# Patient Record
Sex: MALE | Race: WHITE | Employment: OTHER | URBAN - METROPOLITAN AREA
[De-identification: names, ages, dates, MRNs, and addresses within clinical notes are randomized per-mention and may not be internally consistent; named-entity substitution may affect disease eponyms.]

---

## 2017-12-04 ENCOUNTER — HOSPITAL ENCOUNTER (OUTPATIENT)
Dept: OTHER | Age: 82
Discharge: HOME | End: 2017-12-04
Attending: INTERNAL MEDICINE | Admitting: INTERNAL MEDICINE

## 2017-12-04 LAB
A/G RATIO: 1.3 (ref 1.1–2.2)
ALBUMIN SERPL-MCNC: 3.8 G/DL (ref 3.4–5)
ALP BLD-CCNC: 60 U/L (ref 40–129)
ALT SERPL-CCNC: 13 U/L (ref 10–40)
ANION GAP SERPL CALCULATED.3IONS-SCNC: 13 MMOL/L (ref 3–16)
AST SERPL-CCNC: 17 U/L (ref 15–37)
BASOPHILS ABSOLUTE: 0.1 K/UL (ref 0–0.2)
BASOPHILS RELATIVE PERCENT: 1 %
BILIRUB SERPL-MCNC: <0.2 MG/DL (ref 0–1)
BUN BLDV-MCNC: 15 MG/DL (ref 7–20)
CALCIUM SERPL-MCNC: 8.7 MG/DL (ref 8.3–10.6)
CHLORIDE BLD-SCNC: 105 MMOL/L (ref 99–110)
CHOLESTEROL, TOTAL: 149 MG/DL (ref 0–199)
CO2: 25 MMOL/L (ref 21–32)
CREAT SERPL-MCNC: 0.9 MG/DL (ref 0.8–1.3)
EOSINOPHILS ABSOLUTE: 0.5 K/UL (ref 0–0.6)
EOSINOPHILS RELATIVE PERCENT: 7.3 %
ESTIMATED AVERAGE GLUCOSE: 139.9 MG/DL
GFR AFRICAN AMERICAN: >60
GFR NON-AFRICAN AMERICAN: >60
GLOBULIN: 2.9 G/DL
GLUCOSE BLD-MCNC: 132 MG/DL (ref 70–99)
HBA1C MFR BLD: 6.5 %
HCT VFR BLD CALC: 36.8 % (ref 40.5–52.5)
HDLC SERPL-MCNC: 79 MG/DL (ref 40–60)
HEMOGLOBIN: 12.4 G/DL (ref 13.5–17.5)
LDL CHOLESTEROL CALCULATED: 58 MG/DL
LYMPHOCYTES ABSOLUTE: 1 K/UL (ref 1–5.1)
LYMPHOCYTES RELATIVE PERCENT: 16.6 %
MCH RBC QN AUTO: 32.4 PG (ref 26–34)
MCHC RBC AUTO-ENTMCNC: 33.6 G/DL (ref 31–36)
MCV RBC AUTO: 96.4 FL (ref 80–100)
MONOCYTES ABSOLUTE: 0.8 K/UL (ref 0–1.3)
MONOCYTES RELATIVE PERCENT: 13.7 %
NEUTROPHILS ABSOLUTE: 3.8 K/UL (ref 1.7–7.7)
NEUTROPHILS RELATIVE PERCENT: 61.4 %
PDW BLD-RTO: 13.3 % (ref 12.4–15.4)
PLATELET # BLD: 196 K/UL (ref 135–450)
PMV BLD AUTO: 8.6 FL (ref 5–10.5)
POTASSIUM SERPL-SCNC: 4.4 MMOL/L (ref 3.5–5.1)
RBC # BLD: 3.82 M/UL (ref 4.2–5.9)
SODIUM BLD-SCNC: 143 MMOL/L (ref 136–145)
TOTAL PROTEIN: 6.7 G/DL (ref 6.4–8.2)
TRIGL SERPL-MCNC: 58 MG/DL (ref 0–150)
TSH SERPL DL<=0.05 MIU/L-ACNC: 2.31 UIU/ML (ref 0.27–4.2)
VLDLC SERPL CALC-MCNC: 12 MG/DL
WBC # BLD: 6.2 K/UL (ref 4–11)

## 2018-12-03 ENCOUNTER — HOSPITAL ENCOUNTER (OUTPATIENT)
Age: 83
Discharge: HOME | End: 2018-12-03
Payer: MEDICARE

## 2018-12-03 LAB
A/G RATIO: 1.4 (ref 1.1–2.2)
ALBUMIN SERPL-MCNC: 4 G/DL (ref 3.4–5)
ALP BLD-CCNC: 71 U/L (ref 40–129)
ALT SERPL-CCNC: 14 U/L (ref 10–40)
ANION GAP SERPL CALCULATED.3IONS-SCNC: 11 MMOL/L (ref 3–16)
AST SERPL-CCNC: 17 U/L (ref 15–37)
BILIRUB SERPL-MCNC: <0.2 MG/DL (ref 0–1)
BUN BLDV-MCNC: 12 MG/DL (ref 7–20)
CALCIUM SERPL-MCNC: 9.1 MG/DL (ref 8.3–10.6)
CHLORIDE BLD-SCNC: 103 MMOL/L (ref 99–110)
CHOLESTEROL, TOTAL: 149 MG/DL (ref 0–199)
CO2: 26 MMOL/L (ref 21–32)
CREAT SERPL-MCNC: 1 MG/DL (ref 0.8–1.3)
GFR AFRICAN AMERICAN: >60
GFR NON-AFRICAN AMERICAN: >60
GLOBULIN: 2.9 G/DL
GLUCOSE BLD-MCNC: 117 MG/DL (ref 70–99)
HDLC SERPL-MCNC: 66 MG/DL (ref 40–60)
LDL CHOLESTEROL CALCULATED: 70 MG/DL
POTASSIUM SERPL-SCNC: 4.4 MMOL/L (ref 3.5–5.1)
SODIUM BLD-SCNC: 140 MMOL/L (ref 136–145)
TOTAL PROTEIN: 6.9 G/DL (ref 6.4–8.2)
TRIGL SERPL-MCNC: 64 MG/DL (ref 0–150)
TSH SERPL DL<=0.05 MIU/L-ACNC: 2.1 UIU/ML (ref 0.27–4.2)
VLDLC SERPL CALC-MCNC: 13 MG/DL

## 2018-12-03 PROCEDURE — 36415 COLL VENOUS BLD VENIPUNCTURE: CPT

## 2018-12-03 PROCEDURE — 80061 LIPID PANEL: CPT

## 2018-12-03 PROCEDURE — 84443 ASSAY THYROID STIM HORMONE: CPT

## 2018-12-03 PROCEDURE — 83036 HEMOGLOBIN GLYCOSYLATED A1C: CPT

## 2018-12-03 PROCEDURE — 80053 COMPREHEN METABOLIC PANEL: CPT

## 2018-12-04 LAB
ESTIMATED AVERAGE GLUCOSE: 148.5 MG/DL
HBA1C MFR BLD: 6.8 %

## 2019-04-16 ENCOUNTER — HOSPITAL ENCOUNTER (OUTPATIENT)
Age: 84
Discharge: HOME | End: 2019-04-16
Payer: MEDICARE

## 2019-04-16 LAB
A/G RATIO: 1.2 (ref 1.1–2.2)
ALBUMIN SERPL-MCNC: 3.8 G/DL (ref 3.4–5)
ALP BLD-CCNC: 75 U/L (ref 40–129)
ALT SERPL-CCNC: 13 U/L (ref 10–40)
ANION GAP SERPL CALCULATED.3IONS-SCNC: 10 MMOL/L (ref 3–16)
AST SERPL-CCNC: 20 U/L (ref 15–37)
BILIRUB SERPL-MCNC: <0.2 MG/DL (ref 0–1)
BUN BLDV-MCNC: 14 MG/DL (ref 7–20)
CALCIUM SERPL-MCNC: 9.1 MG/DL (ref 8.3–10.6)
CHLORIDE BLD-SCNC: 102 MMOL/L (ref 99–110)
CHOLESTEROL, TOTAL: 141 MG/DL (ref 0–199)
CO2: 27 MMOL/L (ref 21–32)
CREAT SERPL-MCNC: 1.2 MG/DL (ref 0.8–1.3)
GFR AFRICAN AMERICAN: >60
GFR NON-AFRICAN AMERICAN: 58
GLOBULIN: 3.2 G/DL
GLUCOSE BLD-MCNC: 125 MG/DL (ref 70–99)
HDLC SERPL-MCNC: 65 MG/DL (ref 40–60)
LDL CHOLESTEROL CALCULATED: 61 MG/DL
POTASSIUM SERPL-SCNC: 4.8 MMOL/L (ref 3.5–5.1)
SODIUM BLD-SCNC: 139 MMOL/L (ref 136–145)
TOTAL PROTEIN: 7 G/DL (ref 6.4–8.2)
TRIGL SERPL-MCNC: 76 MG/DL (ref 0–150)
TSH SERPL DL<=0.05 MIU/L-ACNC: 2.16 UIU/ML (ref 0.27–4.2)
VLDLC SERPL CALC-MCNC: 15 MG/DL

## 2019-04-16 PROCEDURE — 80061 LIPID PANEL: CPT

## 2019-04-16 PROCEDURE — 83036 HEMOGLOBIN GLYCOSYLATED A1C: CPT

## 2019-04-16 PROCEDURE — 36415 COLL VENOUS BLD VENIPUNCTURE: CPT

## 2019-04-16 PROCEDURE — 80053 COMPREHEN METABOLIC PANEL: CPT

## 2019-04-16 PROCEDURE — 84443 ASSAY THYROID STIM HORMONE: CPT

## 2019-04-17 LAB
ESTIMATED AVERAGE GLUCOSE: 142.7 MG/DL
HBA1C MFR BLD: 6.6 %

## 2019-06-20 ENCOUNTER — HOSPITAL ENCOUNTER (OUTPATIENT)
Age: 84
Discharge: HOME | End: 2019-06-20
Payer: MEDICARE

## 2019-06-20 ENCOUNTER — HOSPITAL ENCOUNTER (OUTPATIENT)
Dept: GENERAL RADIOLOGY | Age: 84
Discharge: HOME | End: 2019-06-20
Payer: MEDICARE

## 2019-06-20 DIAGNOSIS — M79.645 PAIN OF LEFT MIDDLE FINGER: ICD-10-CM

## 2019-06-20 PROCEDURE — 73140 X-RAY EXAM OF FINGER(S): CPT

## 2019-10-08 ENCOUNTER — HOSPITAL ENCOUNTER (OUTPATIENT)
Age: 84
Discharge: HOME | End: 2019-10-08
Payer: MEDICARE

## 2019-10-08 LAB
A/G RATIO: 1.7 (ref 1.1–2.2)
ALBUMIN SERPL-MCNC: 4.5 G/DL (ref 3.4–5)
ALP BLD-CCNC: 66 U/L (ref 40–129)
ALT SERPL-CCNC: 12 U/L (ref 10–40)
ANION GAP SERPL CALCULATED.3IONS-SCNC: 13 MMOL/L (ref 3–16)
AST SERPL-CCNC: 18 U/L (ref 15–37)
BASOPHILS ABSOLUTE: 0.1 K/UL (ref 0–0.2)
BASOPHILS RELATIVE PERCENT: 0.9 %
BILIRUB SERPL-MCNC: 0.3 MG/DL (ref 0–1)
BUN BLDV-MCNC: 16 MG/DL (ref 7–20)
CALCIUM SERPL-MCNC: 9.4 MG/DL (ref 8.3–10.6)
CHLORIDE BLD-SCNC: 103 MMOL/L (ref 99–110)
CHOLESTEROL, TOTAL: 141 MG/DL (ref 0–199)
CO2: 26 MMOL/L (ref 21–32)
CREAT SERPL-MCNC: 1.1 MG/DL (ref 0.8–1.3)
EOSINOPHILS ABSOLUTE: 0.3 K/UL (ref 0–0.6)
EOSINOPHILS RELATIVE PERCENT: 4.7 %
FOLATE: >20 NG/ML (ref 4.78–24.2)
GFR AFRICAN AMERICAN: >60
GFR NON-AFRICAN AMERICAN: >60
GLOBULIN: 2.6 G/DL
GLUCOSE BLD-MCNC: 106 MG/DL (ref 70–99)
HCT VFR BLD CALC: 37.4 % (ref 40.5–52.5)
HDLC SERPL-MCNC: 64 MG/DL (ref 40–60)
HEMOGLOBIN: 12.4 G/DL (ref 13.5–17.5)
LDL CHOLESTEROL CALCULATED: 58 MG/DL
LYMPHOCYTES ABSOLUTE: 1.4 K/UL (ref 1–5.1)
LYMPHOCYTES RELATIVE PERCENT: 21.1 %
MCH RBC QN AUTO: 32.3 PG (ref 26–34)
MCHC RBC AUTO-ENTMCNC: 33.1 G/DL (ref 31–36)
MCV RBC AUTO: 97.6 FL (ref 80–100)
MONOCYTES ABSOLUTE: 0.7 K/UL (ref 0–1.3)
MONOCYTES RELATIVE PERCENT: 10.8 %
NEUTROPHILS ABSOLUTE: 4.1 K/UL (ref 1.7–7.7)
NEUTROPHILS RELATIVE PERCENT: 62.5 %
PDW BLD-RTO: 13.4 % (ref 12.4–15.4)
PLATELET # BLD: 203 K/UL (ref 135–450)
PMV BLD AUTO: 9 FL (ref 5–10.5)
POTASSIUM SERPL-SCNC: 4.7 MMOL/L (ref 3.5–5.1)
RBC # BLD: 3.83 M/UL (ref 4.2–5.9)
SODIUM BLD-SCNC: 142 MMOL/L (ref 136–145)
TOTAL PROTEIN: 7.1 G/DL (ref 6.4–8.2)
TRIGL SERPL-MCNC: 96 MG/DL (ref 0–150)
TSH SERPL DL<=0.05 MIU/L-ACNC: 2.1 UIU/ML (ref 0.27–4.2)
VITAMIN B-12: 624 PG/ML (ref 211–911)
VITAMIN D 25-HYDROXY: 43.4 NG/ML
VLDLC SERPL CALC-MCNC: 19 MG/DL
WBC # BLD: 6.5 K/UL (ref 4–11)

## 2019-10-08 PROCEDURE — 83036 HEMOGLOBIN GLYCOSYLATED A1C: CPT

## 2019-10-08 PROCEDURE — 80061 LIPID PANEL: CPT

## 2019-10-08 PROCEDURE — 80053 COMPREHEN METABOLIC PANEL: CPT

## 2019-10-08 PROCEDURE — 82607 VITAMIN B-12: CPT

## 2019-10-08 PROCEDURE — 85025 COMPLETE CBC W/AUTO DIFF WBC: CPT

## 2019-10-08 PROCEDURE — 82306 VITAMIN D 25 HYDROXY: CPT

## 2019-10-08 PROCEDURE — 84443 ASSAY THYROID STIM HORMONE: CPT

## 2019-10-08 PROCEDURE — 82746 ASSAY OF FOLIC ACID SERUM: CPT

## 2019-10-08 PROCEDURE — 36415 COLL VENOUS BLD VENIPUNCTURE: CPT

## 2019-10-09 LAB
ESTIMATED AVERAGE GLUCOSE: 142.7 MG/DL
HBA1C MFR BLD: 6.6 %

## 2020-07-14 ENCOUNTER — HOSPITAL ENCOUNTER (OUTPATIENT)
Dept: GENERAL RADIOLOGY | Age: 85
Discharge: HOME | End: 2020-07-14
Payer: MEDICARE

## 2020-07-14 ENCOUNTER — HOSPITAL ENCOUNTER (OUTPATIENT)
Age: 85
Discharge: HOME | End: 2020-07-14
Payer: MEDICARE

## 2020-07-14 PROCEDURE — 72100 X-RAY EXAM L-S SPINE 2/3 VWS: CPT

## 2020-07-27 ENCOUNTER — HOSPITAL ENCOUNTER (OUTPATIENT)
Dept: VASCULAR LAB | Age: 85
Discharge: HOME | End: 2020-07-27
Payer: MEDICARE

## 2020-07-27 PROCEDURE — 93922 UPR/L XTREMITY ART 2 LEVELS: CPT

## 2020-07-31 ENCOUNTER — HOSPITAL ENCOUNTER (OUTPATIENT)
Age: 85
Discharge: HOME | End: 2020-07-31
Payer: MEDICARE

## 2020-07-31 LAB — C DIFF TOXIN/ANTIGEN: NORMAL

## 2020-07-31 PROCEDURE — 87336 ENTAMOEB HIST DISPR AG IA: CPT

## 2020-07-31 PROCEDURE — 87324 CLOSTRIDIUM AG IA: CPT

## 2020-07-31 PROCEDURE — 87328 CRYPTOSPORIDIUM AG IA: CPT

## 2020-07-31 PROCEDURE — 87449 NOS EACH ORGANISM AG IA: CPT

## 2020-07-31 PROCEDURE — 83630 LACTOFERRIN FECAL (QUAL): CPT

## 2020-07-31 PROCEDURE — 87505 NFCT AGENT DETECTION GI: CPT

## 2020-08-01 LAB
CRYPTOSPORIDIUM ANTIGEN STOOL: NORMAL
E HISTOLYTICA ANTIGEN STOOL: NORMAL
GIARDIA ANTIGEN STOOL: NORMAL
WHITE BLOOD CELLS (WBC), STOOL: ABNORMAL

## 2020-08-02 LAB — GI BACTERIAL PATHOGENS BY PCR: NORMAL

## 2020-08-05 ENCOUNTER — OFFICE VISIT (OUTPATIENT)
Dept: VASCULAR SURGERY | Age: 85
End: 2020-08-05
Payer: MEDICARE

## 2020-08-05 VITALS
DIASTOLIC BLOOD PRESSURE: 62 MMHG | WEIGHT: 135.8 LBS | SYSTOLIC BLOOD PRESSURE: 150 MMHG | TEMPERATURE: 98.6 F | BODY MASS INDEX: 22.63 KG/M2 | HEIGHT: 65 IN

## 2020-08-05 PROCEDURE — 99205 OFFICE O/P NEW HI 60 MIN: CPT | Performed by: SURGERY

## 2020-08-05 RX ORDER — LEVOTHYROXINE SODIUM 0.05 MG/1
TABLET ORAL
COMMUNITY
Start: 2020-05-04 | End: 2021-05-19

## 2020-08-05 RX ORDER — LATANOPROST 50 UG/ML
1 SOLUTION/ DROPS OPHTHALMIC DAILY
COMMUNITY
Start: 2020-05-21

## 2020-08-05 RX ORDER — BUDESONIDE AND FORMOTEROL FUMARATE DIHYDRATE 160; 4.5 UG/1; UG/1
AEROSOL RESPIRATORY (INHALATION)
COMMUNITY
Start: 2020-06-17 | End: 2021-05-19

## 2020-08-05 RX ORDER — MULTIVITAMIN
1 TABLET ORAL DAILY
COMMUNITY

## 2020-08-05 NOTE — PROGRESS NOTES
Subjective:      Patient ID: Dino Diaz is a 82 y.o. male. HPI Referral from Curt Crawford MD for evaluation of c/o L calf pain with walking occurring at 1-1 1/2 blocks without extension into the thigh or buttock. Relieved with rest and recurs with resumption of walking. Began approximately 6 months ago and seems to have worsened with shorter distances. No rest pain, foot ulcers or gangrene. Has not been involved with a walking program or added any medication. No previous vascular interventions. History reviewed. No pertinent past medical history. Past Surgical History:   Procedure Laterality Date    CARPAL TUNNEL RELEASE Bilateral     CYSTOSCOPY      LUMBAR SPINE SURGERY       Allergies   Allergen Reactions    Naproxen Rash     Current Outpatient Medications   Medication Sig Dispense Refill    SYMBICORT 160-4.5 MCG/ACT AERO       levothyroxine (SYNTHROID) 50 MCG tablet       latanoprost (XALATAN) 0.005 % ophthalmic solution       Multiple Vitamin (MULTIVITAMIN) tablet Take 1 tablet by mouth daily      triamcinolone (KENALOG) 0.1 % ointment        No current facility-administered medications for this visit. Social History     Socioeconomic History    Marital status:       Spouse name: Not on file    Number of children: Not on file    Years of education: Not on file    Highest education level: Not on file   Occupational History    Not on file   Social Needs    Financial resource strain: Not on file    Food insecurity     Worry: Not on file     Inability: Not on file    Transportation needs     Medical: Not on file     Non-medical: Not on file   Tobacco Use    Smoking status: Former Smoker    Smokeless tobacco: Former User   Substance and Sexual Activity    Alcohol use: Not on file    Drug use: Not on file    Sexual activity: Not on file   Lifestyle    Physical activity     Days per week: Not on file     Minutes per session: Not on file    Stress: Not on file Relationships    Social connections     Talks on phone: Not on file     Gets together: Not on file     Attends Restorationism service: Not on file     Active member of club or organization: Not on file     Attends meetings of clubs or organizations: Not on file     Relationship status: Not on file    Intimate partner violence     Fear of current or ex partner: Not on file     Emotionally abused: Not on file     Physically abused: Not on file     Forced sexual activity: Not on file   Other Topics Concern    Not on file   Social History Narrative    Not on file     Family History   Family history unknown: Yes         Review of Systems   All other systems reviewed and are negative. 15 pt ROS confirmed personally by this MD.    Objective:   Physical Exam  Vitals signs and nursing note reviewed. Constitutional:       Appearance: Normal appearance. Comments: Looks younger than stated age   HENT:      Head: Normocephalic and atraumatic. Right Ear: External ear normal.      Left Ear: External ear normal.      Nose: Nose normal.      Mouth/Throat:      Mouth: Mucous membranes are moist.      Pharynx: Oropharynx is clear. Eyes:      Extraocular Movements: Extraocular movements intact. Conjunctiva/sclera: Conjunctivae normal.   Neck:      Musculoskeletal: Neck supple. Cardiovascular:      Rate and Rhythm: Normal rate and regular rhythm. Heart sounds: Normal heart sounds. Pulmonary:      Effort: Pulmonary effort is normal.      Breath sounds: Normal breath sounds. Abdominal:      General: Abdomen is flat. Bowel sounds are normal.      Palpations: Abdomen is soft. There is mass (4-5 cm pulsatile mass). Musculoskeletal:      Right lower leg: No edema. Left lower leg: No edema. Skin:     General: Skin is warm and dry. Capillary Refill: Capillary refill takes less than 2 seconds. Neurological:      General: No focal deficit present.       Mental Status: He is alert and oriented to person, place, and time. Cranial Nerves: No cranial nerve deficit. Sensory: No sensory deficit. Motor: No weakness. Coordination: Coordination normal.      Gait: Gait normal.   Psychiatric:         Mood and Affect: Mood normal.         Behavior: Behavior normal.         Thought Content: Thought content normal.         Judgment: Judgment normal.       Pulses:   R bruit  L bruit   2   carotid 2    2   brachial 2    2   radial 2    2   femoral 2 +   2   popliteal 0    0   posterior tibial 0    0   dorsalis pedis 0    na   bypass graft na      ABIs with PPGs 7/27/20202  Impressions    Right Impression    Right DENISE: 0.86. This is consistent with mild arterial insufficiency at rest.    Right first toe/brachial index 0.42: This is moderately abnormal .    Continuous wave Doppler of the right PTA, DPA is abnormal biphasic low    resistive . Right pulse volume recordings demonstrates mild tibial runoff disease . Digit photoplethysmography (PPG) on the right 1st through 5th digits    demonstrates mildly diminished waveforms . Left Impression    Left DENISE: 0.45. This is consistent with severe arterial insufficiency at rest.    Left first toe/brachial index 0.23: This is severely abnormal and not    favorable for wound healing . Continuous wave Doppler of the left PTA, DPA is abnormal monophasic . Left pulse volume recordings demonstrates moderate tibial runoff disease . Digit photoplethysmography (PPG) on the left 1st through 5th digits    demonstrates severely diminished waveforms .         Conclusions          Summary          -Right DENISE: 0.86. This is consistent with mild arterial insufficiency at     rest.     -Right first toe/brachial index 0.42: This is moderately abnormal.          -Left DENISE: 0.45. This is consistent with severe arterial insufficiency at     rest.     -Left first toe/brachial index 0.23:  This is severely abnormal and not     favorable for wound healing .       Recommendations          -Recommend referral to a vascular specialist.          Signature          ------------------------------------------------------------------     Electronically signed by Olivier Ramon MD, [facility] (Interpreting     [id number]) on 07/28/2020 at 01:39 PM     ------------------------------------------------------------------         Patient Status:Routine. [address] [place] [address] - Vascular Lab.         Velocities are measured in cm/s ; Diameters are measured in mm         Pressures    +---------++--------+-----+----+--------+-----+    !         ! ! Right   !     !Left!        !     !    +---------++--------+-----+----+--------+-----+    ! Location ! !Pressure! Ratio!    !Pressure! Ratio! +---------++--------+-----+----+--------+-----+    ! DP       !!80      !0.7  !    !49      !0.42 ! +---------++--------+-----+----+--------+-----+    ! Ankle PT !!102     !0.86 !    !53      !0.45 !    +---------++--------+-----+----+--------+-----+    ! Great Toe!!50      !0.42 !    !27      !0.23 !    +---------++--------+-----+----+--------+-----+           - Brachial Pressure:Right: 118. Left:114.           - DENISE:Right: 0.86. Left: 0.45.         Right Plethysmographic Results           - Right Brachial Pressure:118.           - Right DENISE:0.86.         Left Plethysmographic Results           - Left Brachial Pressure:114.           - Left DENISE:0.45.         Plethysmographic Digit Evaluation    +---------++--------+-----+-------------++--------+-----+-------------+    !         ! ! Right   !     ! Left         !!        !     !             !    +---------++--------+-----+-------------++--------+-----+-------------+    ! Location ! !Pressure! Ratio! PPG Wave Form! !Pressure! Ratio! PPG Wave Form!    +---------++--------+-----+-------------++--------+-----+-------------+    ! Great Toe!!50      !0.42 !             !!32      !0.23 !             ! 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 No limb threatening ischemia  2) Asymptomatic R tibial occlusive disease  3) H/O tobacco abuse  4) COPD  5) Prominent aortic pulsation - possible AAA (suggested on plain back Xrays)      Plan:      Outlined the various presentations of LE PAD and the risk of limb loss associated with both. His current presentation with claudication is of low risk for progression to limb loss or progression to the need for intervention for limb salvage. At this point I have recommended that he proceed with a noninterventional approach for since he is doing well without limitations of his normal activities. This would involve initiation of a walking program walking 5 to 6 days a week for up to an hour pushing his distance of claudication for improvement. The option of adding Pletal exists as well should he show some benefit from walking however this would not be prescribed as a stand-alone therapy. He is agreeable to this conservative approach and understands that walking will not damage his leg.   He is to return to see us in 6 weeks for reevaluation and possible addition of Pletal.  He will also undergo an aortic duplex scan to evaluate for abdominal aortic aneurysm suggested on physical exam.

## 2020-08-11 ENCOUNTER — HOSPITAL ENCOUNTER (OUTPATIENT)
Dept: VASCULAR LAB | Age: 85
Discharge: HOME | End: 2020-08-11
Payer: MEDICARE

## 2020-08-11 PROCEDURE — 93978 VASCULAR STUDY: CPT

## 2020-09-02 ENCOUNTER — TELEPHONE (OUTPATIENT)
Dept: VASCULAR SURGERY | Age: 85
End: 2020-09-02

## 2020-09-02 NOTE — TELEPHONE ENCOUNTER
I LMOM for patient that per Dr. Espinoza the AAA is still below 4cm. RTO 1 year with scan & OV to also discuss claudication.

## 2020-09-14 ENCOUNTER — HOSPITAL ENCOUNTER (OUTPATIENT)
Age: 85
Discharge: HOME | End: 2020-09-14
Payer: MEDICARE

## 2020-09-14 LAB
A/G RATIO: 1.4 (ref 1.1–2.2)
ALBUMIN SERPL-MCNC: 3.9 G/DL (ref 3.4–5)
ALP BLD-CCNC: 49 U/L (ref 40–129)
ALT SERPL-CCNC: 11 U/L (ref 10–40)
ANION GAP SERPL CALCULATED.3IONS-SCNC: 10 MMOL/L (ref 3–16)
AST SERPL-CCNC: 18 U/L (ref 15–37)
BASOPHILS ABSOLUTE: 0.1 K/UL (ref 0–0.2)
BASOPHILS RELATIVE PERCENT: 1 %
BILIRUB SERPL-MCNC: <0.2 MG/DL (ref 0–1)
BUN BLDV-MCNC: 19 MG/DL (ref 7–20)
CALCIUM SERPL-MCNC: 9.1 MG/DL (ref 8.3–10.6)
CHLORIDE BLD-SCNC: 102 MMOL/L (ref 99–110)
CHOLESTEROL, TOTAL: 197 MG/DL (ref 0–199)
CO2: 25 MMOL/L (ref 21–32)
CREAT SERPL-MCNC: 1.2 MG/DL (ref 0.8–1.3)
EOSINOPHILS ABSOLUTE: 0.6 K/UL (ref 0–0.6)
EOSINOPHILS RELATIVE PERCENT: 6.9 %
FOLATE: 18.84 NG/ML (ref 4.78–24.2)
GFR AFRICAN AMERICAN: >60
GFR NON-AFRICAN AMERICAN: 58
GLOBULIN: 2.8 G/DL
GLUCOSE BLD-MCNC: 126 MG/DL (ref 70–99)
HCT VFR BLD CALC: 35.7 % (ref 40.5–52.5)
HDLC SERPL-MCNC: 61 MG/DL (ref 40–60)
HEMOGLOBIN: 11.9 G/DL (ref 13.5–17.5)
LDL CHOLESTEROL CALCULATED: 116 MG/DL
LYMPHOCYTES ABSOLUTE: 1.4 K/UL (ref 1–5.1)
LYMPHOCYTES RELATIVE PERCENT: 17.5 %
MCH RBC QN AUTO: 31.5 PG (ref 26–34)
MCHC RBC AUTO-ENTMCNC: 33.3 G/DL (ref 31–36)
MCV RBC AUTO: 94.6 FL (ref 80–100)
MONOCYTES ABSOLUTE: 0.9 K/UL (ref 0–1.3)
MONOCYTES RELATIVE PERCENT: 11.4 %
NEUTROPHILS ABSOLUTE: 5.2 K/UL (ref 1.7–7.7)
NEUTROPHILS RELATIVE PERCENT: 63.2 %
PDW BLD-RTO: 14.5 % (ref 12.4–15.4)
PLATELET # BLD: 226 K/UL (ref 135–450)
PMV BLD AUTO: 8.1 FL (ref 5–10.5)
POTASSIUM SERPL-SCNC: 5.1 MMOL/L (ref 3.5–5.1)
RBC # BLD: 3.77 M/UL (ref 4.2–5.9)
SODIUM BLD-SCNC: 137 MMOL/L (ref 136–145)
TOTAL PROTEIN: 6.7 G/DL (ref 6.4–8.2)
TRIGL SERPL-MCNC: 98 MG/DL (ref 0–150)
TSH SERPL DL<=0.05 MIU/L-ACNC: 1.79 UIU/ML (ref 0.27–4.2)
VITAMIN B-12: 1181 PG/ML (ref 211–911)
VITAMIN D 25-HYDROXY: 36.5 NG/ML
VLDLC SERPL CALC-MCNC: 20 MG/DL
WBC # BLD: 8.2 K/UL (ref 4–11)

## 2020-09-14 PROCEDURE — 36415 COLL VENOUS BLD VENIPUNCTURE: CPT

## 2020-09-14 PROCEDURE — 80061 LIPID PANEL: CPT

## 2020-09-14 PROCEDURE — 85025 COMPLETE CBC W/AUTO DIFF WBC: CPT

## 2020-09-14 PROCEDURE — 82306 VITAMIN D 25 HYDROXY: CPT

## 2020-09-14 PROCEDURE — 80053 COMPREHEN METABOLIC PANEL: CPT

## 2020-09-14 PROCEDURE — 82607 VITAMIN B-12: CPT

## 2020-09-14 PROCEDURE — 83036 HEMOGLOBIN GLYCOSYLATED A1C: CPT

## 2020-09-14 PROCEDURE — 82746 ASSAY OF FOLIC ACID SERUM: CPT

## 2020-09-14 PROCEDURE — 84443 ASSAY THYROID STIM HORMONE: CPT

## 2020-09-15 LAB
ESTIMATED AVERAGE GLUCOSE: 154.2 MG/DL
HBA1C MFR BLD: 7 %

## 2020-09-16 ENCOUNTER — OFFICE VISIT (OUTPATIENT)
Dept: VASCULAR SURGERY | Age: 85
End: 2020-09-16
Payer: MEDICARE

## 2020-09-16 VITALS — WEIGHT: 130 LBS | TEMPERATURE: 98.8 F | HEIGHT: 65 IN | BODY MASS INDEX: 21.66 KG/M2

## 2020-09-16 PROCEDURE — 99213 OFFICE O/P EST LOW 20 MIN: CPT | Performed by: SURGERY

## 2020-09-16 NOTE — PROGRESS NOTES
Subjective:      Patient ID: Milton Dodson is a 82 y.o. male. HPI Original referral from Shonda Vale MD for evaluation of c/o L calf pain with walking occurring at 1-1 1/2 blocks without extension into the thigh or buttock. Relieved with rest and recurs with resumption of walking. No change in symptoms but has noted some improved distance with the walking program recommended. No rest pain, foot ulcers or gangrene. U/S for AAA EVALUATION. History reviewed. No pertinent past medical history. Past Surgical History:   Procedure Laterality Date    CARPAL TUNNEL RELEASE Bilateral     CYSTOSCOPY      LUMBAR SPINE SURGERY       Allergies   Allergen Reactions    Naproxen Rash     Current Outpatient Medications   Medication Sig Dispense Refill    SYMBICORT 160-4.5 MCG/ACT AERO       levothyroxine (SYNTHROID) 50 MCG tablet       latanoprost (XALATAN) 0.005 % ophthalmic solution       Multiple Vitamin (MULTIVITAMIN) tablet Take 1 tablet by mouth daily      triamcinolone (KENALOG) 0.1 % ointment        No current facility-administered medications for this visit. Social History     Socioeconomic History    Marital status:       Spouse name: Not on file    Number of children: Not on file    Years of education: Not on file    Highest education level: Not on file   Occupational History    Not on file   Social Needs    Financial resource strain: Not on file    Food insecurity     Worry: Not on file     Inability: Not on file    Transportation needs     Medical: Not on file     Non-medical: Not on file   Tobacco Use    Smoking status: Former Smoker    Smokeless tobacco: Former User   Substance and Sexual Activity    Alcohol use: Not on file    Drug use: Not on file    Sexual activity: Not on file   Lifestyle    Physical activity     Days per week: Not on file     Minutes per session: Not on file    Stress: Not on file   Relationships    Social connections     Talks on phone: Not on file Sensory: No sensory deficit. Motor: No weakness. Coordination: Coordination normal.      Gait: Gait normal.   Psychiatric:         Mood and Affect: Mood normal.         Behavior: Behavior normal.         Thought Content: Thought content normal.         Judgment: Judgment normal.       Pulses:   R bruit  L bruit   2   carotid 2    2   brachial 2    2   radial 2    2   femoral 2 +   2   popliteal 0    0   posterior tibial 0    0   dorsalis pedis 0    na   bypass graft na      ABIs with PPGs 7/27/20202  Impressions    Right Impression    Right DENISE: 0.86. This is consistent with mild arterial insufficiency at rest.    Right first toe/brachial index 0.42: This is moderately abnormal .    Continuous wave Doppler of the right PTA, DPA is abnormal biphasic low    resistive . Right pulse volume recordings demonstrates mild tibial runoff disease . Digit photoplethysmography (PPG) on the right 1st through 5th digits    demonstrates mildly diminished waveforms . Left Impression    Left DENISE: 0.45. This is consistent with severe arterial insufficiency at rest.    Left first toe/brachial index 0.23: This is severely abnormal and not    favorable for wound healing . Continuous wave Doppler of the left PTA, DPA is abnormal monophasic . Left pulse volume recordings demonstrates moderate tibial runoff disease . Digit photoplethysmography (PPG) on the left 1st through 5th digits    demonstrates severely diminished waveforms .         Conclusions          Summary          -Right EDNISE: 0.86. This is consistent with mild arterial insufficiency at     rest.     -Right first toe/brachial index 0.42: This is moderately abnormal.          -Left DENISE: 0.45. This is consistent with severe arterial insufficiency at     rest.     -Left first toe/brachial index 0.23:  This is severely abnormal and not     favorable for wound healing .          Recommendations          -Recommend referral to a vascular specialist.       Signature          ------------------------------------------------------------------     Electronically signed by Marci Vásquez MD, [facility] [place] (Interpreting    [phone]) on 07/28/2020 at 01:39 PM     ------------------------------------------------------------------         Patient Status:Routine. [address] [place] [address] Vascular Lab.         Velocities are measured in cm/s ; Diameters are measured in mm         Pressures    +---------++--------+-----+----+--------+-----+    !         ! ! Right   !     !Left!        !     !    +---------++--------+-----+----+--------+-----+    ! Location ! !Pressure! Ratio!    !Pressure! Ratio! +---------++--------+-----+----+--------+-----+    ! DP       !!80      !0.7  !    !49      !0.42 ! +---------++--------+-----+----+--------+-----+    ! Ankle PT !!102     !0.86 !    !53      !0.45 !    +---------++--------+-----+----+--------+-----+    ! Great Toe!!50      !0.42 !    !27      !0.23 !    +---------++--------+-----+----+--------+-----+           - Brachial Pressure:Right: 118. Left:114.           - DENISE:Right: 0.86. Left: 0.45.         Right Plethysmographic Results           - Right Brachial Pressure:118.           - Right DENISE:0.86.         Left Plethysmographic Results           - Left Brachial Pressure:114.           - Left DENISE:0.45.         Plethysmographic Digit Evaluation    +---------++--------+-----+-------------++--------+-----+-------------+    !         ! ! Right   !     ! Left         !!        !     !             !    +---------++--------+-----+-------------++--------+-----+-------------+    ! Location ! !Pressure! Ratio! PPG Wave Form! !Pressure! Ratio! PPG Wave Form!    +---------++--------+-----+-------------++--------+-----+-------------+    ! Great Toe!!50      !0.42 !             !!32      !0.23 !             !    +---------++--------+-----+-------------++--------+-----+-------------+           AAA scan 8/11/2020  Impressions    Right Impression    The right common and external iliac arteries velocity patterns are normal with    no evidence of dilatation. Left Impression    The left common and external iliac artery velocity pattern are normal with no    evidence of dilatation. Unilateral ImpressionThere is aneurysmal dilatation of the abdominal aorta    measuring 2.8 cm X 3.4 cm. The abdominal aorta artery velocity pattern is within normal limits.         Conclusions          Summary          There is aneurysmal dilatation of the abdominal aorta measuring 2.8 cm X     3.4 cm.               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 No limb threatening ischemia. Improved slightly with dedicated walking program  2) Asymptomatic R tibial occlusive disease  3) H/O tobacco abuse  4) COPD  5) Small asymptomatic AAA (3.4 cm)      Plan:      Continue current walking program.  Since he has shown dedication to this I have suggested that he begin Pletal 100 mg p.o. twice daily. In his case this may significantly benefit his walking distance. No intention of intervention unless he worsens or develops limb threatening ischemia. We will follow the diagnosed abdominal aortic aneurysm with yearly duplex scans until it reaches 4 cm at which surveillance will be converted to every 6 months with intended repair in the 5-5 and half centimeter range. Patient understands and is comfortable with this recommendation. Return to office in 2 months to follow-up on response to exercise and medication.

## 2020-09-17 RX ORDER — CILOSTAZOL 100 MG/1
100 TABLET ORAL 2 TIMES DAILY
Qty: 60 TABLET | Refills: 3 | Status: SHIPPED | OUTPATIENT
Start: 2020-09-17 | End: 2021-04-08 | Stop reason: SDUPTHER

## 2020-11-18 ENCOUNTER — OFFICE VISIT (OUTPATIENT)
Dept: VASCULAR SURGERY | Age: 85
End: 2020-11-18
Payer: MEDICARE

## 2020-11-18 VITALS — BODY MASS INDEX: 23.63 KG/M2 | TEMPERATURE: 97.1 F | WEIGHT: 142 LBS

## 2020-11-18 PROCEDURE — 99212 OFFICE O/P EST SF 10 MIN: CPT | Performed by: SURGERY

## 2020-11-18 NOTE — PROGRESS NOTES
Seen back for L calf claudication. On last visit began Pletal 100 mg BID and encouraged continued exercise. Pt notes elimination of leg cramp and now only R calf fatigue at longer distances. No rest pain or foot ulcers. Walking now is primarily restricted by COPD and SOB that forces him to pause. Able to perform daily activities without difficulty. EXAM:  B feet well perfused without ulceration    Pulses unchanged from previously    A/P: Multilevel L infrainguinal arterial disease with L calf claudication - well controlled with exercise and Pletal   Will continue current management approach and deviate only if worsened symptoms, rest pain or foot ulceration. Pt agreeable to this approach. F/U 6 months or earlier if new issues. Will need AAA scan on a yearly basis as well.

## 2021-01-18 ENCOUNTER — TELEPHONE (OUTPATIENT)
Dept: VASCULAR SURGERY | Age: 86
End: 2021-01-18

## 2021-03-22 ENCOUNTER — HOSPITAL ENCOUNTER (OUTPATIENT)
Age: 86
Discharge: HOME | End: 2021-03-22
Payer: MEDICARE

## 2021-03-22 LAB
A/G RATIO: 1.1 (ref 1.1–2.2)
ALBUMIN SERPL-MCNC: 3.5 G/DL (ref 3.4–5)
ALP BLD-CCNC: 69 U/L (ref 40–129)
ALT SERPL-CCNC: 9 U/L (ref 10–40)
ANION GAP SERPL CALCULATED.3IONS-SCNC: 8 MMOL/L (ref 3–16)
AST SERPL-CCNC: 17 U/L (ref 15–37)
BASOPHILS ABSOLUTE: 0.1 K/UL (ref 0–0.2)
BASOPHILS RELATIVE PERCENT: 0.9 %
BILIRUB SERPL-MCNC: <0.2 MG/DL (ref 0–1)
BUN BLDV-MCNC: 18 MG/DL (ref 7–20)
CALCIUM SERPL-MCNC: 8.5 MG/DL (ref 8.3–10.6)
CHLORIDE BLD-SCNC: 99 MMOL/L (ref 99–110)
CHOLESTEROL, TOTAL: 183 MG/DL (ref 0–199)
CO2: 25 MMOL/L (ref 21–32)
CREAT SERPL-MCNC: 1.2 MG/DL (ref 0.8–1.3)
EOSINOPHILS ABSOLUTE: 0.4 K/UL (ref 0–0.6)
EOSINOPHILS RELATIVE PERCENT: 5.2 %
GFR AFRICAN AMERICAN: >60
GFR NON-AFRICAN AMERICAN: 58
GLOBULIN: 3.3 G/DL
GLUCOSE BLD-MCNC: 121 MG/DL (ref 70–99)
HCT VFR BLD CALC: 35 % (ref 40.5–52.5)
HDLC SERPL-MCNC: 54 MG/DL (ref 40–60)
HEMOGLOBIN: 12 G/DL (ref 13.5–17.5)
LDL CHOLESTEROL CALCULATED: 114 MG/DL
LYMPHOCYTES ABSOLUTE: 1.2 K/UL (ref 1–5.1)
LYMPHOCYTES RELATIVE PERCENT: 16.6 %
MCH RBC QN AUTO: 32.9 PG (ref 26–34)
MCHC RBC AUTO-ENTMCNC: 34.3 G/DL (ref 31–36)
MCV RBC AUTO: 95.9 FL (ref 80–100)
MONOCYTES ABSOLUTE: 0.9 K/UL (ref 0–1.3)
MONOCYTES RELATIVE PERCENT: 12.8 %
NEUTROPHILS ABSOLUTE: 4.7 K/UL (ref 1.7–7.7)
NEUTROPHILS RELATIVE PERCENT: 64.5 %
PDW BLD-RTO: 13.5 % (ref 12.4–15.4)
PLATELET # BLD: 213 K/UL (ref 135–450)
PMV BLD AUTO: 8.2 FL (ref 5–10.5)
POTASSIUM SERPL-SCNC: 5 MMOL/L (ref 3.5–5.1)
RBC # BLD: 3.65 M/UL (ref 4.2–5.9)
SODIUM BLD-SCNC: 132 MMOL/L (ref 136–145)
TOTAL PROTEIN: 6.8 G/DL (ref 6.4–8.2)
TRIGL SERPL-MCNC: 75 MG/DL (ref 0–150)
TSH SERPL DL<=0.05 MIU/L-ACNC: 2.43 UIU/ML (ref 0.27–4.2)
VLDLC SERPL CALC-MCNC: 15 MG/DL
WBC # BLD: 7.3 K/UL (ref 4–11)

## 2021-03-22 PROCEDURE — 85025 COMPLETE CBC W/AUTO DIFF WBC: CPT

## 2021-03-22 PROCEDURE — 84443 ASSAY THYROID STIM HORMONE: CPT

## 2021-03-22 PROCEDURE — 83036 HEMOGLOBIN GLYCOSYLATED A1C: CPT

## 2021-03-22 PROCEDURE — 36415 COLL VENOUS BLD VENIPUNCTURE: CPT

## 2021-03-22 PROCEDURE — 80061 LIPID PANEL: CPT

## 2021-03-22 PROCEDURE — 80053 COMPREHEN METABOLIC PANEL: CPT

## 2021-03-23 LAB
ESTIMATED AVERAGE GLUCOSE: 139.9 MG/DL
HBA1C MFR BLD: 6.5 %

## 2021-04-08 RX ORDER — CILOSTAZOL 100 MG/1
100 TABLET ORAL 2 TIMES DAILY
Qty: 60 TABLET | Refills: 3 | Status: SHIPPED | OUTPATIENT
Start: 2021-04-08 | End: 2021-09-28 | Stop reason: ALTCHOICE

## 2021-05-19 ENCOUNTER — OFFICE VISIT (OUTPATIENT)
Dept: VASCULAR SURGERY | Age: 86
End: 2021-05-19
Payer: MEDICARE

## 2021-05-19 VITALS
SYSTOLIC BLOOD PRESSURE: 130 MMHG | DIASTOLIC BLOOD PRESSURE: 60 MMHG | HEIGHT: 64 IN | WEIGHT: 137 LBS | BODY MASS INDEX: 23.39 KG/M2

## 2021-05-19 DIAGNOSIS — I71.40 ABDOMINAL AORTIC ANEURYSM (AAA) WITHOUT RUPTURE: Primary | ICD-10-CM

## 2021-05-19 DIAGNOSIS — I73.9 CLAUDICATION IN PERIPHERAL VASCULAR DISEASE (HCC): ICD-10-CM

## 2021-05-19 PROCEDURE — 99214 OFFICE O/P EST MOD 30 MIN: CPT | Performed by: SURGERY

## 2021-05-19 RX ORDER — LEVOTHYROXINE SODIUM 0.07 MG/1
75 TABLET ORAL DAILY
COMMUNITY

## 2021-05-19 RX ORDER — SITAGLIPTIN 50 MG/1
50 TABLET, FILM COATED ORAL DAILY
COMMUNITY
Start: 2021-03-29

## 2021-05-19 RX ORDER — BRIMONIDINE TARTRATE/TIMOLOL 0.2%-0.5%
1 DROPS OPHTHALMIC (EYE) EVERY 12 HOURS
COMMUNITY
Start: 2021-05-01

## 2021-05-19 NOTE — PROGRESS NOTES
Subjective:      Patient ID: Jamal Mcmahon is a 82 y.o. male. HPI Original referral from Jose Zamudio MD for evaluation of c/o L calf pain with walking occurring at 1-1 1/2 blocks without extension into the thigh or buttock. Relieved with rest and recurs with resumption of walking. Has been Pletal and noted benefit but now has developed issues with diarrhea that comes on after 1 -2 weeks of use and resolves with cessation of medication (100 mg q day). Continues to walk which is limited more by WHITAKER rather than leg pain which occurs at 1-2 blocks. No foot pain or ulceration/gangrene. No limitations on activities of daily living. History reviewed. No pertinent past medical history. Past Surgical History:   Procedure Laterality Date    CARPAL TUNNEL RELEASE Bilateral     CYSTOSCOPY      LUMBAR SPINE SURGERY       Allergies   Allergen Reactions    Naproxen Rash     Current Outpatient Medications   Medication Sig Dispense Refill    levothyroxine (SYNTHROID) 75 MCG tablet Take 75 mcg by mouth Daily      cilostazol (PLETAL) 100 MG tablet Take 1 tablet by mouth 2 times daily 60 tablet 3    latanoprost (XALATAN) 0.005 % ophthalmic solution       Multiple Vitamin (MULTIVITAMIN) tablet Take 1 tablet by mouth daily      triamcinolone (KENALOG) 0.1 % ointment       JANUVIA 50 MG tablet       COMBIGAN 0.2-0.5 % ophthalmic solution        No current facility-administered medications for this visit. Social History     Socioeconomic History    Marital status:       Spouse name: Not on file    Number of children: Not on file    Years of education: Not on file    Highest education level: Not on file   Occupational History    Not on file   Tobacco Use    Smoking status: Former Smoker    Smokeless tobacco: Former User   Substance and Sexual Activity    Alcohol use: Not on file    Drug use: Not on file    Sexual activity: Not on file   Other Topics Concern    Not on file   Social History Narrative    Not on file     Social Determinants of Health     Financial Resource Strain:     Difficulty of Paying Living Expenses:    Food Insecurity:     Worried About Running Out of Food in the Last Year:     [address] in the Last Year:    Transportation Needs:     Lack of Transportation (Medical):  Lack of Transportation (Non-Medical):    Physical Activity:     Days of Exercise per Week:     Minutes of Exercise per Session:    Stress:     Feeling of Stress :    Social Connections:     Frequency of Communication with Friends and Family:     Frequency of Social Gatherings with Friends and Family:     Attends Orthodox Services:     Active Member of Clubs or Organizations:     Attends Club or Organization Meetings:     Marital Status:    Intimate Partner Violence:     Fear of Current or Ex-Partner:     Emotionally Abused:     Physically Abused:     Sexually Abused:      Family History   Family history unknown: Yes         Review of Systems   All other systems reviewed and are negative. 15 pt ROS confirmed personally by this MD.    Objective:   Physical Exam  Vitals and nursing note reviewed. Constitutional:       Appearance: Normal appearance. Comments: Looks younger than stated age   HENT:      Head: Normocephalic and atraumatic. Right Ear: External ear normal.      Left Ear: External ear normal.      Nose: Nose normal.      Mouth/Throat:      Mouth: Mucous membranes are moist.      Pharynx: Oropharynx is clear. Eyes:      Extraocular Movements: Extraocular movements intact. Conjunctiva/sclera: Conjunctivae normal.   Cardiovascular:      Rate and Rhythm: Normal rate and regular rhythm. Heart sounds: Normal heart sounds. Pulmonary:      Effort: Pulmonary effort is normal.      Breath sounds: Normal breath sounds. Abdominal:      General: Abdomen is flat. Bowel sounds are normal.      Palpations: Abdomen is soft. There is mass (4-5 cm pulsatile mass). Musculoskeletal:      Cervical back: Neck supple. Right lower leg: No edema. Left lower leg: No edema. Skin:     General: Skin is warm and dry. Capillary Refill: Capillary refill takes less than 2 seconds. Neurological:      General: No focal deficit present. Mental Status: He is alert and oriented to person, place, and time. Cranial Nerves: No cranial nerve deficit. Sensory: No sensory deficit. Motor: No weakness. Coordination: Coordination normal.      Gait: Gait normal.   Psychiatric:         Mood and Affect: Mood normal.         Behavior: Behavior normal.         Thought Content: Thought content normal.         Judgment: Judgment normal.       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 No limb threatening ischemia. 2) Asymptomatic R tibial occlusive disease  3) H/O tobacco abuse  4) COPD  5) Small asymptomatic AAA (3.4 cm)      Plan:      Continue current walking program.  DC Pletal due to apparent side effects. No intention of intervention at this time unless he worsens or develops limb threatening ischemia. We will follow the diagnosed abdominal aortic aneurysm with yearly duplex scans until it reaches 4 cm at which surveillance will be converted to every 6 months with intended repair in the 5 - 5 1/2 centimeter range. RTO in late August for AAA scan and to discuss claudication again while off Pletal.  If limiting at that time will get duplex to assess for proximal disease suggested on exam (CFA bruit).

## 2021-06-25 ENCOUNTER — HOSPITAL ENCOUNTER (OUTPATIENT)
Age: 86
Discharge: HOME | End: 2021-06-25
Payer: MEDICARE

## 2021-06-25 LAB — TSH SERPL DL<=0.05 MIU/L-ACNC: 0.86 UIU/ML (ref 0.27–4.2)

## 2021-06-25 PROCEDURE — 83036 HEMOGLOBIN GLYCOSYLATED A1C: CPT

## 2021-06-25 PROCEDURE — 36415 COLL VENOUS BLD VENIPUNCTURE: CPT

## 2021-06-25 PROCEDURE — 84443 ASSAY THYROID STIM HORMONE: CPT

## 2021-06-26 LAB
ESTIMATED AVERAGE GLUCOSE: 148.5 MG/DL
HBA1C MFR BLD: 6.8 %

## 2021-09-23 ENCOUNTER — HOSPITAL ENCOUNTER (OUTPATIENT)
Age: 86
Discharge: HOME | End: 2021-09-23
Payer: MEDICARE

## 2021-09-23 LAB
A/G RATIO: 1.1 (ref 1.1–2.2)
ALBUMIN SERPL-MCNC: 3.5 G/DL (ref 3.4–5)
ALP BLD-CCNC: 64 U/L (ref 40–129)
ALT SERPL-CCNC: 13 U/L (ref 10–40)
ANION GAP SERPL CALCULATED.3IONS-SCNC: 12 MMOL/L (ref 3–16)
AST SERPL-CCNC: 23 U/L (ref 15–37)
BASOPHILS ABSOLUTE: 0 K/UL (ref 0–0.2)
BASOPHILS RELATIVE PERCENT: 0.6 %
BILIRUB SERPL-MCNC: <0.2 MG/DL (ref 0–1)
BUN BLDV-MCNC: 19 MG/DL (ref 7–20)
CALCIUM SERPL-MCNC: 8.4 MG/DL (ref 8.3–10.6)
CHLORIDE BLD-SCNC: 98 MMOL/L (ref 99–110)
CHOLESTEROL, TOTAL: 147 MG/DL (ref 0–199)
CO2: 23 MMOL/L (ref 21–32)
CREAT SERPL-MCNC: 1.1 MG/DL (ref 0.8–1.3)
EOSINOPHILS ABSOLUTE: 0 K/UL (ref 0–0.6)
EOSINOPHILS RELATIVE PERCENT: 0.9 %
GFR AFRICAN AMERICAN: >60
GFR NON-AFRICAN AMERICAN: >60
GLOBULIN: 3.3 G/DL
GLUCOSE BLD-MCNC: 128 MG/DL (ref 70–99)
HCT VFR BLD CALC: 36.1 % (ref 40.5–52.5)
HDLC SERPL-MCNC: 49 MG/DL (ref 40–60)
HEMOGLOBIN: 12.1 G/DL (ref 13.5–17.5)
LDL CHOLESTEROL CALCULATED: 76 MG/DL
LYMPHOCYTES ABSOLUTE: 0.7 K/UL (ref 1–5.1)
LYMPHOCYTES RELATIVE PERCENT: 16.8 %
MCH RBC QN AUTO: 31.4 PG (ref 26–34)
MCHC RBC AUTO-ENTMCNC: 33.6 G/DL (ref 31–36)
MCV RBC AUTO: 93.7 FL (ref 80–100)
MONOCYTES ABSOLUTE: 0.6 K/UL (ref 0–1.3)
MONOCYTES RELATIVE PERCENT: 14 %
NEUTROPHILS ABSOLUTE: 2.8 K/UL (ref 1.7–7.7)
NEUTROPHILS RELATIVE PERCENT: 67.7 %
PDW BLD-RTO: 13.9 % (ref 12.4–15.4)
PLATELET # BLD: 168 K/UL (ref 135–450)
PMV BLD AUTO: 8.6 FL (ref 5–10.5)
POTASSIUM SERPL-SCNC: 4.8 MMOL/L (ref 3.5–5.1)
RBC # BLD: 3.86 M/UL (ref 4.2–5.9)
SODIUM BLD-SCNC: 133 MMOL/L (ref 136–145)
TOTAL PROTEIN: 6.8 G/DL (ref 6.4–8.2)
TRIGL SERPL-MCNC: 108 MG/DL (ref 0–150)
TSH SERPL DL<=0.05 MIU/L-ACNC: 0.5 UIU/ML (ref 0.27–4.2)
VLDLC SERPL CALC-MCNC: 22 MG/DL
WBC # BLD: 4.1 K/UL (ref 4–11)

## 2021-09-23 PROCEDURE — 36415 COLL VENOUS BLD VENIPUNCTURE: CPT

## 2021-09-23 PROCEDURE — 83036 HEMOGLOBIN GLYCOSYLATED A1C: CPT

## 2021-09-23 PROCEDURE — 80053 COMPREHEN METABOLIC PANEL: CPT

## 2021-09-23 PROCEDURE — 84443 ASSAY THYROID STIM HORMONE: CPT

## 2021-09-23 PROCEDURE — 80061 LIPID PANEL: CPT

## 2021-09-23 PROCEDURE — 85025 COMPLETE CBC W/AUTO DIFF WBC: CPT

## 2021-09-24 LAB
ESTIMATED AVERAGE GLUCOSE: 148.5 MG/DL
HBA1C MFR BLD: 6.8 %

## 2021-09-28 ENCOUNTER — APPOINTMENT (OUTPATIENT)
Dept: GENERAL RADIOLOGY | Age: 86
DRG: 177 | End: 2021-09-28
Payer: MEDICARE

## 2021-09-28 ENCOUNTER — HOSPITAL ENCOUNTER (INPATIENT)
Age: 86
LOS: 6 days | Discharge: HOME | DRG: 177 | End: 2021-10-04
Attending: EMERGENCY MEDICINE | Admitting: INTERNAL MEDICINE
Payer: MEDICARE

## 2021-09-28 DIAGNOSIS — R09.02 HYPOXIA: ICD-10-CM

## 2021-09-28 DIAGNOSIS — U07.1 COVID-19: Primary | ICD-10-CM

## 2021-09-28 PROBLEM — J96.01 ACUTE RESPIRATORY FAILURE WITH HYPOXIA (HCC): Status: ACTIVE | Noted: 2021-09-28

## 2021-09-28 PROBLEM — J12.82 PNEUMONIA DUE TO COVID-19 VIRUS: Status: ACTIVE | Noted: 2021-09-28

## 2021-09-28 PROBLEM — J44.9 COPD (CHRONIC OBSTRUCTIVE PULMONARY DISEASE) (HCC): Status: ACTIVE | Noted: 2021-09-28

## 2021-09-28 PROBLEM — E03.9 ACQUIRED HYPOTHYROIDISM: Status: ACTIVE | Noted: 2021-09-28

## 2021-09-28 PROBLEM — E11.9 TYPE 2 DIABETES MELLITUS WITHOUT COMPLICATION (HCC): Status: ACTIVE | Noted: 2021-09-28

## 2021-09-28 LAB
A/G RATIO: 0.9 (ref 1.1–2.2)
ALBUMIN SERPL-MCNC: 3.3 G/DL (ref 3.4–5)
ALP BLD-CCNC: 60 U/L (ref 40–129)
ALT SERPL-CCNC: 20 U/L (ref 10–40)
ANION GAP SERPL CALCULATED.3IONS-SCNC: 13 MMOL/L (ref 3–16)
AST SERPL-CCNC: 43 U/L (ref 15–37)
BASOPHILS ABSOLUTE: 0 K/UL (ref 0–0.2)
BASOPHILS RELATIVE PERCENT: 0.4 %
BILIRUB SERPL-MCNC: 0.3 MG/DL (ref 0–1)
BUN BLDV-MCNC: 26 MG/DL (ref 7–20)
CALCIUM SERPL-MCNC: 8 MG/DL (ref 8.3–10.6)
CHLORIDE BLD-SCNC: 97 MMOL/L (ref 99–110)
CO2: 21 MMOL/L (ref 21–32)
CREAT SERPL-MCNC: 1.4 MG/DL (ref 0.8–1.3)
EOSINOPHILS ABSOLUTE: 0 K/UL (ref 0–0.6)
EOSINOPHILS RELATIVE PERCENT: 0 %
GFR AFRICAN AMERICAN: 58
GFR NON-AFRICAN AMERICAN: 48
GLOBULIN: 3.6 G/DL
GLUCOSE BLD-MCNC: 175 MG/DL (ref 70–99)
GLUCOSE BLD-MCNC: 225 MG/DL (ref 70–99)
HCT VFR BLD CALC: 36.8 % (ref 40.5–52.5)
HEMOGLOBIN: 12.4 G/DL (ref 13.5–17.5)
LYMPHOCYTES ABSOLUTE: 0.6 K/UL (ref 1–5.1)
LYMPHOCYTES RELATIVE PERCENT: 11 %
MCH RBC QN AUTO: 31.5 PG (ref 26–34)
MCHC RBC AUTO-ENTMCNC: 33.6 G/DL (ref 31–36)
MCV RBC AUTO: 93.7 FL (ref 80–100)
MONOCYTES ABSOLUTE: 0.7 K/UL (ref 0–1.3)
MONOCYTES RELATIVE PERCENT: 13.4 %
NEUTROPHILS ABSOLUTE: 4 K/UL (ref 1.7–7.7)
NEUTROPHILS RELATIVE PERCENT: 75.2 %
PDW BLD-RTO: 14.1 % (ref 12.4–15.4)
PERFORMED ON: ABNORMAL
PLATELET # BLD: 129 K/UL (ref 135–450)
PMV BLD AUTO: 8.2 FL (ref 5–10.5)
POTASSIUM REFLEX MAGNESIUM: 4.4 MMOL/L (ref 3.5–5.1)
PRO-BNP: 1210 PG/ML (ref 0–449)
PROCALCITONIN: 0.33 NG/ML (ref 0–0.15)
RBC # BLD: 3.93 M/UL (ref 4.2–5.9)
SARS-COV-2: DETECTED
SODIUM BLD-SCNC: 131 MMOL/L (ref 136–145)
TOTAL PROTEIN: 6.9 G/DL (ref 6.4–8.2)
TROPONIN: <0.01 NG/ML
WBC # BLD: 5.4 K/UL (ref 4–11)

## 2021-09-28 PROCEDURE — 6370000000 HC RX 637 (ALT 250 FOR IP): Performed by: INTERNAL MEDICINE

## 2021-09-28 PROCEDURE — 96374 THER/PROPH/DIAG INJ IV PUSH: CPT

## 2021-09-28 PROCEDURE — 85025 COMPLETE CBC W/AUTO DIFF WBC: CPT

## 2021-09-28 PROCEDURE — 6360000002 HC RX W HCPCS: Performed by: EMERGENCY MEDICINE

## 2021-09-28 PROCEDURE — 99283 EMERGENCY DEPT VISIT LOW MDM: CPT

## 2021-09-28 PROCEDURE — 6360000002 HC RX W HCPCS: Performed by: INTERNAL MEDICINE

## 2021-09-28 PROCEDURE — 93005 ELECTROCARDIOGRAM TRACING: CPT | Performed by: EMERGENCY MEDICINE

## 2021-09-28 PROCEDURE — 2060000000 HC ICU INTERMEDIATE R&B

## 2021-09-28 PROCEDURE — 71045 X-RAY EXAM CHEST 1 VIEW: CPT

## 2021-09-28 PROCEDURE — 84145 PROCALCITONIN (PCT): CPT

## 2021-09-28 PROCEDURE — 83880 ASSAY OF NATRIURETIC PEPTIDE: CPT

## 2021-09-28 PROCEDURE — XW033E5 INTRODUCTION OF REMDESIVIR ANTI-INFECTIVE INTO PERIPHERAL VEIN, PERCUTANEOUS APPROACH, NEW TECHNOLOGY GROUP 5: ICD-10-PCS | Performed by: INTERNAL MEDICINE

## 2021-09-28 PROCEDURE — 2700000000 HC OXYGEN THERAPY PER DAY

## 2021-09-28 PROCEDURE — 84484 ASSAY OF TROPONIN QUANT: CPT

## 2021-09-28 PROCEDURE — 1200000000 HC SEMI PRIVATE

## 2021-09-28 PROCEDURE — 80053 COMPREHEN METABOLIC PANEL: CPT

## 2021-09-28 PROCEDURE — 2500000003 HC RX 250 WO HCPCS: Performed by: INTERNAL MEDICINE

## 2021-09-28 PROCEDURE — XW033H5 INTRODUCTION OF TOCILIZUMAB INTO PERIPHERAL VEIN, PERCUTANEOUS APPROACH, NEW TECHNOLOGY GROUP 5: ICD-10-PCS | Performed by: INTERNAL MEDICINE

## 2021-09-28 PROCEDURE — 71046 X-RAY EXAM CHEST 2 VIEWS: CPT

## 2021-09-28 PROCEDURE — 94761 N-INVAS EAR/PLS OXIMETRY MLT: CPT

## 2021-09-28 PROCEDURE — 2580000003 HC RX 258: Performed by: INTERNAL MEDICINE

## 2021-09-28 RX ORDER — DEXAMETHASONE SODIUM PHOSPHATE 10 MG/ML
6 INJECTION, SOLUTION INTRAMUSCULAR; INTRAVENOUS ONCE
Status: COMPLETED | OUTPATIENT
Start: 2021-09-28 | End: 2021-09-28

## 2021-09-28 RX ORDER — DEXTROSE MONOHYDRATE 50 MG/ML
100 INJECTION, SOLUTION INTRAVENOUS PRN
Status: DISCONTINUED | OUTPATIENT
Start: 2021-09-28 | End: 2021-10-04 | Stop reason: HOSPADM

## 2021-09-28 RX ORDER — DEXTROSE MONOHYDRATE 25 G/50ML
12.5 INJECTION, SOLUTION INTRAVENOUS PRN
Status: DISCONTINUED | OUTPATIENT
Start: 2021-09-28 | End: 2021-10-04 | Stop reason: HOSPADM

## 2021-09-28 RX ORDER — METFORMIN HYDROCHLORIDE 500 MG/1
500 TABLET, EXTENDED RELEASE ORAL 3 TIMES DAILY
Status: ON HOLD | COMMUNITY
End: 2022-08-15 | Stop reason: CLARIF

## 2021-09-28 RX ORDER — VITAMIN B COMPLEX
2000 TABLET ORAL DAILY
Status: DISCONTINUED | OUTPATIENT
Start: 2021-09-29 | End: 2021-10-04 | Stop reason: HOSPADM

## 2021-09-28 RX ORDER — INSULIN LISPRO 100 [IU]/ML
0-6 INJECTION, SOLUTION INTRAVENOUS; SUBCUTANEOUS
Status: DISCONTINUED | OUTPATIENT
Start: 2021-09-29 | End: 2021-09-30

## 2021-09-28 RX ORDER — SODIUM CHLORIDE 9 MG/ML
INJECTION, SOLUTION INTRAVENOUS CONTINUOUS
Status: DISCONTINUED | OUTPATIENT
Start: 2021-09-28 | End: 2021-09-30

## 2021-09-28 RX ORDER — SODIUM CHLORIDE 9 MG/ML
25 INJECTION, SOLUTION INTRAVENOUS PRN
Status: DISCONTINUED | OUTPATIENT
Start: 2021-09-28 | End: 2021-10-04 | Stop reason: HOSPADM

## 2021-09-28 RX ORDER — SODIUM CHLORIDE 0.9 % (FLUSH) 0.9 %
5-40 SYRINGE (ML) INJECTION PRN
Status: DISCONTINUED | OUTPATIENT
Start: 2021-09-28 | End: 2021-10-04 | Stop reason: HOSPADM

## 2021-09-28 RX ORDER — ZINC SULFATE 50(220)MG
50 CAPSULE ORAL DAILY
Status: DISCONTINUED | OUTPATIENT
Start: 2021-09-29 | End: 2021-10-04 | Stop reason: HOSPADM

## 2021-09-28 RX ORDER — ACETAMINOPHEN 325 MG/1
650 TABLET ORAL EVERY 6 HOURS PRN
Status: DISCONTINUED | OUTPATIENT
Start: 2021-09-28 | End: 2021-10-04 | Stop reason: HOSPADM

## 2021-09-28 RX ORDER — MULTIVITAMIN WITH IRON
1 TABLET ORAL DAILY
Status: DISCONTINUED | OUTPATIENT
Start: 2021-09-29 | End: 2021-10-04 | Stop reason: HOSPADM

## 2021-09-28 RX ORDER — ONDANSETRON 4 MG/1
4 TABLET, ORALLY DISINTEGRATING ORAL EVERY 8 HOURS PRN
Status: DISCONTINUED | OUTPATIENT
Start: 2021-09-28 | End: 2021-10-04 | Stop reason: HOSPADM

## 2021-09-28 RX ORDER — LATANOPROST 50 UG/ML
1 SOLUTION/ DROPS OPHTHALMIC DAILY
Status: DISCONTINUED | OUTPATIENT
Start: 2021-09-29 | End: 2021-10-04 | Stop reason: HOSPADM

## 2021-09-28 RX ORDER — BUDESONIDE AND FORMOTEROL FUMARATE DIHYDRATE 160; 4.5 UG/1; UG/1
1 AEROSOL RESPIRATORY (INHALATION) 2 TIMES DAILY
Status: DISCONTINUED | OUTPATIENT
Start: 2021-09-28 | End: 2021-10-04 | Stop reason: HOSPADM

## 2021-09-28 RX ORDER — INSULIN LISPRO 100 [IU]/ML
0-3 INJECTION, SOLUTION INTRAVENOUS; SUBCUTANEOUS NIGHTLY
Status: DISCONTINUED | OUTPATIENT
Start: 2021-09-28 | End: 2021-09-30

## 2021-09-28 RX ORDER — ONDANSETRON 2 MG/ML
4 INJECTION INTRAMUSCULAR; INTRAVENOUS EVERY 6 HOURS PRN
Status: DISCONTINUED | OUTPATIENT
Start: 2021-09-28 | End: 2021-10-04 | Stop reason: HOSPADM

## 2021-09-28 RX ORDER — BRIMONIDINE TARTRATE 2 MG/ML
1 SOLUTION/ DROPS OPHTHALMIC 2 TIMES DAILY
Status: DISCONTINUED | OUTPATIENT
Start: 2021-09-28 | End: 2021-10-04 | Stop reason: HOSPADM

## 2021-09-28 RX ORDER — SODIUM CHLORIDE 0.9 % (FLUSH) 0.9 %
5-40 SYRINGE (ML) INJECTION EVERY 12 HOURS SCHEDULED
Status: DISCONTINUED | OUTPATIENT
Start: 2021-09-28 | End: 2021-10-04 | Stop reason: HOSPADM

## 2021-09-28 RX ORDER — TIMOLOL MALEATE 5 MG/ML
1 SOLUTION/ DROPS OPHTHALMIC 2 TIMES DAILY
Status: DISCONTINUED | OUTPATIENT
Start: 2021-09-28 | End: 2021-10-04 | Stop reason: HOSPADM

## 2021-09-28 RX ORDER — FLUTICASONE FUROATE, UMECLIDINIUM BROMIDE AND VILANTEROL TRIFENATATE 100; 62.5; 25 UG/1; UG/1; UG/1
1 POWDER RESPIRATORY (INHALATION) DAILY
COMMUNITY

## 2021-09-28 RX ORDER — POLYETHYLENE GLYCOL 3350 17 G/17G
17 POWDER, FOR SOLUTION ORAL DAILY PRN
Status: DISCONTINUED | OUTPATIENT
Start: 2021-09-28 | End: 2021-10-04 | Stop reason: HOSPADM

## 2021-09-28 RX ORDER — SODIUM CHLORIDE 9 MG/ML
INJECTION, SOLUTION INTRAVENOUS
Status: COMPLETED
Start: 2021-09-28 | End: 2021-09-29

## 2021-09-28 RX ORDER — LEVOTHYROXINE SODIUM 0.15 MG/1
75 TABLET ORAL
Status: DISCONTINUED | OUTPATIENT
Start: 2021-09-29 | End: 2021-10-04 | Stop reason: HOSPADM

## 2021-09-28 RX ORDER — ACETAMINOPHEN 650 MG/1
650 SUPPOSITORY RECTAL EVERY 6 HOURS PRN
Status: DISCONTINUED | OUTPATIENT
Start: 2021-09-28 | End: 2021-10-04 | Stop reason: HOSPADM

## 2021-09-28 RX ORDER — NICOTINE POLACRILEX 4 MG
15 LOZENGE BUCCAL PRN
Status: DISCONTINUED | OUTPATIENT
Start: 2021-09-28 | End: 2021-10-04 | Stop reason: HOSPADM

## 2021-09-28 RX ORDER — DEXAMETHASONE 4 MG/1
6 TABLET ORAL DAILY
Status: DISCONTINUED | OUTPATIENT
Start: 2021-09-28 | End: 2021-10-01

## 2021-09-28 RX ORDER — ALOGLIPTIN 6.25 MG/1
6.25 TABLET, FILM COATED ORAL DAILY
Status: DISCONTINUED | OUTPATIENT
Start: 2021-09-29 | End: 2021-10-04 | Stop reason: HOSPADM

## 2021-09-28 RX ADMIN — ENOXAPARIN SODIUM 30 MG: 30 INJECTION SUBCUTANEOUS at 22:29

## 2021-09-28 RX ADMIN — Medication 10 ML: at 22:23

## 2021-09-28 RX ADMIN — TIMOLOL MALEATE 1 DROP: 5 SOLUTION OPHTHALMIC at 22:31

## 2021-09-28 RX ADMIN — DEXAMETHASONE SODIUM PHOSPHATE 6 MG: 10 INJECTION, SOLUTION INTRAMUSCULAR; INTRAVENOUS at 17:24

## 2021-09-28 RX ADMIN — DEXAMETHASONE 6 MG: 4 TABLET ORAL at 22:30

## 2021-09-28 RX ADMIN — INSULIN LISPRO 1 UNITS: 100 INJECTION, SOLUTION INTRAVENOUS; SUBCUTANEOUS at 22:33

## 2021-09-28 RX ADMIN — SODIUM CHLORIDE: 9 INJECTION, SOLUTION INTRAVENOUS at 22:27

## 2021-09-28 RX ADMIN — BRIMONIDINE TARTRATE 1 DROP: 2 SOLUTION OPHTHALMIC at 22:31

## 2021-09-28 RX ADMIN — REMDESIVIR 200 MG: 100 INJECTION, POWDER, LYOPHILIZED, FOR SOLUTION INTRAVENOUS at 22:28

## 2021-09-28 ASSESSMENT — PAIN SCALES - GENERAL
PAINLEVEL_OUTOF10: 0
PAINLEVEL_OUTOF10: 0

## 2021-09-28 NOTE — ED NOTES
Bed: 27  Expected date:   Expected time:   Means of arrival:   Comments:  tanja Bolton RN  09/28/21 0416

## 2021-09-28 NOTE — ED NOTES
Pharmacy Medication History Note      List of current medications patient is taking is complete. Source of information: Outside Pharmacy     Changes made to medication list:  Medications flagged for removal (include reason, ex. noncompliance):  None     Medications removed (include reason, ex. therapy complete or physician discontinued):  Cilostazol - therapy completed    Medications added/doses adjusted:  Trelegy inhaler 100-62.5-25 mcg daily   Metformin  mg TID     Other notes (ex. Recent course of antibiotics, Coumadin dosing):  None   Denies use of other OTC or herbal medications. Last dose times updated. No current facility-administered medications on file prior to encounter.      Current Outpatient Medications on File Prior to Encounter   Medication Sig Dispense Refill    fluticasone-umeclidin-vilant (TRELEGY ELLIPTA) 100-62.5-25 MCG/INH AEPB Inhale 1 puff into the lungs daily      metFORMIN (GLUCOPHAGE-XR) 500 MG extended release tablet Take 500 mg by mouth 3 times daily      levothyroxine (SYNTHROID) 75 MCG tablet Take 75 mcg by mouth Daily      JANUVIA 50 MG tablet Take 50 mg by mouth daily       COMBIGAN 0.2-0.5 % ophthalmic solution Place 1 drop into both eyes every 12 hours       latanoprost (XALATAN) 0.005 % ophthalmic solution Place 1 drop into both eyes daily       Multiple Vitamin (MULTIVITAMIN) tablet Take 1 tablet by mouth daily      triamcinolone (KENALOG) 0.1 % ointment       [DISCONTINUED] cilostazol (PLETAL) 100 MG tablet Take 1 tablet by mouth 2 times daily 60 tablet 3     Jaxon Jaeger, PharmD    PGY1 Pharmacy Resident   [id number]

## 2021-09-28 NOTE — ED TRIAGE NOTES
Pt directed to come to ER per PCP d/t rapid Covid +. Upon arrival SpO2 @ 84%.  O2 placed and increased to 91 at 3 L NC

## 2021-09-28 NOTE — ED PROVIDER NOTES
[address] [facility] PROVIDER NOTE    Patient Identification  Pt Name: Jolene Johnson  MRN: [id number]  Yandelgfjoseluis 1935  Date of evaluation: 9/28/2021  Provider: Radha Ferro MD  PCP: Nava Cisse MD    Chief Complaint  Shortness of Breath (sent per MD for SOB, weakness to legs, fever, coughing; rapid covid +)      HPI  History provided by patient   This is a 82 y.o. male who presents to the ED for shortness of breath. He has known Covid positive. Sent from his [de-identified] office to be admitted to the hospital for hypoxia to the mid 80s. Symptoms ongoing for almost 2 weeks. Associated with cough. Denies any chest discomfort. Does have bilateral leg weakness without pain or swelling. No nausea or vomiting. No diarrhea or constipation. Not having any pain anywhere. Decreased oral intake. [facility]  10 systems reviewed, pertinent positives/negatives per HPI otherwise noted to be negative. I have reviewed the following nursing documentation:  Allergies: Naproxen    Past medical history: No past medical history on file. Past surgical history:   Past Surgical History:   Procedure Laterality Date    CARPAL TUNNEL RELEASE Bilateral     CYSTOSCOPY      LUMBAR SPINE SURGERY         Home medications:   Previous Medications    CILOSTAZOL (PLETAL) 100 MG TABLET    Take 1 tablet by mouth 2 times daily    COMBIGAN 0.2-0.5 % OPHTHALMIC SOLUTION        JANUVIA 50 MG TABLET        LATANOPROST (XALATAN) 0.005 % OPHTHALMIC SOLUTION        LEVOTHYROXINE (SYNTHROID) 75 MCG TABLET    Take 75 mcg by mouth Daily    MULTIPLE VITAMIN (MULTIVITAMIN) TABLET    Take 1 tablet by mouth daily    TRIAMCINOLONE (KENALOG) 0.1 % OINTMENT           Social history:  reports that he has quit smoking.  He has quit using smokeless tobacco.    Family history:    Family History   Family history unknown: Yes         Exam  ED Triage Vitals   BP Temp Temp src Pulse Resp SpO2 Height Weight   -- -- -- -- -- -- -- --     Nursing note and vitals reviewed. Constitutional: In no acute distress. Weak appearing  HENT:      Head: Normocephalic      Ears: External ears normal.      Nose: Nose normal.     Mouth: Membrane mucosa moist   Eyes: No discharge. Neck: Supple. Trachea midline. Cardiovascular: Regular rate. Warm extremities  Pulmonary/Chest: Effort normal. No respiratory distress. CTABL  Abdominal: Soft. No distension. Nontender  : Deferred  Rectal: Deferred   Musculoskeletal: Moves all extremities. No gross deformity. Neurological: Alert and oriented. Face symmetric. Speech is clear. Skin: Warm and dry. Psychiatric: Normal mood and affect. Behavior is normal.    Procedures      EKG  The Ekg interpreted by me in the absence of a cardiologist shows. Normal sinus rhythm. No specific ST changes appreciated. HR 93  No prior EKG for comparison      Radiology  No orders to display       Labs  No results found for this visit on 09/28/21. Screenings           MDM and ED Course  This is a 82 y.o. male who presents to the ED for dyspnea. Known COVID-19 positive. Found to be hypoxic. No pleuritic chest discomfort to indicate pulmonary embolism. No unilateral leg swelling. Sent by Dr. Sandrine Madrigal. Anticipate admission to his service. Lower suspicion for ACS given lack of chest discomfort. Will start on Decadron. --------    Patient required 3 L of oxygen. And is slight increase in work of breathing but does not appear to be tiring out. Chest x-ray shows possible Covid pneumonia. Has what appears to be mild TAYLER as well. There is nonspecific LFT elevation without any abdominal pain to indicate cholecystitis. Has elevated BNP, may be element of failure here. [unfilled]    Final Impression  1. COVID-19    2. Hypoxia        There were no vitals taken for this visit.      Disposition:  DISPOSITION Decision To Admit 09/28/2021 03:32:54 PM      Patient Referrals:  No follow-up provider specified. Discharge Medications:  New Prescriptions    No medications on file       Discontinued Medications:  Discontinued Medications    No medications on file       This chart was generated using the [address] [place] [address] dictation system. I created this record but it may contain dictation errors given the limitations of this technology.         Jan Clemente MD  09/28/21 1194

## 2021-09-28 NOTE — H&P
Department of Internal Medicine  History & Physical      SUBJECTIVE:  The patient is an 81 yo  male with pmh significant for copd, type 2 diabetes, hypothyroidism, who originally presented to our office today not feeling well for1 week. Pt is unvaccinated and states he has not felt well for approximately 1 week. States he had been to a  about 10 days ago for his brother who passed from renal failure. Did not mask at the .  States he has had a congested cough associated with shortness of breath and wheezing for 1 week. He feels tired and has decreased appetite. He has been taking his medications regularly. He denies any chest pain, dizziness, palpitations or headaches. Denies any nausea, vomiting, diarrhea or constipation. Has not been taking anything for his symptoms. Denies any fever or chills but had temp of 100 today in our office. Found to be hypoxic with Oxgen saturation of 85-86%. Did not appear tachypneic or using accessory muscles. Pt with + rapid covid today in our office. ER work up showed cxr + for covid pneumonia. Pt to be admitted for further evaluation and management. ALLERGIES:   Allergies   Allergen Reactions    Naproxen Rash      MEDICATIONS:   Prior to Admission medications    Medication Sig Start Date End Date Taking?  Authorizing Provider   fluticasone-umeclidin-vilant (TRELEGY ELLIPTA) 100-62.5-25 MCG/INH AEPB Inhale 1 puff into the lungs daily   Yes Historical Provider, MD   metFORMIN (GLUCOPHAGE-XR) 500 MG extended release tablet Take 500 mg by mouth 3 times daily   Yes Historical Provider, MD   levothyroxine (SYNTHROID) 75 MCG tablet Take 75 mcg by mouth Daily   Yes Historical Provider, MD   JANUVIA 50 MG tablet Take 50 mg by mouth daily  3/29/21  Yes Historical Provider, MD   COMBIGAN 0.2-0.5 % ophthalmic solution Place 1 drop into both eyes every 12 hours  21  Yes Historical Provider, MD   latanoprost (XALATAN) 0.005 % ophthalmic solution Place 1 drop into both eyes daily  20  Yes Historical Provider, MD   Multiple Vitamin (MULTIVITAMIN) tablet Take 1 tablet by mouth daily   Yes Historical Provider, MD   triamcinolone (KENALOG) 0.1 % ointment  20  Yes Historical Provider, MD     PM History reviewed. No pertinent past medical history.    PSH:       Procedure Laterality Date    CARPAL TUNNEL RELEASE Bilateral     CYSTOSCOPY      LUMBAR SPINE SURGERY        FAMILY HISTORY:       Family history unknown: Yes      SOCIAL HISTORY:   Social History     Tobacco Use    Smoking status: Former Smoker    Smokeless tobacco: Former User   Substance Use Topics    Alcohol use: Not Currently        REVIEW OF SYSTEMS: -   General ROS:denies any headache, + for weakness  Ophthalmic ROS: denies any blurred vision, double vision or vision loss  ENT ROS: denies any epistaxis, nasal discharge  Hematological and Lymphatic: denies any fatigue, night sweats, enlarge lymph nodes or bruising ,   Respiratory ROS: + for congested cough, increased shortness of breath, dyspnea on exertion,and wheezing,   Cardiovascular ROS: denies any chest pain, palpitations, dizziness syncope or swelling in extremities  Gastrointestinal ROS: denies any abdominal pain, acid reflux, change in bowel habits or blood in stool, dark or tarry stools  , endorses decreased appetite, has lost about 8lbs   Musculoskeletal ROS:denies any back pain or joint pain,  Neurological ROS: denies any mental status changes, seizures, memory loss, speech problems or muscle weakness in extremities   Dermatological ROS: denies any rash or skin lesions     OBJECTIVE:      PHYSICAL:   VITALS:  /82   Pulse 89   Temp 98.1 °F (36.7 °C) (Oral)   Resp (!) 31   Ht 5' 4\" (1.626 m)   Wt 128 lb 4.8 oz (58.2 kg)   SpO2 93%   BMI 22.02 kg/m²   PULSE OXIMETRY RANGE: SpO2  Av.9 %  Min: 84 %  Max: 96 %  No intake or output data in the 24 hours ending 21 2091   CONSTITUTIONAL:  awake, alert, cooperative, no apparent distress, and appears stated age  HEAD:  Normocephalic, atraumatic  HEENT:  ENT exam normal, no neck nodes or sinus tenderness  EYE: conjunctivae/corneas clear. PERRL, EOM's intact. Fundi benign. NECK:  Supple, symmetrical, trachea midline, no adenopathy, thyroid symmetric, not enlarged and no tenderness, skin normal  LUNGS:  No increased work of breathing noted, breath sounds decreased in bases, crackles noted posteriorly, faint expiratory wheezes anteriorly  CARDIOVASCULAR:  Normal apical impulse, regular rate and rhythm, normal S1 and S2, no S3 or S4, and no murmur noted  ABDOMEN:  Normal bowel sounds, soft, non-distended, non-tender, no masses palpated, no hepatosplenomegally  MUSCULOSKELETAL:  There is no redness, warmth, or swelling of the joints. Full range of motion noted. Motor strength is 5 out of 5 all extremities bilaterally. Tone is normal.  NEUROLOGIC:  Awake, alert, oriented to name, place and time. Cranial nerves II-XII are grossly intact. Motor is 5 out of 5 bilaterally. Cerebellar finger to nose, heel to shin intact. Sensory is intact.   Babinski down going, Romberg negative, and gait is normal.  SKIN:  normal skin color, texture, turgor  EDEMA: Normal    Data    Labs Reviewed   CBC WITH AUTO DIFFERENTIAL - Abnormal; Notable for the following components:       Result Value    RBC 3.93 (*)     Hemoglobin 12.4 (*)     Hematocrit 36.8 (*)     Platelets 137 (*)     Lymphocytes Absolute 0.6 (*)     All other components within normal limits    Narrative:     Performed at:  [facility] [address], [place], [address]   Phone [phone]   COMPREHENSIVE METABOLIC PANEL W/ REFLEX TO MG FOR LOW K - Abnormal; Notable for the following components:    Sodium 131 (*)     Chloride 97 (*)     Glucose 175 (*)     BUN 26 (*)     CREATININE 1.4 (*)     GFR Non- 48 (*)     GFR  58 (*)     Calcium 8.0 (*)     Albumin 3.3 (*) the mid right lung. The left lung is relatively clear. There is no pleural fluid or evidence of overt failure. Peripheral ground-glass opacification in the right lung. Findings concerning for pneumonia including COVID-19 pneumonia. ASSESSMENT      Patient Active Problem List   Diagnosis    Pneumonia due to COVID-19 virus    Type [facility] diabetes mellitus without complication ([facility]    COPD (chronic obstructive pulmonary disease) ([facility]    Acquired hypothyroidism    [facility]         PLAN  1. Admit for Acute Respiratory Failure with Hypoxia secondary to Pneumonia due to Covid 19.   2. Will start on decadron and remdesivir. 3. Will start on lovenox for dvt prophylaxis. 4. Start on home medications. 5. Accu check qac and hs.   6. Low dose sliding scale insulin. 7. Recommended pulmonary toilet, oob or proning while in bed. 8. Check crp and d dimer. 9. Case discussed with patient and his friend. 10. Plan of care discussed with Dr. Berny Parker who is in agreement with the plan of care.

## 2021-09-29 LAB
ANION GAP SERPL CALCULATED.3IONS-SCNC: 11 MMOL/L (ref 3–16)
BASOPHILS ABSOLUTE: 0 K/UL (ref 0–0.2)
BASOPHILS RELATIVE PERCENT: 0.3 %
BUN BLDV-MCNC: 26 MG/DL (ref 7–20)
C-REACTIVE PROTEIN: 110.7 MG/L (ref 0–5.1)
CALCIUM SERPL-MCNC: 8.2 MG/DL (ref 8.3–10.6)
CHLORIDE BLD-SCNC: 101 MMOL/L (ref 99–110)
CO2: 24 MMOL/L (ref 21–32)
CREAT SERPL-MCNC: 1.1 MG/DL (ref 0.8–1.3)
D DIMER: 678 NG/ML DDU (ref 0–229)
EKG ATRIAL RATE: 92 BPM
EKG DIAGNOSIS: NORMAL
EKG Q-T INTERVAL: 348 MS
EKG QRS DURATION: 74 MS
EKG QTC CALCULATION (BAZETT): 432 MS
EKG R AXIS: 60 DEGREES
EKG T AXIS: 62 DEGREES
EKG VENTRICULAR RATE: 93 BPM
EOSINOPHILS ABSOLUTE: 0 K/UL (ref 0–0.6)
EOSINOPHILS RELATIVE PERCENT: 0 %
FIBRINOGEN: 580 MG/DL (ref 200–397)
GFR AFRICAN AMERICAN: >60
GFR NON-AFRICAN AMERICAN: >60
GLUCOSE BLD-MCNC: 210 MG/DL (ref 70–99)
GLUCOSE BLD-MCNC: 219 MG/DL (ref 70–99)
GLUCOSE BLD-MCNC: 220 MG/DL (ref 70–99)
GLUCOSE BLD-MCNC: 247 MG/DL (ref 70–99)
GLUCOSE BLD-MCNC: 266 MG/DL (ref 70–99)
HCT VFR BLD CALC: 39.4 % (ref 40.5–52.5)
HEMOGLOBIN: 13.3 G/DL (ref 13.5–17.5)
LYMPHOCYTES ABSOLUTE: 0.5 K/UL (ref 1–5.1)
LYMPHOCYTES RELATIVE PERCENT: 14.2 %
MCH RBC QN AUTO: 31.5 PG (ref 26–34)
MCHC RBC AUTO-ENTMCNC: 33.9 G/DL (ref 31–36)
MCV RBC AUTO: 93 FL (ref 80–100)
MONOCYTES ABSOLUTE: 0.3 K/UL (ref 0–1.3)
MONOCYTES RELATIVE PERCENT: 9.8 %
NEUTROPHILS ABSOLUTE: 2.5 K/UL (ref 1.7–7.7)
NEUTROPHILS RELATIVE PERCENT: 75.7 %
PDW BLD-RTO: 14.4 % (ref 12.4–15.4)
PERFORMED ON: ABNORMAL
PLATELET # BLD: 128 K/UL (ref 135–450)
PMV BLD AUTO: 8.7 FL (ref 5–10.5)
POTASSIUM REFLEX MAGNESIUM: 3.9 MMOL/L (ref 3.5–5.1)
RBC # BLD: 4.23 M/UL (ref 4.2–5.9)
SODIUM BLD-SCNC: 136 MMOL/L (ref 136–145)
WBC # BLD: 3.3 K/UL (ref 4–11)

## 2021-09-29 PROCEDURE — 6360000002 HC RX W HCPCS: Performed by: INTERNAL MEDICINE

## 2021-09-29 PROCEDURE — 2060000000 HC ICU INTERMEDIATE R&B

## 2021-09-29 PROCEDURE — 2500000003 HC RX 250 WO HCPCS: Performed by: INTERNAL MEDICINE

## 2021-09-29 PROCEDURE — 86140 C-REACTIVE PROTEIN: CPT

## 2021-09-29 PROCEDURE — 2580000003 HC RX 258: Performed by: INTERNAL MEDICINE

## 2021-09-29 PROCEDURE — 94640 AIRWAY INHALATION TREATMENT: CPT

## 2021-09-29 PROCEDURE — 6360000002 HC RX W HCPCS: Performed by: NURSE PRACTITIONER

## 2021-09-29 PROCEDURE — 93010 ELECTROCARDIOGRAM REPORT: CPT | Performed by: INTERNAL MEDICINE

## 2021-09-29 PROCEDURE — 36415 COLL VENOUS BLD VENIPUNCTURE: CPT

## 2021-09-29 PROCEDURE — 85379 FIBRIN DEGRADATION QUANT: CPT

## 2021-09-29 PROCEDURE — 85025 COMPLETE CBC W/AUTO DIFF WBC: CPT

## 2021-09-29 PROCEDURE — 6370000000 HC RX 637 (ALT 250 FOR IP): Performed by: INTERNAL MEDICINE

## 2021-09-29 PROCEDURE — 85384 FIBRINOGEN ACTIVITY: CPT

## 2021-09-29 PROCEDURE — 80048 BASIC METABOLIC PNL TOTAL CA: CPT

## 2021-09-29 PROCEDURE — 2580000003 HC RX 258

## 2021-09-29 RX ADMIN — TIMOLOL MALEATE 1 DROP: 5 SOLUTION OPHTHALMIC at 08:50

## 2021-09-29 RX ADMIN — BRIMONIDINE TARTRATE 1 DROP: 2 SOLUTION OPHTHALMIC at 21:05

## 2021-09-29 RX ADMIN — INSULIN LISPRO 2 UNITS: 100 INJECTION, SOLUTION INTRAVENOUS; SUBCUTANEOUS at 08:46

## 2021-09-29 RX ADMIN — LEVOTHYROXINE SODIUM 75 MCG: 0.15 TABLET ORAL at 05:22

## 2021-09-29 RX ADMIN — THERA TABS 1 TABLET: TAB at 08:44

## 2021-09-29 RX ADMIN — TIMOLOL MALEATE 1 DROP: 5 SOLUTION OPHTHALMIC at 21:10

## 2021-09-29 RX ADMIN — Medication 1 PUFF: at 19:38

## 2021-09-29 RX ADMIN — SODIUM CHLORIDE: 9 INJECTION, SOLUTION INTRAVENOUS at 17:20

## 2021-09-29 RX ADMIN — ENOXAPARIN SODIUM 30 MG: 30 INJECTION SUBCUTANEOUS at 08:45

## 2021-09-29 RX ADMIN — DEXAMETHASONE 6 MG: 4 TABLET ORAL at 08:44

## 2021-09-29 RX ADMIN — Medication 10 ML: at 08:51

## 2021-09-29 RX ADMIN — INSULIN LISPRO 2 UNITS: 100 INJECTION, SOLUTION INTRAVENOUS; SUBCUTANEOUS at 21:06

## 2021-09-29 RX ADMIN — Medication 2000 UNITS: at 08:44

## 2021-09-29 RX ADMIN — ENOXAPARIN SODIUM 60 MG: 60 INJECTION SUBCUTANEOUS at 21:05

## 2021-09-29 RX ADMIN — BRIMONIDINE TARTRATE 1 DROP: 2 SOLUTION OPHTHALMIC at 08:51

## 2021-09-29 RX ADMIN — LATANOPROST 1 DROP: 50 SOLUTION OPHTHALMIC at 08:45

## 2021-09-29 RX ADMIN — TOCILIZUMAB 486 MG: 180 INJECTION, SOLUTION SUBCUTANEOUS at 13:55

## 2021-09-29 RX ADMIN — ZINC SULFATE 220 MG (50 MG) CAPSULE 50 MG: CAPSULE at 08:44

## 2021-09-29 RX ADMIN — ALOGLIPTIN 6.25 MG: 6.25 TABLET, FILM COATED ORAL at 08:44

## 2021-09-29 RX ADMIN — INSULIN LISPRO 2 UNITS: 100 INJECTION, SOLUTION INTRAVENOUS; SUBCUTANEOUS at 17:20

## 2021-09-29 RX ADMIN — INSULIN LISPRO 2 UNITS: 100 INJECTION, SOLUTION INTRAVENOUS; SUBCUTANEOUS at 12:34

## 2021-09-29 RX ADMIN — REMDESIVIR 100 MG: 100 INJECTION, POWDER, LYOPHILIZED, FOR SOLUTION INTRAVENOUS at 08:59

## 2021-09-29 ASSESSMENT — PAIN SCALES - GENERAL
PAINLEVEL_OUTOF10: 0
PAINLEVEL_OUTOF10: 0

## 2021-09-29 NOTE — CARE COORDINATION
CM attempted to call pt in room due to + Covid status and no answer at this time. CM will attempt later as time allows.     Isabela Sainz RN, BSN  [phone]

## 2021-09-29 NOTE — PLAN OF CARE
Problem: Falls - Risk of:  Goal: Will remain free from falls  Description: Will remain free from falls  Outcome: Ongoing  Note: No falls. Patient bed alarm on, side rails x2 up, call light within reach and gripper socks in place. Problem: Falls - Risk of:  Goal: Absence of physical injury  Description: Absence of physical injury  Outcome: Ongoing  Note: No injuries this shift. Will continue to assess and monitor. Problem: Skin Integrity:  Goal: Absence of new skin breakdown  Description: Absence of new skin breakdown  Outcome: Ongoing  Note: No new skin breakdown identified. Problem: Body Temperature -  Risk of, Imbalanced  Goal: Ability to maintain a body temperature within defined limits  Outcome: Ongoing  Note: Patient was really cold this am. Temperature was not reading. His skin was cold and had goose bumps all over. RN provided 2 warm blankets. Patient became warm and temperature was within normal limits by mouth.

## 2021-09-29 NOTE — CARE COORDINATION
RN states pt has phone by him. CM has attempted to call room multiple times and pt not answering. Chart reviewed at this time. Pt on 5 liters of oxygen. IV Remdesivir.      Lexa Hamlin RN, BSN  [phone]

## 2021-09-29 NOTE — PROGRESS NOTES
Department of Internal Medicine  Progress Note      SUBJECTIVE:  The patient was seen and examined at the bedside this morning. The patient denies any chest pain or worsening shortness of breath. He is up to 5lpm of oxgen per nasal cannula. He appears comfortable. He denies any complaints today. Review of Systems - General ROS:denies any headache but + for generalized weakness  Ophthalmic ROS: denies any blurred vision, double vision or vision loss  ENT ROS: denies any epistaxis, nasal discharge  Hematological and Lymphatic denies any fatigue, night sweats, enlarge lymph nodes or bruising ,   Respiratory ROS: denies any worsening shortness of breath, dyspnea on exertion, wheezing, + for congested cough   Cardiovascular ROS: denies any chest pain, palpitations, dizziness syncope or swelling in extremities  Gastrointestinal ROS: denies any abdominal pain, acid reflux, change in bowel habits or blood in stool, dark or tarry stools     Musculoskeletal ROS:denies any back pain or joint pain,  Neurological ROS: denies any mental status changes, seizures, memory loss, speech problems or muscle weakness in extremities   Dermatological ROS: denies any rash or skin lesions     OBJECTIVE:      PHYSICAL:   VITALS:  /81   Pulse 82   Temp 97.5 °F (36.4 °C) (Oral)   Resp 20   Ht 5' 4\" (1.626 m)   Wt 137 lb (62.1 kg)   SpO2 91%   BMI 23.52 kg/m²   PULSE OXIMETRY RANGE: SpO2  Av.5 %  Min: 84 %  Max: 96 %  No intake or output data in the 24 hours ending 21 0850   CONSTITUTIONAL:  awake, alert, cooperative, no apparent distress, and appears stated age  HEAD:  Normocephalic, atraumatic  HEENT:  ENT exam normal, no neck nodes or sinus tenderness  EYE: conjunctivae/corneas clear. PERRL, EOM's intact. Fundi benign.   NECK:  Supple, symmetrical, trachea midline, no adenopathy, thyroid symmetric, not enlarged and no tenderness, skin normal  LUNGS:  No increased work of breathing noted, breath sounds decreased in bilateral bases, faint expiratory wheezing noted anteriorly/posteriorly, faint crackles posteriorly  CARDIOVASCULAR:  Normal apical impulse, regular rate and rhythm, normal S1 and S2, no S3 or S4, and no murmur noted  ABDOMEN:  Normal bowel sounds, soft, non-distended, non-tender, no masses palpated, no hepatosplenomegally  MUSCULOSKELETAL:  There is no redness, warmth, or swelling of the joints. Full range of motion noted. Motor strength is 5 out of 5 all extremities bilaterally. Tone is normal.  NEUROLOGIC:  Awake, alert, oriented to name, place and time. Cranial nerves II-XII are grossly intact. Motor is 5 out of 5 bilaterally. Cerebellar finger to nose, heel to shin intact.    SKIN:  normal skin color, texture, turgor  EDEMA: Normal    Data      CBC with Differential:    Lab Results   Component Value Date    WBC 3.3 09/29/2021    RBC 4.23 09/29/2021    HGB 13.3 09/29/2021    HCT 39.4 09/29/2021     09/29/2021    MCV 93.0 09/29/2021    MCH 31.5 09/29/2021    MCHC 33.9 09/29/2021    RDW 14.4 09/29/2021    LYMPHOPCT 14.2 09/29/2021    MONOPCT 9.8 09/29/2021    BASOPCT 0.3 09/29/2021    MONOSABS 0.3 09/29/2021    LYMPHSABS 0.5 09/29/2021    EOSABS 0.0 09/29/2021    BASOSABS 0.0 09/29/2021     CMP:    Lab Results   Component Value Date     09/29/2021    K 3.9 09/29/2021     09/29/2021    CO2 24 09/29/2021    BUN 26 09/29/2021    CREATININE 1.1 09/29/2021    GFRAA >60 09/29/2021    AGRATIO 0.9 09/28/2021    LABGLOM >60 09/29/2021    GLUCOSE 219 09/29/2021    PROT 6.9 09/28/2021    LABALBU 3.3 09/28/2021    CALCIUM 8.2 09/29/2021    BILITOT 0.3 09/28/2021    ALKPHOS 60 09/28/2021    AST 43 09/28/2021    ALT 20 09/28/2021     PT/INR:  No results found for: PROTIME, INR  Fibrinogen Level:  No components found for: FIB  Lab Results   Component Value Date    DDIMER 678 (H) 09/29/2021         ASSESSMENT      Patient Active Problem List   Diagnosis    Pneumonia due to COVID-19 virus    Type 2 [facility] mellitus without complication [facility]    COPD (chronic obstructive pulmonary disease) ([facility]    Acquired hypothyroidism    Acute respiratory failure with hypoxia ([facility]         PLAN  1. Continue with remdesivir and decadron as ordered. 2. D-dimer and fibrinogen elevated- will change lovenox to therapeutic dosing. 3. Encouraged oob or proning while in bed. 4. Will continue to follow labs- repeat labs in am.   5. Plan of care discussed with patient. 6. Case discussed with Dr. Julio Lala who is in agreement with the plan of care.

## 2021-09-29 NOTE — PROGRESS NOTES
Pt arrived to unit @2045 in stable condition. Vital signs stable, except HR elevated, and SpO2 93% on 4L. Head-to-toe and 4 Eyes skin assessments completed. Pt is tachypneic, with a significant wet cough. Pt complains of no pain or discomfort other than cough. Patient started on Remdesivir and IVFs. Patient oriented to room and use of call light. Pt instructed to call when he needs to get up. Pt given a turkey sandwich. Patient is an [address] [address]. Pt educated on first dose of Insulin. Pt does not normally take Insulin at home. Patient expresses no further needs at this time. Bed alarm engaged.

## 2021-09-30 LAB
C-REACTIVE PROTEIN: 53.4 MG/L (ref 0–5.1)
D DIMER: 847 NG/ML DDU (ref 0–229)
GLUCOSE BLD-MCNC: 185 MG/DL (ref 70–99)
GLUCOSE BLD-MCNC: 216 MG/DL (ref 70–99)
GLUCOSE BLD-MCNC: 218 MG/DL (ref 70–99)
GLUCOSE BLD-MCNC: 236 MG/DL (ref 70–99)
PERFORMED ON: ABNORMAL
PROCALCITONIN: 0.2 NG/ML (ref 0–0.15)

## 2021-09-30 PROCEDURE — 94761 N-INVAS EAR/PLS OXIMETRY MLT: CPT

## 2021-09-30 PROCEDURE — 94640 AIRWAY INHALATION TREATMENT: CPT

## 2021-09-30 PROCEDURE — 2700000000 HC OXYGEN THERAPY PER DAY

## 2021-09-30 PROCEDURE — 85379 FIBRIN DEGRADATION QUANT: CPT

## 2021-09-30 PROCEDURE — 84145 PROCALCITONIN (PCT): CPT

## 2021-09-30 PROCEDURE — 86140 C-REACTIVE PROTEIN: CPT

## 2021-09-30 PROCEDURE — 2580000003 HC RX 258: Performed by: INTERNAL MEDICINE

## 2021-09-30 PROCEDURE — 36415 COLL VENOUS BLD VENIPUNCTURE: CPT

## 2021-09-30 PROCEDURE — 2060000000 HC ICU INTERMEDIATE R&B

## 2021-09-30 PROCEDURE — 6360000002 HC RX W HCPCS: Performed by: INTERNAL MEDICINE

## 2021-09-30 PROCEDURE — 6370000000 HC RX 637 (ALT 250 FOR IP): Performed by: INTERNAL MEDICINE

## 2021-09-30 PROCEDURE — 6360000002 HC RX W HCPCS: Performed by: NURSE PRACTITIONER

## 2021-09-30 PROCEDURE — 2500000003 HC RX 250 WO HCPCS: Performed by: INTERNAL MEDICINE

## 2021-09-30 RX ORDER — INSULIN LISPRO 100 [IU]/ML
0-6 INJECTION, SOLUTION INTRAVENOUS; SUBCUTANEOUS NIGHTLY
Status: DISCONTINUED | OUTPATIENT
Start: 2021-09-30 | End: 2021-10-02

## 2021-09-30 RX ORDER — DEXTROSE MONOHYDRATE 50 MG/ML
100 INJECTION, SOLUTION INTRAVENOUS PRN
Status: DISCONTINUED | OUTPATIENT
Start: 2021-09-30 | End: 2021-10-04 | Stop reason: HOSPADM

## 2021-09-30 RX ORDER — DEXTROSE MONOHYDRATE 25 G/50ML
12.5 INJECTION, SOLUTION INTRAVENOUS PRN
Status: DISCONTINUED | OUTPATIENT
Start: 2021-09-30 | End: 2021-10-04 | Stop reason: HOSPADM

## 2021-09-30 RX ORDER — INSULIN LISPRO 100 [IU]/ML
0-12 INJECTION, SOLUTION INTRAVENOUS; SUBCUTANEOUS
Status: DISCONTINUED | OUTPATIENT
Start: 2021-09-30 | End: 2021-10-02

## 2021-09-30 RX ORDER — NICOTINE POLACRILEX 4 MG
15 LOZENGE BUCCAL PRN
Status: DISCONTINUED | OUTPATIENT
Start: 2021-09-30 | End: 2021-10-04 | Stop reason: HOSPADM

## 2021-09-30 RX ADMIN — INSULIN LISPRO 2 UNITS: 100 INJECTION, SOLUTION INTRAVENOUS; SUBCUTANEOUS at 21:51

## 2021-09-30 RX ADMIN — DEXAMETHASONE 6 MG: 4 TABLET ORAL at 08:56

## 2021-09-30 RX ADMIN — LEVOTHYROXINE SODIUM 75 MCG: 0.15 TABLET ORAL at 05:57

## 2021-09-30 RX ADMIN — REMDESIVIR 100 MG: 100 INJECTION, POWDER, LYOPHILIZED, FOR SOLUTION INTRAVENOUS at 09:21

## 2021-09-30 RX ADMIN — LATANOPROST 1 DROP: 50 SOLUTION OPHTHALMIC at 09:06

## 2021-09-30 RX ADMIN — ENOXAPARIN SODIUM 60 MG: 60 INJECTION SUBCUTANEOUS at 08:56

## 2021-09-30 RX ADMIN — Medication 1 PUFF: at 20:07

## 2021-09-30 RX ADMIN — THERA TABS 1 TABLET: TAB at 08:56

## 2021-09-30 RX ADMIN — INSULIN LISPRO 1 UNITS: 100 INJECTION, SOLUTION INTRAVENOUS; SUBCUTANEOUS at 09:07

## 2021-09-30 RX ADMIN — INSULIN LISPRO 2 UNITS: 100 INJECTION, SOLUTION INTRAVENOUS; SUBCUTANEOUS at 12:13

## 2021-09-30 RX ADMIN — INSULIN LISPRO 4 UNITS: 100 INJECTION, SOLUTION INTRAVENOUS; SUBCUTANEOUS at 18:12

## 2021-09-30 RX ADMIN — ENOXAPARIN SODIUM 60 MG: 60 INJECTION SUBCUTANEOUS at 21:49

## 2021-09-30 RX ADMIN — Medication 10 ML: at 21:49

## 2021-09-30 RX ADMIN — BRIMONIDINE TARTRATE 1 DROP: 2 SOLUTION OPHTHALMIC at 09:02

## 2021-09-30 RX ADMIN — ALOGLIPTIN 6.25 MG: 6.25 TABLET, FILM COATED ORAL at 08:56

## 2021-09-30 RX ADMIN — TIMOLOL MALEATE 1 DROP: 5 SOLUTION OPHTHALMIC at 11:15

## 2021-09-30 RX ADMIN — TIMOLOL MALEATE 1 DROP: 5 SOLUTION OPHTHALMIC at 21:49

## 2021-09-30 RX ADMIN — SODIUM CHLORIDE: 9 INJECTION, SOLUTION INTRAVENOUS at 08:52

## 2021-09-30 RX ADMIN — TIOTROPIUM BROMIDE INHALATION SPRAY 2 PUFF: 3.12 SPRAY, METERED RESPIRATORY (INHALATION) at 11:28

## 2021-09-30 RX ADMIN — BRIMONIDINE TARTRATE 1 DROP: 2 SOLUTION OPHTHALMIC at 21:49

## 2021-09-30 RX ADMIN — Medication 2000 UNITS: at 08:56

## 2021-09-30 RX ADMIN — ZINC SULFATE 220 MG (50 MG) CAPSULE 50 MG: CAPSULE at 08:56

## 2021-09-30 RX ADMIN — Medication 1 PUFF: at 11:28

## 2021-09-30 RX ADMIN — Medication 10 ML: at 09:13

## 2021-09-30 ASSESSMENT — PAIN SCALES - GENERAL
PAINLEVEL_OUTOF10: 0

## 2021-09-30 NOTE — PROGRESS NOTES
VSS. Alert and oriented. O2 sats 92% on 5L NC. Head to toe assessment completed. Scheduled medications given. Patient denies being in pain. No needs expressed at this time.

## 2021-09-30 NOTE — PLAN OF CARE
Problem: Falls - Risk of:  Goal: Will remain free from falls  Description: Will remain free from falls  9/30/2021 1236 by Anum Fleming RN  Outcome: Ongoing  9/30/2021 0150 by Carter Cordon RN  Outcome: Ongoing  Goal: Absence of physical injury  Description: Absence of physical injury  9/30/2021 1236 by Anum Fleming RN  Outcome: Ongoing  9/30/2021 0150 by Carter Cordon RN  Outcome: Ongoing     Problem: Skin Integrity:  Goal: Will show no infection signs and symptoms  Description: Will show no infection signs and symptoms  9/30/2021 1236 by Anum Fleming RN  Outcome: Ongoing  9/30/2021 0150 by Carter Cordon RN  Outcome: Ongoing  Goal: Absence of new skin breakdown  Description: Absence of new skin breakdown  9/30/2021 1236 by Anum Fleming RN  Outcome: Ongoing  9/30/2021 0150 by Carter Cordon RN  Outcome: Ongoing     Problem: Airway Clearance - Ineffective  Goal: Achieve or maintain patent airway  9/30/2021 1236 by Anum Fleming RN  Outcome: Ongoing  9/30/2021 0150 by Carter Cordon RN  Outcome: Ongoing     Problem: Gas Exchange - Impaired  Goal: Absence of hypoxia  9/30/2021 1236 by Anum Fleming RN  Outcome: Ongoing  9/30/2021 0150 by Carter Cordon RN  Outcome: Ongoing  Goal: Promote optimal lung function  9/30/2021 1236 by Anum Fleming RN  Outcome: Ongoing  9/30/2021 0150 by Carter Cordon RN  Outcome: Ongoing     Problem: Breathing Pattern - Ineffective  Goal: Ability to achieve and maintain a regular respiratory rate  9/30/2021 1236 by Anum Fleming RN  Outcome: Ongoing  9/30/2021 0150 by Carter Cordon RN  Outcome: Ongoing     Problem:  Body Temperature -  Risk of, Imbalanced  Goal: Ability to maintain a body temperature within defined limits  9/30/2021 1236 by Anum Fleming RN  Outcome: Ongoing  9/30/2021 0150 by Carter Cordon RN  Outcome: Ongoing  Goal: Will regain or maintain usual level of consciousness  9/30/2021 1236 by Anum Fleming RN  Outcome: Ongoing  9/30/2021 0150 by Wayne Clifton RN  Outcome: Ongoing  Goal: Complications related to the disease process, condition or treatment will be avoided or minimized  9/30/2021 1236 by Genoveva Linder RN  Outcome: Ongoing  9/30/2021 0150 by Wayne Clifton RN  Outcome: Ongoing     Problem: Isolation Precautions - Risk of Spread of Infection  Goal: Prevent transmission of infection  9/30/2021 1236 by Genoveva Linder RN  Outcome: Ongoing  9/30/2021 0150 by Wayne Clifton RN  Outcome: Ongoing     Problem: Nutrition Deficits  Goal: Optimize nutritional status  9/30/2021 1236 by Genoveva Linder RN  Outcome: Ongoing  9/30/2021 0150 by Wayne Clifton RN  Outcome: Ongoing     Problem: Risk for Fluid Volume Deficit  Goal: Maintain normal heart rhythm  9/30/2021 1236 by Genoveva Linder RN  Outcome: Ongoing  9/30/2021 0150 by Wayne Clifton RN  Outcome: Ongoing  Goal: Maintain absence of muscle cramping  9/30/2021 1236 by Genoveva Linder RN  Outcome: Ongoing  9/30/2021 0150 by Wayne Clifton RN  Outcome: Ongoing  Goal: Maintain normal serum potassium, sodium, calcium, phosphorus, and pH  9/30/2021 1236 by Genoveva Linder RN  Outcome: Ongoing  9/30/2021 0150 by Wayne Clifton RN  Outcome: Ongoing     Problem: Loneliness or Risk for Loneliness  Goal: Demonstrate positive use of time alone when socialization is not possible  9/30/2021 1236 by Genoveva Linder RN  Outcome: Ongoing  9/30/2021 0150 by Wayne Clifton RN  Outcome: Ongoing     Problem: Fatigue  Goal: Verbalize increase energy and improved vitality  9/30/2021 1236 by Genoveva Linder RN  Outcome: Ongoing  9/30/2021 0150 by Wayne Clifton RN  Outcome: Ongoing     Problem: Patient Education: Go to Patient Education Activity  Goal: Patient/Family Education  9/30/2021 1236 by Genoveva Linder RN  Outcome: Ongoing  9/30/2021 0150 by Wayne Clifton RN  Outcome: Ongoing

## 2021-09-30 NOTE — PROGRESS NOTES
Department of Internal Medicine  Progress Note      SUBJECTIVE:  The patient denies any new complaints, Continues to be on 5 L of oxygen    Review of Systems - General ROS:denies any headache or weakness    Respiratory ROS: has shortness of breath, dyspnea on exertion, denies cough   Cardiovascular ROS: denies any chest pain, palpitations, dizziness syncope or swelling in extremities  Gastrointestinal ROS: denies any abdominal pain, acid reflux, change in bowel habits or blood in stool, dark or tarry stools       OBJECTIVE:      PHYSICAL:   VITALS:  /70   Pulse 91   Temp 96.8 °F (36 °C) (Temporal)   Resp 18   Ht 5' 4\" (1.626 m)   Wt 137 lb (62.1 kg)   SpO2 91%   BMI 23.52 kg/m²   PULSE OXIMETRY RANGE: SpO2  Av.1 %  Min: 90 %  Max: 95 %  No intake or output data in the 24 hours ending 21 1441   CONSTITUTIONAL:  awake, alert, cooperative, no apparent distress, and appears stated age  NECK:  Supple, symmetrical, trachea midline, no adenopathy, thyroid symmetric, not enlarged and no tenderness, skin normal  LUNGS:  no increased work of breathing, decreased air exchange and crackles right base and left base  CARDIOVASCULAR:  regular rate and rhythm  ABDOMEN:  No scars, normal bowel sounds, soft, non-distended, non-tender, no masses palpated, no hepatosplenomegally  NEUROLOGIC:  Awake, alert, oriented to name, place and time. Cranial nerves II-XII are grossly intact. Motor is 5 out of 5 bilaterally. Cerebellar finger to nose, heel to shin intact. Sensory is intact.   Babinski down going, Romberg negative, and gait is normal.  EDEMA: Normal    Data      CBC:   Lab Results   Component Value Date    WBC 3.3 2021    RBC 4.23 2021    HGB 13.3 2021    HCT 39.4 2021    MCV 93.0 2021    MCH 31.5 2021    MCHC 33.9 2021    RDW 14.4 2021     2021    MPV 8.7 2021     CMP:    Lab Results   Component Value Date     2021    K 3.9 09/29/2021     09/29/2021    CO2 24 09/29/2021    BUN 26 09/29/2021    CREATININE 1.1 09/29/2021    GFRAA >60 09/29/2021    AGRATIO 0.9 09/28/2021    LABGLOM >60 09/29/2021    GLUCOSE 219 09/29/2021    PROT 6.9 09/28/2021    LABALBU 3.3 09/28/2021    CALCIUM 8.2 09/29/2021    BILITOT 0.3 09/28/2021    ALKPHOS 60 09/28/2021    AST 43 09/28/2021    ALT 20 09/28/2021       ASSESSMENT      Patient Active Problem List   Diagnosis    Pneumonia due to COVID-19 virus    Type 2 diabetes mellitus without complication (HCC)    COPD (chronic obstructive pulmonary disease) ([facility] [place] [address]    Acquired hypothyroidism    Acute respiratory failure with hypoxia ([facility] [place] [address]         PLAN  1. Continue Remdisivir day 3  2. Receiving second dose of actemra  3. Continue Decadron  4. Continue Lovenox at therapeutic dose  5. Discontinue IV fluids  6.  Recheck labs in the morning

## 2021-09-30 NOTE — PLAN OF CARE
Problem: Falls - Risk of:  Goal: Will remain free from falls  Description: Will remain free from falls  Outcome: Ongoing  Goal: Absence of physical injury  Description: Absence of physical injury  Outcome: Ongoing     Problem: Skin Integrity:  Goal: Will show no infection signs and symptoms  Description: Will show no infection signs and symptoms  Outcome: Ongoing  Goal: Absence of new skin breakdown  Description: Absence of new skin breakdown  Outcome: Ongoing     Problem: Airway Clearance - Ineffective  Goal: Achieve or maintain patent airway  Outcome: Ongoing     Problem: Gas Exchange - Impaired  Goal: Absence of hypoxia  Outcome: Ongoing  Goal: Promote optimal lung function  Outcome: Ongoing     Problem: Breathing Pattern - Ineffective  Goal: Ability to achieve and maintain a regular respiratory rate  Outcome: Ongoing     Problem:  Body Temperature -  Risk of, Imbalanced  Goal: Ability to maintain a body temperature within defined limits  Outcome: Ongoing  Goal: Will regain or maintain usual level of consciousness  Outcome: Ongoing  Goal: Complications related to the disease process, condition or treatment will be avoided or minimized  Outcome: Ongoing     Problem: Isolation Precautions - Risk of Spread of Infection  Goal: Prevent transmission of infection  Outcome: Ongoing     Problem: Nutrition Deficits  Goal: Optimize nutritional status  Outcome: Ongoing     Problem: Risk for Fluid Volume Deficit  Goal: Maintain normal heart rhythm  Outcome: Ongoing  Goal: Maintain absence of muscle cramping  Outcome: Ongoing  Goal: Maintain normal serum potassium, sodium, calcium, phosphorus, and pH  Outcome: Ongoing     Problem: Loneliness or Risk for Loneliness  Goal: Demonstrate positive use of time alone when socialization is not possible  Outcome: Ongoing     Problem: Fatigue  Goal: Verbalize increase energy and improved vitality  Outcome: Ongoing     Problem: Patient Education: Go to Patient Education Activity  Goal: Patient/Family Education  Outcome: Ongoing

## 2021-09-30 NOTE — PROGRESS NOTES
VSS. Head to toe assessment complete. meds given per [facility]. Patient denies any further needs at this time.

## 2021-10-01 ENCOUNTER — APPOINTMENT (OUTPATIENT)
Dept: GENERAL RADIOLOGY | Age: 86
DRG: 177 | End: 2021-10-01
Payer: MEDICARE

## 2021-10-01 LAB
ANION GAP SERPL CALCULATED.3IONS-SCNC: 11 MMOL/L (ref 3–16)
BASOPHILS ABSOLUTE: 0 K/UL (ref 0–0.2)
BASOPHILS RELATIVE PERCENT: 0.2 %
BUN BLDV-MCNC: 32 MG/DL (ref 7–20)
C-REACTIVE PROTEIN: 23.4 MG/L (ref 0–5.1)
CALCIUM SERPL-MCNC: 7.9 MG/DL (ref 8.3–10.6)
CHLORIDE BLD-SCNC: 106 MMOL/L (ref 99–110)
CO2: 20 MMOL/L (ref 21–32)
CREAT SERPL-MCNC: 0.8 MG/DL (ref 0.8–1.3)
D DIMER: 789 NG/ML DDU (ref 0–229)
EOSINOPHILS ABSOLUTE: 0 K/UL (ref 0–0.6)
EOSINOPHILS RELATIVE PERCENT: 0 %
GFR AFRICAN AMERICAN: >60
GFR NON-AFRICAN AMERICAN: >60
GLUCOSE BLD-MCNC: 177 MG/DL (ref 70–99)
GLUCOSE BLD-MCNC: 204 MG/DL (ref 70–99)
GLUCOSE BLD-MCNC: 205 MG/DL (ref 70–99)
GLUCOSE BLD-MCNC: 235 MG/DL (ref 70–99)
GLUCOSE BLD-MCNC: 255 MG/DL (ref 70–99)
HCT VFR BLD CALC: 37 % (ref 40.5–52.5)
HEMOGLOBIN: 12.4 G/DL (ref 13.5–17.5)
LYMPHOCYTES ABSOLUTE: 0.5 K/UL (ref 1–5.1)
LYMPHOCYTES RELATIVE PERCENT: 7 %
MCH RBC QN AUTO: 31.4 PG (ref 26–34)
MCHC RBC AUTO-ENTMCNC: 33.6 G/DL (ref 31–36)
MCV RBC AUTO: 93.3 FL (ref 80–100)
MONOCYTES ABSOLUTE: 0.7 K/UL (ref 0–1.3)
MONOCYTES RELATIVE PERCENT: 10.1 %
NEUTROPHILS ABSOLUTE: 5.5 K/UL (ref 1.7–7.7)
NEUTROPHILS RELATIVE PERCENT: 82.7 %
PDW BLD-RTO: 14.2 % (ref 12.4–15.4)
PERFORMED ON: ABNORMAL
PLATELET # BLD: 188 K/UL (ref 135–450)
PMV BLD AUTO: 8.1 FL (ref 5–10.5)
POTASSIUM SERPL-SCNC: 4.1 MMOL/L (ref 3.5–5.1)
RBC # BLD: 3.96 M/UL (ref 4.2–5.9)
SODIUM BLD-SCNC: 137 MMOL/L (ref 136–145)
WBC # BLD: 6.6 K/UL (ref 4–11)

## 2021-10-01 PROCEDURE — 80048 BASIC METABOLIC PNL TOTAL CA: CPT

## 2021-10-01 PROCEDURE — 86140 C-REACTIVE PROTEIN: CPT

## 2021-10-01 PROCEDURE — 2500000003 HC RX 250 WO HCPCS: Performed by: INTERNAL MEDICINE

## 2021-10-01 PROCEDURE — 2580000003 HC RX 258: Performed by: INTERNAL MEDICINE

## 2021-10-01 PROCEDURE — 94640 AIRWAY INHALATION TREATMENT: CPT

## 2021-10-01 PROCEDURE — 6370000000 HC RX 637 (ALT 250 FOR IP): Performed by: INTERNAL MEDICINE

## 2021-10-01 PROCEDURE — 85025 COMPLETE CBC W/AUTO DIFF WBC: CPT

## 2021-10-01 PROCEDURE — 85379 FIBRIN DEGRADATION QUANT: CPT

## 2021-10-01 PROCEDURE — 2060000000 HC ICU INTERMEDIATE R&B

## 2021-10-01 PROCEDURE — 94761 N-INVAS EAR/PLS OXIMETRY MLT: CPT

## 2021-10-01 PROCEDURE — 2700000000 HC OXYGEN THERAPY PER DAY

## 2021-10-01 PROCEDURE — 71045 X-RAY EXAM CHEST 1 VIEW: CPT

## 2021-10-01 PROCEDURE — 99223 1ST HOSP IP/OBS HIGH 75: CPT | Performed by: INTERNAL MEDICINE

## 2021-10-01 PROCEDURE — 94680 O2 UPTK RST&XERS DIR SIMPLE: CPT

## 2021-10-01 PROCEDURE — 6360000002 HC RX W HCPCS: Performed by: NURSE PRACTITIONER

## 2021-10-01 PROCEDURE — 6360000002 HC RX W HCPCS: Performed by: INTERNAL MEDICINE

## 2021-10-01 PROCEDURE — 36415 COLL VENOUS BLD VENIPUNCTURE: CPT

## 2021-10-01 RX ORDER — DEXAMETHASONE 4 MG/1
20 TABLET ORAL DAILY
Status: DISCONTINUED | OUTPATIENT
Start: 2021-10-02 | End: 2021-10-01

## 2021-10-01 RX ADMIN — INSULIN LISPRO 4 UNITS: 100 INJECTION, SOLUTION INTRAVENOUS; SUBCUTANEOUS at 13:26

## 2021-10-01 RX ADMIN — TIMOLOL MALEATE 1 DROP: 5 SOLUTION OPHTHALMIC at 08:21

## 2021-10-01 RX ADMIN — Medication 10 ML: at 20:14

## 2021-10-01 RX ADMIN — Medication 2000 UNITS: at 08:13

## 2021-10-01 RX ADMIN — THERA TABS 1 TABLET: TAB at 08:14

## 2021-10-01 RX ADMIN — Medication 10 ML: at 08:21

## 2021-10-01 RX ADMIN — INSULIN LISPRO 4 UNITS: 100 INJECTION, SOLUTION INTRAVENOUS; SUBCUTANEOUS at 08:21

## 2021-10-01 RX ADMIN — TIMOLOL MALEATE 1 DROP: 5 SOLUTION OPHTHALMIC at 20:16

## 2021-10-01 RX ADMIN — ENOXAPARIN SODIUM 60 MG: 60 INJECTION SUBCUTANEOUS at 20:14

## 2021-10-01 RX ADMIN — LEVOTHYROXINE SODIUM 75 MCG: 0.15 TABLET ORAL at 05:07

## 2021-10-01 RX ADMIN — ALOGLIPTIN 6.25 MG: 6.25 TABLET, FILM COATED ORAL at 08:14

## 2021-10-01 RX ADMIN — DEXAMETHASONE 6 MG: 4 TABLET ORAL at 08:14

## 2021-10-01 RX ADMIN — BRIMONIDINE TARTRATE 1 DROP: 2 SOLUTION OPHTHALMIC at 20:16

## 2021-10-01 RX ADMIN — BRIMONIDINE TARTRATE 1 DROP: 2 SOLUTION OPHTHALMIC at 08:21

## 2021-10-01 RX ADMIN — INSULIN LISPRO 4 UNITS: 100 INJECTION, SOLUTION INTRAVENOUS; SUBCUTANEOUS at 18:14

## 2021-10-01 RX ADMIN — Medication 1 PUFF: at 07:55

## 2021-10-01 RX ADMIN — ZINC SULFATE 220 MG (50 MG) CAPSULE 50 MG: CAPSULE at 08:14

## 2021-10-01 RX ADMIN — TIOTROPIUM BROMIDE INHALATION SPRAY 2 PUFF: 3.12 SPRAY, METERED RESPIRATORY (INHALATION) at 07:55

## 2021-10-01 RX ADMIN — INSULIN LISPRO 3 UNITS: 100 INJECTION, SOLUTION INTRAVENOUS; SUBCUTANEOUS at 20:16

## 2021-10-01 RX ADMIN — Medication 1 PUFF: at 20:20

## 2021-10-01 RX ADMIN — DEXAMETHASONE SODIUM PHOSPHATE 20 MG: 4 INJECTION, SOLUTION INTRA-ARTICULAR; INTRALESIONAL; INTRAMUSCULAR; INTRAVENOUS; SOFT TISSUE at 15:59

## 2021-10-01 RX ADMIN — REMDESIVIR 100 MG: 100 INJECTION, POWDER, LYOPHILIZED, FOR SOLUTION INTRAVENOUS at 10:22

## 2021-10-01 RX ADMIN — ENOXAPARIN SODIUM 60 MG: 60 INJECTION SUBCUTANEOUS at 08:13

## 2021-10-01 RX ADMIN — LATANOPROST 1 DROP: 50 SOLUTION OPHTHALMIC at 08:21

## 2021-10-01 ASSESSMENT — PAIN SCALES - GENERAL
PAINLEVEL_OUTOF10: 0

## 2021-10-01 NOTE — PROGRESS NOTES
10/01/2021    MPV 8.1 10/01/2021     BMP:    Lab Results   Component Value Date     10/01/2021    K 4.1 10/01/2021    K 3.9 09/29/2021     10/01/2021    CO2 20 10/01/2021    BUN 32 10/01/2021    LABALBU 3.3 09/28/2021    CREATININE 0.8 10/01/2021    CALCIUM 7.9 10/01/2021    GFRAA >60 10/01/2021    LABGLOM >60 10/01/2021    GLUCOSE 177 10/01/2021       ASSESSMENT      Patient Active Problem List   Diagnosis    Pneumonia due to COVID-19 virus    Type 2 diabetes mellitus without complication (HCC)    [facility]ch[facility] obstructive [facility] disease) [facility] [address]    Acquired hypothyroidism    Acute respiratory failure with hypoxia (HCC)         PLAN  1. Continue remdisivir  2. Decadron increased by pulmonology  3. Received one dose of actemra  4. Continue Lovenox at therapeutic dose  5. Continue weaning option, most probably he lives in the low 90s  6.  Recheck labs in the morning

## 2021-10-01 NOTE — PROGRESS NOTES
Pt denies any pain VSS remains 14L HF sat 94% pt up to chair standby assist call light in reach.  Bilateral hearing aids in place

## 2021-10-01 NOTE — CONSULTS
PULMONARY AND CRITICAL CARE MEDICINE CONSULTATION NOTE    CONSULTING PHYSICIAN:  Merrill Montano MD    ADMISSION DATE: 2021  ADMISSION DIAGNOSIS: Hypoxia [R09.02]  Pneumonia due to COVID-19 virus [U07.1, J12.82]  COVID-19 [U07.1]    REASON FOR CONSULT:   Chief Complaint   Patient presents with    Shortness of Breath     sent per MD for SOB, weakness to legs, fever, coughing; rapid covid +       DATE OF CONSULT: 2021    HISTORY OF PRESENT ILLNESS: 82y.o. year old male with past medical history of COPD, diabetes, hypothyroidism  presented to the hospital with worsening shortness of breath and intermittent cough. Patient is unvaccinated against COVID-19 and went to a  where he was not wearing a mask. Subsequently developed generalized malaise associated with shortness of breath and cough. Cough has been mostly dry occasionally productive of whitish expectoration. Found to be hypoxic in the ER and started on oxygen supplementation at 3 to 4 L/min. In the last 24 hours his oxygen requirements have increased to 14 L/min. Chest imaging did show predominantly right-sided atypical pneumonitis. At the time of interview patient sitting in bed in no apparent respiratory distress. Reports that his breathing is stable. Denies any chest pain, chest tightness. Currently on 14 L/min of oxygen supplementation via high flow nasal cannula saturating 97% on the monitor. REVIEW OF SYSTEMS:     CONSTITUTIONAL SYMPTOMS: The patient denies fever, fatigue, night sweats, weight loss or weight gain. HEENT: No vision changes. No tinnitus, Denies sinus pain. No hoarseness, or dysphagia. NECK: Patient denies swelling in the neck. CARDIOVASCULAR: Denies chest pain, palpitation, syncope. RESPIRATORY: As per HPI. GASTROINTESTINAL: Denies nausea, abdominal pain or change in bowel function. GENITOURINARY: Denies obstructive symptoms. No history of incontinence.   BREASTS: No masses or lumps in the breasts. SKIN: No rashes or itching. MUSCULOSKELETAL: Denies weakness or bone pain. NEUROLOGICAL: No headaches or seizures. PSYCHIATRIC: Denies mood swings or depression. ENDOCRINE: Denies heat or cold intolerance or excessive thirst.  HEMATOLOGIC/LYMPHATIC: Denies easy bruising or lymph node swelling. ALLERGIC/IMMUNOLOGIC: No environmental allergies. PAST MEDICAL HISTORY: History reviewed. No pertinent past medical history. PAST SURGICAL HISTORY:   Past Surgical History:   Procedure Laterality Date    CARPAL TUNNEL RELEASE Bilateral     CYSTOSCOPY      LUMBAR SPINE SURGERY         SOCIAL HISTORY:   Social History     Tobacco Use    Smoking status: Former Smoker    Smokeless tobacco: Former User   Substance Use Topics    Alcohol use: Not Currently    Drug use: Not on file       FAMILY HISTORY:   Family History   Family history unknown: Yes       MEDICATIONS:     No current facility-administered medications on file prior to encounter.      Current Outpatient Medications on File Prior to Encounter   Medication Sig Dispense Refill    fluticasone-umeclidin-vilant (TRELEGY ELLIPTA) 100-62.5-25 MCG/INH AEPB Inhale 1 puff into the lungs daily      metFORMIN (GLUCOPHAGE-XR) 500 MG extended release tablet Take 500 mg by mouth 3 times daily      levothyroxine (SYNTHROID) 75 MCG tablet Take 75 mcg by mouth Daily      JANUVIA 50 MG tablet Take 50 mg by mouth daily       COMBIGAN 0.2-0.5 % ophthalmic solution Place 1 drop into both eyes every 12 hours       latanoprost (XALATAN) 0.005 % ophthalmic solution Place 1 drop into both eyes daily       Multiple Vitamin (MULTIVITAMIN) tablet Take 1 tablet by mouth daily      triamcinolone (KENALOG) 0.1 % ointment           [START ON 10/2/2021] dexamethasone  20 mg Oral Daily    tocilizumab (ACTEMRA) IVPB  8 mg/kg IntraVENous Once    insulin lispro  0-12 Units SubCUTAneous TID WC    insulin lispro  0-6 Units SubCUTAneous Nightly    enoxaparin  1 mg/kg SubCUTAneous BID    brimonidine  1 drop Both Eyes BID    budesonide-formoterol  1 puff Inhalation BID    alogliptin  6.25 mg Oral Daily    latanoprost  1 drop Both Eyes Daily    levothyroxine  75 mcg Oral QAM AC    multivitamin  1 tablet Oral Daily    sodium chloride flush  5-40 mL IntraVENous 2 times per day    Vitamin D  2,000 Units Oral Daily    zinc sulfate  50 mg Oral Daily    timolol  1 drop Both Eyes BID    tiotropium  2 puff Inhalation Daily    remdesivir IVPB  100 mg IntraVENous Q24H      dextrose      sodium chloride      dextrose       glucose, dextrose, glucagon (rDNA), dextrose, sodium chloride flush, sodium chloride, ondansetron **OR** ondansetron, polyethylene glycol, acetaminophen **OR** acetaminophen, glucose, dextrose, glucagon (rDNA), dextrose      ALLERGIES:   Allergies as of 09/28/2021 - Fully Reviewed 09/28/2021   Allergen Reaction Noted    Naproxen Rash 11/14/2013      OBJECTIVE:   height is 5' 4\" (1.626 m) and weight is 137 lb (62.1 kg). His oral temperature is 97.3 °F (36.3 °C). His blood pressure is 162/84 (abnormal) and his pulse is 100. His respiration is 18 and oxygen saturation is 94%. I/O this shift: In: 18 [P.O.:480]  Out: -      PHYSICAL EXAM:    CONSTITUTIONAL: He is a 82y.o.-year-old who appears his stated age. He is alert and oriented x 3 and in no acute distress. HEENT: PERRLA, EOMI. No scleral icterus. No thrush, atraumatic, normocephalic. NECK: Supple, without cervical or supraclavicular lymphadenopathy:  CARDIOVASCULAR: S1 S2 RRR. Without murmer  RESPIRATORY & CHEST: Bilateral scattered crackles right more than left heard. No wheezing heard. GASTROINTESTINAL & ABDOMEN: Soft, nontender, positive bowel sounds in all quadrants, non-distended, without hepatosplenomegaly. GENITOURINARY: Deferred. MUSCULOSKELETAL: No tenderness to palpation of the axial skeleton. There is no clubbing. No cyanosis. No edema of the lower extremities.    SKIN OF BODY: No rash or jaundice. PSYCHIATRIC EVALUATION: Normal affect. Patient answers questions appropriately. HEMATOLOGIC/LYMPHATIC/ IMMUNOLOGIC: No palpable lymphadenopathy. NEUROLOGIC: Alert and oriented x 3. Groslly non-focal. Motor strength is 5+/5 in all muscle groups. The patient has a normal sensorium globally. LABS:  Recent Labs     09/28/21  1543 09/29/21  0743 10/01/21  0612   WBC 5.4 3.3* 6.6   HGB 12.4* 13.3* 12.4*   HCT 36.8* 39.4* 37.0*   * 128* 188   ALT 20  --   --    AST 43*  --   --    * 136 137   K 4.4 3.9 4.1   CL 97* 101 106   CREATININE 1.4* 1.1 0.8   BUN 26* 26* 32*   CO2 21 24 20*       Recent Labs     09/28/21  1543 09/29/21  0743 10/01/21  0612   GLUCOSE 175* 219* 177*   CALCIUM 8.0* 8.2* 7.9*   * 136 137   K 4.4 3.9 4.1   CO2 21 24 20*   CL 97* 101 106   BUN 26* 26* 32*   CREATININE 1.4* 1.1 0.8       No results for input(s): PHART, IJS1HBN, PO2ART, QHK5QWI, P7JIMYKD, BEART, H3QBTVSS in the last 72 hours. No results found for: INR, PROTIME  No results found for: AMYLASE   Lab Results   Component Value Date    LABA1C 6.8 09/23/2021     Lab Results   Component Value Date    .5 09/23/2021     Lab Results   Component Value Date    TSH 0.50 09/23/2021     Lab Results   Component Value Date    TROPONINI <0.01 09/28/2021      Lab Results   Component Value Date    CRP 23.4 (H) 10/01/2021      No results found for: BNP   Lab Results   Component Value Date    DDIMER 789 (H) 10/01/2021      Lab Results   Component Value Date    FERRITIN 56.5 09/17/2020      No results found for: LACTA        IMAGING:    Chest x-ray portable 1 view done on 10/1/2021 was personally viewed by me and my interpretation is: He is infiltrates seen in bilateral lung fields right more than left. The right-sided lung infiltrates have increased. Cardiac silhouette is within normal limits. No pleural effusion, pneumothorax or pneumomediastinum seen. IMPRESSION:     1.  Acute hypoxic respiratory failure, worsening  2. COVID-19 pneumonia  3. Hypercoagulable state  4. COPD of unknown severity not in exacerbation      RECOMMENDATION:     1. Patient is suffering with COVID-19 pneumonia and related hypoxic respiratory failure. 2. Even though his inflammatory markers are downtrending, chest imaging shows slight worsening. 3. Currently on 14 L minute of oxygen supplementation saturating 97%. I decreased the oxygen flow to 10 L/min. Titrate it down to maintain SPO2 above 89%. 4. I will increase the Decadron dosage to 20 mg IV daily. 5. Continue with IV Remdesivir. 6. He has already received 1 dose of IV Actemra on 9/29/2021.  7. On therapeutic Lovenox for hypercoagulable state. 8. Continue with Symbicort and albuterol inhaler as before. 9. Courage incentive spirometry. 10. Patient should sleep in prone/semiprone position. Thank you for your consultation. We will continue to follow the patient. Shea Gamez MD  Pulmonary Critical Care and Sleep Medicine  9/28/2021, 11:01 AM    This note was completed using dragon medical speech recognition software. Grammatical errors, random word insertions, pronoun errors and incomplete sentences are occasional consequences of this technology due to software limitations. If there are questions or concerns about the content of this note of information contained within the body of this dictation they should be addressed with the provider for clarification.

## 2021-10-02 LAB
ANION GAP SERPL CALCULATED.3IONS-SCNC: 11 MMOL/L (ref 3–16)
BASOPHILS ABSOLUTE: 0 K/UL (ref 0–0.2)
BASOPHILS RELATIVE PERCENT: 0.3 %
BUN BLDV-MCNC: 32 MG/DL (ref 7–20)
CALCIUM SERPL-MCNC: 8 MG/DL (ref 8.3–10.6)
CHLORIDE BLD-SCNC: 101 MMOL/L (ref 99–110)
CO2: 22 MMOL/L (ref 21–32)
CREAT SERPL-MCNC: 1.1 MG/DL (ref 0.8–1.3)
EOSINOPHILS ABSOLUTE: 0 K/UL (ref 0–0.6)
EOSINOPHILS RELATIVE PERCENT: 0 %
GFR AFRICAN AMERICAN: >60
GFR NON-AFRICAN AMERICAN: >60
GLUCOSE BLD-MCNC: 130 MG/DL (ref 70–99)
GLUCOSE BLD-MCNC: 214 MG/DL (ref 70–99)
GLUCOSE BLD-MCNC: 249 MG/DL (ref 70–99)
GLUCOSE BLD-MCNC: 265 MG/DL (ref 70–99)
GLUCOSE BLD-MCNC: 287 MG/DL (ref 70–99)
GLUCOSE BLD-MCNC: 320 MG/DL (ref 70–99)
HCT VFR BLD CALC: 36.3 % (ref 40.5–52.5)
HEMOGLOBIN: 12.3 G/DL (ref 13.5–17.5)
LYMPHOCYTES ABSOLUTE: 0.3 K/UL (ref 1–5.1)
LYMPHOCYTES RELATIVE PERCENT: 4.3 %
MCH RBC QN AUTO: 31.5 PG (ref 26–34)
MCHC RBC AUTO-ENTMCNC: 33.8 G/DL (ref 31–36)
MCV RBC AUTO: 93.1 FL (ref 80–100)
MONOCYTES ABSOLUTE: 0.5 K/UL (ref 0–1.3)
MONOCYTES RELATIVE PERCENT: 6.2 %
NEUTROPHILS ABSOLUTE: 6.8 K/UL (ref 1.7–7.7)
NEUTROPHILS RELATIVE PERCENT: 89.2 %
PDW BLD-RTO: 14.3 % (ref 12.4–15.4)
PERFORMED ON: ABNORMAL
PLATELET # BLD: 197 K/UL (ref 135–450)
PMV BLD AUTO: 8.3 FL (ref 5–10.5)
POTASSIUM SERPL-SCNC: 4.3 MMOL/L (ref 3.5–5.1)
RBC # BLD: 3.89 M/UL (ref 4.2–5.9)
SODIUM BLD-SCNC: 134 MMOL/L (ref 136–145)
WBC # BLD: 7.7 K/UL (ref 4–11)

## 2021-10-02 PROCEDURE — 85025 COMPLETE CBC W/AUTO DIFF WBC: CPT

## 2021-10-02 PROCEDURE — 99233 SBSQ HOSP IP/OBS HIGH 50: CPT | Performed by: INTERNAL MEDICINE

## 2021-10-02 PROCEDURE — 2580000003 HC RX 258: Performed by: INTERNAL MEDICINE

## 2021-10-02 PROCEDURE — 6370000000 HC RX 637 (ALT 250 FOR IP): Performed by: INTERNAL MEDICINE

## 2021-10-02 PROCEDURE — 80048 BASIC METABOLIC PNL TOTAL CA: CPT

## 2021-10-02 PROCEDURE — 2500000003 HC RX 250 WO HCPCS: Performed by: INTERNAL MEDICINE

## 2021-10-02 PROCEDURE — 94640 AIRWAY INHALATION TREATMENT: CPT

## 2021-10-02 PROCEDURE — 36415 COLL VENOUS BLD VENIPUNCTURE: CPT

## 2021-10-02 PROCEDURE — 94761 N-INVAS EAR/PLS OXIMETRY MLT: CPT

## 2021-10-02 PROCEDURE — 2060000000 HC ICU INTERMEDIATE R&B

## 2021-10-02 PROCEDURE — 6360000002 HC RX W HCPCS: Performed by: INTERNAL MEDICINE

## 2021-10-02 PROCEDURE — 6360000002 HC RX W HCPCS: Performed by: NURSE PRACTITIONER

## 2021-10-02 PROCEDURE — 2700000000 HC OXYGEN THERAPY PER DAY

## 2021-10-02 RX ORDER — INSULIN LISPRO 100 [IU]/ML
0-9 INJECTION, SOLUTION INTRAVENOUS; SUBCUTANEOUS NIGHTLY
Status: DISCONTINUED | OUTPATIENT
Start: 2021-10-02 | End: 2021-10-04 | Stop reason: HOSPADM

## 2021-10-02 RX ORDER — INSULIN LISPRO 100 [IU]/ML
0-18 INJECTION, SOLUTION INTRAVENOUS; SUBCUTANEOUS
Status: DISCONTINUED | OUTPATIENT
Start: 2021-10-02 | End: 2021-10-04 | Stop reason: HOSPADM

## 2021-10-02 RX ADMIN — Medication 10 ML: at 21:16

## 2021-10-02 RX ADMIN — INSULIN LISPRO 12 UNITS: 100 INJECTION, SOLUTION INTRAVENOUS; SUBCUTANEOUS at 12:20

## 2021-10-02 RX ADMIN — Medication 10 ML: at 08:49

## 2021-10-02 RX ADMIN — INSULIN LISPRO 5 UNITS: 100 INJECTION, SOLUTION INTRAVENOUS; SUBCUTANEOUS at 21:18

## 2021-10-02 RX ADMIN — BRIMONIDINE TARTRATE 1 DROP: 2 SOLUTION OPHTHALMIC at 21:18

## 2021-10-02 RX ADMIN — ENOXAPARIN SODIUM 60 MG: 60 INJECTION SUBCUTANEOUS at 08:52

## 2021-10-02 RX ADMIN — ZINC SULFATE 220 MG (50 MG) CAPSULE 50 MG: CAPSULE at 08:48

## 2021-10-02 RX ADMIN — TIMOLOL MALEATE 1 DROP: 5 SOLUTION OPHTHALMIC at 21:17

## 2021-10-02 RX ADMIN — REMDESIVIR 100 MG: 100 INJECTION, POWDER, LYOPHILIZED, FOR SOLUTION INTRAVENOUS at 10:10

## 2021-10-02 RX ADMIN — TIOTROPIUM BROMIDE INHALATION SPRAY 2 PUFF: 3.12 SPRAY, METERED RESPIRATORY (INHALATION) at 09:33

## 2021-10-02 RX ADMIN — TIMOLOL MALEATE 1 DROP: 5 SOLUTION OPHTHALMIC at 08:48

## 2021-10-02 RX ADMIN — THERA TABS 1 TABLET: TAB at 08:48

## 2021-10-02 RX ADMIN — LATANOPROST 1 DROP: 50 SOLUTION OPHTHALMIC at 08:48

## 2021-10-02 RX ADMIN — LEVOTHYROXINE SODIUM 75 MCG: 0.15 TABLET ORAL at 05:04

## 2021-10-02 RX ADMIN — Medication 1 PUFF: at 20:21

## 2021-10-02 RX ADMIN — ENOXAPARIN SODIUM 60 MG: 60 INJECTION SUBCUTANEOUS at 21:16

## 2021-10-02 RX ADMIN — ALOGLIPTIN 6.25 MG: 6.25 TABLET, FILM COATED ORAL at 08:48

## 2021-10-02 RX ADMIN — DEXAMETHASONE SODIUM PHOSPHATE 20 MG: 4 INJECTION, SOLUTION INTRA-ARTICULAR; INTRALESIONAL; INTRAMUSCULAR; INTRAVENOUS; SOFT TISSUE at 08:51

## 2021-10-02 RX ADMIN — Medication 1 PUFF: at 09:33

## 2021-10-02 RX ADMIN — Medication 2000 UNITS: at 08:48

## 2021-10-02 RX ADMIN — INSULIN LISPRO 6 UNITS: 100 INJECTION, SOLUTION INTRAVENOUS; SUBCUTANEOUS at 08:59

## 2021-10-02 RX ADMIN — BRIMONIDINE TARTRATE 1 DROP: 2 SOLUTION OPHTHALMIC at 08:48

## 2021-10-02 ASSESSMENT — PAIN SCALES - GENERAL
PAINLEVEL_OUTOF10: 0

## 2021-10-02 NOTE — PROGRESS NOTES
Shift assessment completed. Routine vitals stable. SpO2 91% on 5L O2. Scheduled medications given. Patient is awake, alert and oriented, and sitting up in chair. Respirations are easy and unlabored. Patient does not appear to be in distress. Call light within reach. Chair alarm on. Pt expresses no further needs at this time.

## 2021-10-02 NOTE — PROGRESS NOTES
Department of Internal Medicine  Progress Note      SUBJECTIVE:  No new c/o, on 5 L of oxygen  Review of Systems - General ROS:denies any headache or weakness    Respiratory ROS: has SOB and cough  Cardiovascular ROS: denies any chest pain, palpitations,dizziness syncope or swelling in extremities  Gastrointestinal ROS: denies any abdominal pain, acid reflux, change in bowel habits or blood in stool, dark or tarry stools       OBJECTIVE:      PHYSICAL:   VITALS:  BP (!) 150/77   Pulse 97   Temp 97.7 °F (36.5 °C) (Temporal)   Resp 18   Ht 5' 4\" (1.626 m)   Wt 137 lb (62.1 kg)   SpO2 91%   BMI 23.52 kg/m²   PULSE OXIMETRY RANGE: SpO2  Av.1 %  Min: 84 %  Max: 98 %    Intake/Output Summary (Last 24 hours) at 10/2/2021 0806  Last data filed at 10/1/2021 1813  Gross per 24 hour   Intake 960 ml   Output --   Net 960 ml      CONSTITUTIONAL:  awake, alert, cooperative, no apparent distress, and appears stated age  NECK:  Supple, symmetrical, trachea midline, no adenopathy, thyroid symmetric, not enlarged and no tenderness, skin normal  LUNGS:  No increased work of breathing, good air exchange, clear to auscultation bilaterally, no crackles or wheezing  CARDIOVASCULAR:  regular rate and rhythm  ABDOMEN:  No scars, normal bowel sounds, soft, non-distended, non-tender, no masses palpated, no hepatosplenomegally  NEUROLOGIC:  Awake, alert, oriented to name, place and time. Cranial nerves II-XII are grossly intact. Motor is 5 out of 5 bilaterally. Cerebellar finger to nose, heel to shin intact. Sensory is intact.   Babinski down going, Romberg negative, and gait is normal.  EDEMA: Normal    Data      CBC:   Lab Results   Component Value Date    WBC 6.6 10/01/2021    RBC 3.96 10/01/2021    HGB 12.4 10/01/2021    HCT 37.0 10/01/2021    MCV 93.3 10/01/2021    MCH 31.4 10/01/2021    MCHC 33.6 10/01/2021    RDW 14.2 10/01/2021     10/01/2021    MPV 8.1 10/01/2021     CMP:    Lab Results   Component Value Date  10/01/2021    K 4.1 10/01/2021    K 3.9 09/29/2021     10/01/2021    CO2 20 10/01/2021    BUN 32 10/01/2021    CREATININE 0.8 10/01/2021    GFRAA >60 10/01/2021    AGRATIO 0.9 09/28/2021    LABGLOM >60 10/01/2021    GLUCOSE 177 10/01/2021    PROT 6.9 09/28/2021    LABALBU 3.3 09/28/2021    CALCIUM 7.9 10/01/2021    BILITOT 0.3 09/28/2021    ALKPHOS 60 09/28/2021    AST 43 09/28/2021    ALT 20 09/28/2021       ASSESSMENT      Patient Active Problem List   Diagnosis    Pneumonia due to COVID-19 virus    Type 2 diabetes mellitus without complication (HCC)    [facility]) ([facility] [address]    Acquired hypothyroidism    Acute respiratory failure with hypoxia ([facility] [address]         PLAN  1. Continue remdisivir. lovenox and decadron  2. Improving slowly   3.  Continue weaning oxygen

## 2021-10-02 NOTE — PROGRESS NOTES
PULMONARY AND CRITICAL CARE MEDICINE PROGRESS NOTE      SUBJECTIVE: Patient has been weaned down to 5 L nasal cannula. Saturating well. REVIEW OF SYSTEMS:   CONSTITUTIONAL SYMPTOMS: The patient denies fever, fatigue, night sweats, weight loss or weight gain. HEENT: No vision changes. No tinnitus, Denies sinus pain. No hoarseness, or dysphagia. CARDIOVASCULAR: Denies chest pain, palpitation, syncope. RESPIRATORY: Denies shortness of breath or cough. GASTROINTESTINAL: Denies nausea, abdominal pain or change in bowel function. SKIN: No rashes or itching. MUSCULOSKELETAL: Denies weakness or bone pain. NEUROLOGICAL: No headaches or seizures. MEDICATIONS:      insulin lispro  0-18 Units SubCUTAneous TID WC    insulin lispro  0-9 Units SubCUTAneous Nightly    dexamethasone  20 mg IntraVENous Daily    enoxaparin  1 mg/kg SubCUTAneous BID    brimonidine  1 drop Both Eyes BID    budesonide-formoterol  1 puff Inhalation BID    alogliptin  6.25 mg Oral Daily    latanoprost  1 drop Both Eyes Daily    levothyroxine  75 mcg Oral QAM AC    multivitamin  1 tablet Oral Daily    sodium chloride flush  5-40 mL IntraVENous 2 times per day    Vitamin D  2,000 Units Oral Daily    zinc sulfate  50 mg Oral Daily    timolol  1 drop Both Eyes BID    tiotropium  2 puff Inhalation Daily      dextrose      sodium chloride      dextrose       glucose, dextrose, glucagon (rDNA), dextrose, sodium chloride flush, sodium chloride, ondansetron **OR** ondansetron, polyethylene glycol, acetaminophen **OR** acetaminophen, glucose, dextrose, glucagon (rDNA), dextrose     ALLERGIES:   Allergies as of 09/28/2021 - Fully Reviewed 09/28/2021   Allergen Reaction Noted    Naproxen Rash 11/14/2013        OBJECTIVE:   height is 5' 4\" (1.626 m) and weight is 137 lb (62.1 kg). His temporal temperature is 97.7 °F (36.5 °C). His blood pressure is 150/77 (abnormal) and his pulse is 97.  His respiration is 16 and oxygen saturation is 92%. No intake/output data recorded. PHYSICAL EXAM:    CONSTITUTIONAL: He is a 82y.o.-year-old who appears his stated age. He is alert and oriented x 3 and in no acute distress. CARDIOVASCULAR: S1 S2 RRR. Without murmer  RESPIRATORY & CHEST: Lungs are clear to auscultation and percussion. No wheezing, no crackles. Good air movement  GASTROINTESTINAL & ABDOMEN: Soft, nontender, positive bowel sounds in all quadrants, non-distended, without hepatosplenomegaly. GENITOURINARY: Deferred. MUSCULOSKELETAL: No tenderness to palpation of the axial skeleton. There is no clubbing. No cyanosis. No edema of the lower extremities. SKIN OF BODY: No rash or jaundice. PSYCHIATRIC EVALUATION: Normal affect. Patient answers questions appropriately. HEMATOLOGIC/LYMPHATIC/ IMMUNOLOGIC: No palpable lymphadenopathy. NEUROLOGIC: Alert and oriented x 3. Groslly non-focal. Motor strength is 5+/5 in all muscle groups. The patient has a normal sensorium globally. LABS:   Lab Results   Component Value Date    WBC 7.7 10/02/2021    HGB 12.3 (L) 10/02/2021    HCT 36.3 (L) 10/02/2021     10/02/2021    CHOL 147 09/23/2021    TRIG 108 09/23/2021    HDL 49 09/23/2021    ALT 20 09/28/2021    AST 43 (H) 09/28/2021     (L) 10/02/2021    K 4.3 10/02/2021     10/02/2021    CREATININE 1.1 10/02/2021    BUN 32 (H) 10/02/2021    CO2 22 10/02/2021    TSH 0.50 09/23/2021       Lab Results   Component Value Date    GLUCOSE 287 (H) 10/02/2021    CALCIUM 8.0 (L) 10/02/2021     (L) 10/02/2021    K 4.3 10/02/2021    CO2 22 10/02/2021     10/02/2021    BUN 32 (H) 10/02/2021    CREATININE 1.1 10/02/2021       Lab Results   Component Value Date    GLUCOSE 287 (H) 10/02/2021    CALCIUM 8.0 (L) 10/02/2021     (L) 10/02/2021    K 4.3 10/02/2021    CO2 22 10/02/2021     10/02/2021    BUN 32 (H) 10/02/2021    CREATININE 1.1 10/02/2021       IMPRESSION:      1.  Acute hypoxic respiratory failure, worsening  2. COVID-19 pneumonia  3. Hypercoagulable state  4. COPD of unknown severity not in exacerbation        RECOMMENDATION:      1. Patient is suffering with COVID-19 pneumonia and related hypoxic respiratory failure. 2. Even though his inflammatory markers are downtrending, chest imaging shows slight worsening. 3. Patient has been weaned down to 5 L nasal cannula. Titrate it down to maintain SPO2 above 89%. 4. Continue Decadron dosage to 20 mg IV daily. This can be slowly weaned down for the next few days. 5. Continue with IV Remdesivir. 6. He has already received 1 dose of IV Actemra on 9/29/2021.  7. On therapeutic Lovenox for hypercoagulable state. 8. Continue with Symbicort and albuterol inhaler as before. 9. Courage incentive spirometry. 10. Patient should sleep in prone/semiprone position. Pulmonary will sign off. Please call back for any further questions or concerns.         Kayla Tovar MD  Pulmonary Critical Care and Sleep Medicine  10/2/2021, 3:46 PM    This note was completed using dragon medical speech recognition software. Grammatical errors, random word insertions, pronoun errors and incomplete sentences are occasional consequences of this technology due to software limitations. If there are questions or concerns about the content of this note of information contained within the body of this dictation they should be addressed with the provider for clarification.

## 2021-10-03 LAB
ANION GAP SERPL CALCULATED.3IONS-SCNC: 9 MMOL/L (ref 3–16)
BASOPHILS ABSOLUTE: 0 K/UL (ref 0–0.2)
BASOPHILS RELATIVE PERCENT: 0.1 %
BUN BLDV-MCNC: 33 MG/DL (ref 7–20)
CALCIUM SERPL-MCNC: 8.2 MG/DL (ref 8.3–10.6)
CHLORIDE BLD-SCNC: 102 MMOL/L (ref 99–110)
CO2: 23 MMOL/L (ref 21–32)
CREAT SERPL-MCNC: 1.2 MG/DL (ref 0.8–1.3)
EOSINOPHILS ABSOLUTE: 0 K/UL (ref 0–0.6)
EOSINOPHILS RELATIVE PERCENT: 0 %
GFR AFRICAN AMERICAN: >60
GFR NON-AFRICAN AMERICAN: 58
GLUCOSE BLD-MCNC: 190 MG/DL (ref 70–99)
GLUCOSE BLD-MCNC: 220 MG/DL (ref 70–99)
GLUCOSE BLD-MCNC: 253 MG/DL (ref 70–99)
GLUCOSE BLD-MCNC: 281 MG/DL (ref 70–99)
GLUCOSE BLD-MCNC: 319 MG/DL (ref 70–99)
HCT VFR BLD CALC: 35.2 % (ref 40.5–52.5)
HEMOGLOBIN: 12 G/DL (ref 13.5–17.5)
LYMPHOCYTES ABSOLUTE: 0.6 K/UL (ref 1–5.1)
LYMPHOCYTES RELATIVE PERCENT: 7.6 %
MCH RBC QN AUTO: 31.6 PG (ref 26–34)
MCHC RBC AUTO-ENTMCNC: 34 G/DL (ref 31–36)
MCV RBC AUTO: 92.7 FL (ref 80–100)
MONOCYTES ABSOLUTE: 0.8 K/UL (ref 0–1.3)
MONOCYTES RELATIVE PERCENT: 9.8 %
NEUTROPHILS ABSOLUTE: 6.4 K/UL (ref 1.7–7.7)
NEUTROPHILS RELATIVE PERCENT: 82.5 %
PDW BLD-RTO: 14.3 % (ref 12.4–15.4)
PERFORMED ON: ABNORMAL
PLATELET # BLD: 184 K/UL (ref 135–450)
PMV BLD AUTO: 8.3 FL (ref 5–10.5)
POTASSIUM SERPL-SCNC: 4.2 MMOL/L (ref 3.5–5.1)
RBC # BLD: 3.8 M/UL (ref 4.2–5.9)
SODIUM BLD-SCNC: 134 MMOL/L (ref 136–145)
WBC # BLD: 7.8 K/UL (ref 4–11)

## 2021-10-03 PROCEDURE — 6370000000 HC RX 637 (ALT 250 FOR IP): Performed by: INTERNAL MEDICINE

## 2021-10-03 PROCEDURE — 94761 N-INVAS EAR/PLS OXIMETRY MLT: CPT

## 2021-10-03 PROCEDURE — 85025 COMPLETE CBC W/AUTO DIFF WBC: CPT

## 2021-10-03 PROCEDURE — 2700000000 HC OXYGEN THERAPY PER DAY

## 2021-10-03 PROCEDURE — 6360000002 HC RX W HCPCS: Performed by: NURSE PRACTITIONER

## 2021-10-03 PROCEDURE — 94640 AIRWAY INHALATION TREATMENT: CPT

## 2021-10-03 PROCEDURE — 2060000000 HC ICU INTERMEDIATE R&B

## 2021-10-03 PROCEDURE — 6360000002 HC RX W HCPCS: Performed by: INTERNAL MEDICINE

## 2021-10-03 PROCEDURE — 80048 BASIC METABOLIC PNL TOTAL CA: CPT

## 2021-10-03 PROCEDURE — 2580000003 HC RX 258: Performed by: INTERNAL MEDICINE

## 2021-10-03 PROCEDURE — 36415 COLL VENOUS BLD VENIPUNCTURE: CPT

## 2021-10-03 RX ADMIN — DEXAMETHASONE SODIUM PHOSPHATE 20 MG: 4 INJECTION, SOLUTION INTRA-ARTICULAR; INTRALESIONAL; INTRAMUSCULAR; INTRAVENOUS; SOFT TISSUE at 10:08

## 2021-10-03 RX ADMIN — INSULIN GLARGINE 10 UNITS: 100 INJECTION, SOLUTION SUBCUTANEOUS at 14:44

## 2021-10-03 RX ADMIN — THERA TABS 1 TABLET: TAB at 07:43

## 2021-10-03 RX ADMIN — INSULIN LISPRO 3 UNITS: 100 INJECTION, SOLUTION INTRAVENOUS; SUBCUTANEOUS at 19:57

## 2021-10-03 RX ADMIN — INSULIN LISPRO 12 UNITS: 100 INJECTION, SOLUTION INTRAVENOUS; SUBCUTANEOUS at 18:14

## 2021-10-03 RX ADMIN — Medication 2000 UNITS: at 07:43

## 2021-10-03 RX ADMIN — ENOXAPARIN SODIUM 60 MG: 60 INJECTION SUBCUTANEOUS at 07:43

## 2021-10-03 RX ADMIN — ENOXAPARIN SODIUM 60 MG: 60 INJECTION SUBCUTANEOUS at 19:55

## 2021-10-03 RX ADMIN — LATANOPROST 1 DROP: 50 SOLUTION OPHTHALMIC at 19:56

## 2021-10-03 RX ADMIN — ZINC SULFATE 220 MG (50 MG) CAPSULE 50 MG: CAPSULE at 07:43

## 2021-10-03 RX ADMIN — LEVOTHYROXINE SODIUM 75 MCG: 0.15 TABLET ORAL at 05:21

## 2021-10-03 RX ADMIN — INSULIN LISPRO 9 UNITS: 100 INJECTION, SOLUTION INTRAVENOUS; SUBCUTANEOUS at 13:07

## 2021-10-03 RX ADMIN — Medication 1 PUFF: at 20:30

## 2021-10-03 RX ADMIN — LATANOPROST 1 DROP: 50 SOLUTION OPHTHALMIC at 07:45

## 2021-10-03 RX ADMIN — Medication 10 ML: at 19:55

## 2021-10-03 RX ADMIN — Medication 10 ML: at 07:45

## 2021-10-03 RX ADMIN — TIMOLOL MALEATE 1 DROP: 5 SOLUTION OPHTHALMIC at 19:56

## 2021-10-03 RX ADMIN — TIOTROPIUM BROMIDE INHALATION SPRAY 2 PUFF: 3.12 SPRAY, METERED RESPIRATORY (INHALATION) at 08:40

## 2021-10-03 RX ADMIN — ALOGLIPTIN 6.25 MG: 6.25 TABLET, FILM COATED ORAL at 07:43

## 2021-10-03 RX ADMIN — TIMOLOL MALEATE 1 DROP: 5 SOLUTION OPHTHALMIC at 07:45

## 2021-10-03 RX ADMIN — BRIMONIDINE TARTRATE 1 DROP: 2 SOLUTION OPHTHALMIC at 07:45

## 2021-10-03 RX ADMIN — BRIMONIDINE TARTRATE 1 DROP: 2 SOLUTION OPHTHALMIC at 19:56

## 2021-10-03 RX ADMIN — INSULIN LISPRO 3 UNITS: 100 INJECTION, SOLUTION INTRAVENOUS; SUBCUTANEOUS at 07:46

## 2021-10-03 RX ADMIN — Medication 1 PUFF: at 08:40

## 2021-10-03 ASSESSMENT — PAIN SCALES - GENERAL
PAINLEVEL_OUTOF10: 0

## 2021-10-03 NOTE — PROGRESS NOTES
Shift assessment completed. Routine vitals stable. SpO2 95% on 3L O2. Scheduled medications given. Patient is awake, alert and oriented. Respirations are easy and unlabored. Patient does not appear to be in distress. Patient ambulated to bathroom and returned to bed. Bed alarm engaged. Call light within reach. Patient expresses no further needs at this time.

## 2021-10-03 NOTE — PROGRESS NOTES
Progress Note - Dr. Alea Graham - Internal Medicine  PCP: Latia Meeks  [facility], [address] [place] [address] / Alyson James [phone]    Hospital Day: 5  Code Status: Full Code  Current Diet: ADULT DIET; Regular; 3 carb choices (45 gm/meal)        CC: follow up on medical issues    Subjective:   Osvaldo Stokes is a 82 y.o. male. He denies problems    Coverage for dr Johnson Gallardo  Pt doing ok  Remains on o2  We are trying to wean to RA  He would prefer to not go home with o2 If possible    He denies chest pain, denies shortness of breath, denies nausea,  denies emesis. 10 system Review of Systems is reviewed with patient, and pertinent positives are noted in HPI above . Otherwise, Review of systems is negative. I have reviewed the patient's medical and social history in detail and updated the computerized patient record. To recap: He  has no past medical history on file. Angela Mccormack He  has a past surgical history that includes Carpal tunnel release (Bilateral); Lumbar spine surgery; and Cystoscopy. Angela Mccormack He  reports that he has quit smoking. He has quit using smokeless tobacco. He reports previous alcohol use. Angela Mccormack         Active Hospital Problems    Diagnosis Date Noted    Pneumonia due to COVID-19 virus [U07.1, J12.82] 09/28/2021    Type 2 diabetes mellitus without complication ([facility] [address] [T51.0] 09/28/2021    COPD (chronic obstructive pulmonary disease) ([facility]) [J44.9] 09/28/2021    Acquired hypothyroidism [E03.9] 09/28/2021    Acute respiratory failure with hypoxia ([facility]) [J96.01] 09/28/2021       Current Facility-Administered Medications: insulin glargine (LANTUS;BASAGLAR) injection pen 10 Units, 10 Units, SubCUTAneous, Daily  insulin lispro (1 Unit Dial) 0-18 Units, 0-18 Units, SubCUTAneous, TID WC  insulin lispro (1 Unit Dial) 0-9 Units, 0-9 Units, SubCUTAneous, Nightly  dexamethasone (DECADRON) 20 mg in sodium chloride 0.9 % IVPB, 20 mg, IntraVENous, Daily  glucose (GLUTOSE) 40 % oral gel 15 g, 15 g, Oral, PRN  dextrose 50 % IV solution, 12.5 g, IntraVENous, PRN  glucagon (rDNA) injection 1 mg, 1 mg, IntraMUSCular, PRN  dextrose 5 % solution, 100 mL/hr, IntraVENous, PRN  enoxaparin (LOVENOX) injection 60 mg, 1 mg/kg, SubCUTAneous, BID  brimonidine (ALPHAGAN) 0.2 % ophthalmic solution 1 drop, 1 drop, Both Eyes, BID  budesonide-formoterol (SYMBICORT) 160-4.5 MCG/ACT inhaler 1 puff, 1 puff, Inhalation, BID  alogliptin (NESINA) tablet 6.25 mg, 6.25 mg, Oral, Daily  latanoprost (XALATAN) 0.005 % ophthalmic solution 1 drop, 1 drop, Both Eyes, Daily  levothyroxine (SYNTHROID) tablet 75 mcg, 75 mcg, Oral, QAM AC  multivitamin 1 tablet, 1 tablet, Oral, Daily  sodium chloride flush 0.9 % injection 5-40 mL, 5-40 mL, IntraVENous, 2 times per day  sodium chloride flush 0.9 % injection 5-40 mL, 5-40 mL, IntraVENous, PRN  0.9 % sodium chloride infusion, 25 mL, IntraVENous, PRN  ondansetron (ZOFRAN-ODT) disintegrating tablet 4 mg, 4 mg, Oral, Q8H PRN **OR** ondansetron (ZOFRAN) injection 4 mg, 4 mg, IntraVENous, Q6H PRN  polyethylene glycol (GLYCOLAX) packet 17 g, 17 g, Oral, Daily PRN  acetaminophen (TYLENOL) tablet 650 mg, 650 mg, Oral, Q6H PRN **OR** acetaminophen (TYLENOL) suppository 650 mg, 650 mg, Rectal, Q6H PRN  glucose (GLUTOSE) 40 % oral gel 15 g, 15 g, Oral, PRN  dextrose 50 % IV solution, 12.5 g, IntraVENous, PRN  glucagon (rDNA) injection 1 mg, 1 mg, IntraMUSCular, PRN  dextrose 5 % solution, 100 mL/hr, IntraVENous, PRN  Vitamin D (CHOLECALCIFEROL) tablet 2,000 Units, 2,000 Units, Oral, Daily  zinc sulfate (ZINCATE) capsule 50 mg, 50 mg, Oral, Daily  timolol (TIMOPTIC) 0.5 % ophthalmic solution 1 drop, 1 drop, Both Eyes, BID  tiotropium (SPIRIVA RESPIMAT) 2.5 MCG/ACT inhaler 2 puff, 2 puff, Inhalation, Daily         Objective:  BP (!) 165/70   Pulse 84   Temp 98.2 °F (36.8 °C) (Temporal)   Resp 18   Ht 5' 4\" (1.626 m)   Wt 138 lb 12.8 oz (63 kg)   SpO2 91%   BMI 23.82 kg/m²      Patient Vitals for the past 24 hrs:   BP Temp Temp src Pulse Resp SpO2 Weight   10/03/21 1015 -- -- -- 84 -- 91 % --   10/03/21 0842 -- -- -- -- 18 (!) 88 % --   10/03/21 0730 -- 98.2 °F (36.8 °C) Temporal 88 20 92 % --   10/03/21 0515 (!) 165/70 97.9 °F (36.6 °C) Temporal 87 20 90 % 138 lb 12.8 oz (63 kg)   10/03/21 0209 -- -- -- -- -- 90 % --   10/03/21 0118 -- -- -- -- -- 90 % --   10/03/21 0115 -- -- -- -- -- (!) 83 % --   10/02/21 2324 (!) 171/69 98.2 °F (36.8 °C) Temporal 75 18 92 % --   10/02/21 2306 -- -- -- 84 -- -- --   10/02/21 2114 -- -- -- -- -- 95 % --   10/02/21 2108 (!) 164/75 97 °F (36.1 °C) Temporal 99 23 90 % --   10/02/21 2019 -- -- -- -- -- 93 % --   10/02/21 1715 (!) 158/81 97.7 °F (36.5 °C) Temporal 84 18 93 % --   10/02/21 1215 -- -- -- 97 -- 92 % --     Patient Vitals for the past 96 hrs (Last 3 readings):   Weight   10/03/21 0515 138 lb 12.8 oz (63 kg)   10/02/21 0502 137 lb (62.1 kg)           Intake/Output Summary (Last 24 hours) at 10/3/2021 1039  Last data filed at 10/2/2021 1700  Gross per 24 hour   Intake 240 ml   Output --   Net 240 ml         Physical Exam:   BP (!) 165/70   Pulse 84   Temp 98.2 °F (36.8 °C) (Temporal)   Resp 18   Ht 5' 4\" (1.626 m)   Wt 138 lb 12.8 oz (63 kg)   SpO2 91%   BMI 23.82 kg/m²   General appearance: alert, appears stated age and cooperative  Head: Normocephalic, without obvious abnormality, atraumatic  Lungs: clear to auscultation bilaterally  Heart: regular rate and rhythm, S1, S2 normal, no murmur, click, rub or gallop  Abdomen: soft, non-tender; bowel sounds normal; no masses,  no organomegaly  Extremities: extremities normal, atraumatic, no cyanosis or edema    Labs:  Lab Results   Component Value Date    WBC 7.7 10/02/2021    HGB 12.3 (L) 10/02/2021    HCT 36.3 (L) 10/02/2021     10/02/2021    CHOL 147 09/23/2021    TRIG 108 09/23/2021    HDL 49 09/23/2021    ALT 20 09/28/2021    AST 43 (H) 09/28/2021     (L) 10/02/2021    K 4.3 10/02/2021     10/02/2021    CREATININE 1.1 complication ([facility] [address] -Established problem. Stable. FSBS 190. Elevated likely 2/2 decadron  Plan: Continue present orders/plan. COPD (chronic obstructive pulmonary disease) ([facility] [address] -Established problem. Stable.     Plan: cont inhaled tx    Acquired hypothyroidism  Plan: stay on  Synthroid 75    Acute respiratory failure with hypoxia ([facility] [address]            Joe Meeks MD  10/3/2021

## 2021-10-04 VITALS
HEIGHT: 64 IN | WEIGHT: 137.9 LBS | HEART RATE: 71 BPM | OXYGEN SATURATION: 91 % | TEMPERATURE: 97.4 F | BODY MASS INDEX: 23.54 KG/M2 | DIASTOLIC BLOOD PRESSURE: 65 MMHG | SYSTOLIC BLOOD PRESSURE: 122 MMHG | RESPIRATION RATE: 18 BRPM

## 2021-10-04 LAB
ANION GAP SERPL CALCULATED.3IONS-SCNC: 7 MMOL/L (ref 3–16)
BASOPHILS ABSOLUTE: 0 K/UL (ref 0–0.2)
BASOPHILS RELATIVE PERCENT: 0.3 %
BUN BLDV-MCNC: 29 MG/DL (ref 7–20)
CALCIUM SERPL-MCNC: 8.3 MG/DL (ref 8.3–10.6)
CHLORIDE BLD-SCNC: 104 MMOL/L (ref 99–110)
CO2: 25 MMOL/L (ref 21–32)
CREAT SERPL-MCNC: 1.1 MG/DL (ref 0.8–1.3)
EOSINOPHILS ABSOLUTE: 0 K/UL (ref 0–0.6)
EOSINOPHILS RELATIVE PERCENT: 0 %
GFR AFRICAN AMERICAN: >60
GFR NON-AFRICAN AMERICAN: >60
GLUCOSE BLD-MCNC: 106 MG/DL (ref 70–99)
GLUCOSE BLD-MCNC: 240 MG/DL (ref 70–99)
GLUCOSE BLD-MCNC: 303 MG/DL (ref 70–99)
GLUCOSE BLD-MCNC: 94 MG/DL (ref 70–99)
HCT VFR BLD CALC: 34.6 % (ref 40.5–52.5)
HEMOGLOBIN: 11.8 G/DL (ref 13.5–17.5)
LYMPHOCYTES ABSOLUTE: 0.5 K/UL (ref 1–5.1)
LYMPHOCYTES RELATIVE PERCENT: 7.7 %
MCH RBC QN AUTO: 31.4 PG (ref 26–34)
MCHC RBC AUTO-ENTMCNC: 34 G/DL (ref 31–36)
MCV RBC AUTO: 92.2 FL (ref 80–100)
MONOCYTES ABSOLUTE: 0.8 K/UL (ref 0–1.3)
MONOCYTES RELATIVE PERCENT: 11.1 %
NEUTROPHILS ABSOLUTE: 5.7 K/UL (ref 1.7–7.7)
NEUTROPHILS RELATIVE PERCENT: 80.9 %
PDW BLD-RTO: 14.4 % (ref 12.4–15.4)
PERFORMED ON: ABNORMAL
PLATELET # BLD: 181 K/UL (ref 135–450)
PMV BLD AUTO: 8 FL (ref 5–10.5)
POTASSIUM SERPL-SCNC: 4 MMOL/L (ref 3.5–5.1)
RBC # BLD: 3.75 M/UL (ref 4.2–5.9)
SODIUM BLD-SCNC: 136 MMOL/L (ref 136–145)
WBC # BLD: 7 K/UL (ref 4–11)

## 2021-10-04 PROCEDURE — 85025 COMPLETE CBC W/AUTO DIFF WBC: CPT

## 2021-10-04 PROCEDURE — 6370000000 HC RX 637 (ALT 250 FOR IP): Performed by: INTERNAL MEDICINE

## 2021-10-04 PROCEDURE — 80048 BASIC METABOLIC PNL TOTAL CA: CPT

## 2021-10-04 PROCEDURE — 94640 AIRWAY INHALATION TREATMENT: CPT

## 2021-10-04 PROCEDURE — 2580000003 HC RX 258: Performed by: INTERNAL MEDICINE

## 2021-10-04 PROCEDURE — 36415 COLL VENOUS BLD VENIPUNCTURE: CPT

## 2021-10-04 PROCEDURE — 6360000002 HC RX W HCPCS: Performed by: NURSE PRACTITIONER

## 2021-10-04 PROCEDURE — 6360000002 HC RX W HCPCS: Performed by: INTERNAL MEDICINE

## 2021-10-04 PROCEDURE — 2700000000 HC OXYGEN THERAPY PER DAY

## 2021-10-04 PROCEDURE — 94761 N-INVAS EAR/PLS OXIMETRY MLT: CPT

## 2021-10-04 PROCEDURE — 94680 O2 UPTK RST&XERS DIR SIMPLE: CPT

## 2021-10-04 RX ORDER — DEXAMETHASONE SODIUM PHOSPHATE 10 MG/ML
10 INJECTION, SOLUTION INTRAMUSCULAR; INTRAVENOUS DAILY
Status: DISCONTINUED | OUTPATIENT
Start: 2021-10-04 | End: 2021-10-04

## 2021-10-04 RX ORDER — CLONIDINE HYDROCHLORIDE 0.1 MG/1
0.1 TABLET ORAL EVERY 6 HOURS PRN
Status: DISCONTINUED | OUTPATIENT
Start: 2021-10-04 | End: 2021-10-04 | Stop reason: HOSPADM

## 2021-10-04 RX ORDER — DEXAMETHASONE 4 MG/1
6 TABLET ORAL DAILY
Status: DISCONTINUED | OUTPATIENT
Start: 2021-10-04 | End: 2021-10-04 | Stop reason: HOSPADM

## 2021-10-04 RX ORDER — DEXAMETHASONE 6 MG/1
TABLET ORAL
Qty: 6 TABLET | Refills: 0 | Status: ON HOLD
Start: 2021-10-04 | End: 2022-08-15 | Stop reason: CLARIF

## 2021-10-04 RX ADMIN — ALOGLIPTIN 6.25 MG: 6.25 TABLET, FILM COATED ORAL at 09:04

## 2021-10-04 RX ADMIN — TIOTROPIUM BROMIDE INHALATION SPRAY 2 PUFF: 3.12 SPRAY, METERED RESPIRATORY (INHALATION) at 08:02

## 2021-10-04 RX ADMIN — INSULIN GLARGINE 10 UNITS: 100 INJECTION, SOLUTION SUBCUTANEOUS at 12:53

## 2021-10-04 RX ADMIN — ZINC SULFATE 220 MG (50 MG) CAPSULE 50 MG: CAPSULE at 09:04

## 2021-10-04 RX ADMIN — TIMOLOL MALEATE 1 DROP: 5 SOLUTION OPHTHALMIC at 09:10

## 2021-10-04 RX ADMIN — LEVOTHYROXINE SODIUM 75 MCG: 0.15 TABLET ORAL at 05:44

## 2021-10-04 RX ADMIN — DEXAMETHASONE 6 MG: 4 TABLET ORAL at 12:48

## 2021-10-04 RX ADMIN — ENOXAPARIN SODIUM 60 MG: 60 INJECTION SUBCUTANEOUS at 09:04

## 2021-10-04 RX ADMIN — THERA TABS 1 TABLET: TAB at 09:04

## 2021-10-04 RX ADMIN — INSULIN LISPRO 6 UNITS: 100 INJECTION, SOLUTION INTRAVENOUS; SUBCUTANEOUS at 12:53

## 2021-10-04 RX ADMIN — Medication 2000 UNITS: at 09:04

## 2021-10-04 RX ADMIN — Medication 1 PUFF: at 08:02

## 2021-10-04 RX ADMIN — Medication 10 ML: at 09:05

## 2021-10-04 RX ADMIN — BRIMONIDINE TARTRATE 1 DROP: 2 SOLUTION OPHTHALMIC at 09:10

## 2021-10-04 RX ADMIN — CLONIDINE HYDROCHLORIDE 0.1 MG: 0.1 TABLET ORAL at 09:45

## 2021-10-04 RX ADMIN — LATANOPROST 1 DROP: 50 SOLUTION OPHTHALMIC at 09:10

## 2021-10-04 ASSESSMENT — PAIN SCALES - GENERAL
PAINLEVEL_OUTOF10: 0

## 2021-10-04 NOTE — PROGRESS NOTES
Message sent to Dr. Deion Regalado, \"Pt's IV access was lost and I was unable to give IV decadron. I know the plan is to possibly go home today. Would you like to switch to oral decadron or do I need to regain IV access?  Home o2 eval is in, just waiting on d/c orders\"

## 2021-10-04 NOTE — PROGRESS NOTES
10/04/21 1548   Resting (Room Air)   SpO2 92   HR 61   During Walk (Room Air)   SpO2 85   HR 72   Walk/Assistance Device Cane   Rate of Dyspnea 0   During Walk (On O2)   SpO2 87   HR 67   O2 Device Nasal cannula   O2 Flow Rate (l/min) 1 l/min  (87)   Need Additional O2 Flow Rate Rows Yes   O2 Flow Rate (l/min) 2 l/min   O2 Saturation 88   O2 Flow Rate (l/min) 3 l/min   O2 Saturation 90   Walk/Assistance Device Cane   Rate of Dyspnea 0   After Walk   SpO2 90      O2 Device Nasal cannula   O2 Flow Rate (l/min) 3 l/min   Does the Patient Qualify for Home O2 Yes   Does the Patient Need Portable Oxygen Tanks Yes

## 2021-10-04 NOTE — PLAN OF CARE
Problem: Falls - Risk of:  Goal: Will remain free from falls  Description: Will remain free from falls  Outcome: Completed  Goal: Absence of physical injury  Description: Absence of physical injury  Outcome: Completed     Problem: Skin Integrity:  Goal: Will show no infection signs and symptoms  Description: Will show no infection signs and symptoms  Outcome: Completed  Goal: Absence of new skin breakdown  Description: Absence of new skin breakdown  Outcome: Completed     Problem: Airway Clearance - Ineffective  Goal: Achieve or maintain patent airway  Outcome: Completed     Problem: Gas Exchange - Impaired  Goal: Absence of hypoxia  Outcome: Completed  Goal: Promote optimal lung function  Outcome: Completed     Problem: Breathing Pattern - Ineffective  Goal: Ability to achieve and maintain a regular respiratory rate  Outcome: Completed     Problem:  Body Temperature -  Risk of, Imbalanced  Goal: Ability to maintain a body temperature within defined limits  Outcome: Completed  Goal: Will regain or maintain usual level of consciousness  Outcome: Completed  Goal: Complications related to the disease process, condition or treatment will be avoided or minimized  Outcome: Completed     Problem: Isolation Precautions - Risk of Spread of Infection  Goal: Prevent transmission of infection  Outcome: Completed     Problem: Nutrition Deficits  Goal: Optimize nutritional status  Outcome: Completed     Problem: Risk for Fluid Volume Deficit  Goal: Maintain normal heart rhythm  Outcome: Completed  Goal: Maintain absence of muscle cramping  Outcome: Completed  Goal: Maintain normal serum potassium, sodium, calcium, phosphorus, and pH  Outcome: Completed     Problem: Loneliness or Risk for Loneliness  Goal: Demonstrate positive use of time alone when socialization is not possible  Outcome: Completed     Problem: Fatigue  Goal: Verbalize increase energy and improved vitality  Outcome: Completed     Problem: Patient Education: Go to Patient Education Activity  Goal: Patient/Family Education  Outcome: Completed

## 2021-10-04 NOTE — PROGRESS NOTES
Assessment complete. /70, PRN Clonidine given. Pt on 2L NC 91%. Patient resting in bed. Respirations even and easy. Call light in reach. Fall precautions in place. No needs expressed at this time. Will continue to monitor.

## 2021-10-04 NOTE — PROGRESS NOTES
Nutrition Assessment     Type and Reason for Visit: Initial (LOS assessment)    Nutrition Recommendations/Plan:   No nutrition related recommendations at this time     Nutrition Assessment:  Pt assessed for nutrition risk. Pt with good PO intake, consuming % of meals. Deemed to be at low nutrition risk at this time. Will continue to monitor for changes in status. Malnutrition Assessment:  Malnutrition Status: No malnutrition    Nutrition Related Findings: No edema noted      Current Nutrition Therapies:    ADULT DIET;  Regular; 3 carb choices (45 gm/meal)    Anthropometric Measures:  · Height: 5' 4\" (162.6 cm)  · Current Body Wt: 138 lb 0.1 oz (62.6 kg)   · BMI: 23.7    Nutrition Diagnosis:   No nutrition diagnosis at this time     Nutrition Interventions:   Food and/or Nutrient Delivery:  Continue Current Diet  Nutrition Education/Counseling:  Education not indicated   Coordination of Nutrition Care:  Continue to monitor while inpatient    Goals:  Pt will continue to consume at least 50% of meals and supplements       Nutrition Monitoring and Evaluation:   Behavioral-Environmental Outcomes:  None Identified   Food/Nutrient Intake Outcomes:  Food and Nutrient Intake  Physical Signs/Symptoms Outcomes:  None Identified     Discharge Planning:    No discharge needs at this time     Electronically signed by Quinn Reaves RD, LD on 10/4/21 at 2:46 PM EDT    Contact: [phone]

## 2021-10-04 NOTE — CARE COORDINATION
Lisa received a referral to this patient for home 02 @ 3 lpm cont via nc. Pull behind poc has been delivered to the hospital floor for discharge. Thank you for the referral.  Electronically signed by Cecilia Price on 10/4/2021 at 4:52 PM  Cell ph# [phone]    NOTE: After 5:00 pm, Weekends, Holidays: Call [facility] at [phone] to coordinate delivery of home medical equipment.

## 2021-10-04 NOTE — PROGRESS NOTES
Pt discharge paperwork discussed and signed. IV and tele leads removed. Waiting on home o2 to arrive.

## 2021-10-04 NOTE — PROGRESS NOTES
Pt with elevated blood pressures through the shift, provider notified, message read:  Pt is a 81 y/o male patient here for Pneumonia due to COVID-19 virus. Pt has had elevated  blood pressure readings through the shift. Pt has no scheduled or PRN blood pressure medicines. Pt is not on fluids, but is on 2 liters of oxygen. Do you want to put orders or continue to monitor, please advise. Thank you. Awaiting response.   Provider states will monitor BP

## 2021-10-04 NOTE — DISCHARGE SUMMARY
Physician Discharge Summary     Patient ID:  Staci Hull  [id number]  65 y.o.  1935    Admit date: 9/28/2021    Discharge date and time: 10/4/2021    Admitting Physician: Ayaz Roy MD     Discharge Physician: Ayaz Roy MD    Admission Diagnoses: Hypoxia [R09.02]  Pneumonia due to COVID-19 virus [U07.1, J12.82]  COVID-19 [U07.1]    Discharge Diagnoses: Acute Respiratory Failure with hypoxia, Pneumoniadue to covid 19, Covid 19, COPD, T2DM  Full Hospital Problem List:  Active Hospital Problems    Diagnosis Date Noted    Pneumonia due to COVID-19 virus [U07.1, J12.82] 09/28/2021    Type 2 diabetes mellitus without complication ([facility] [address] [M36.0] 09/28/2021    COPD (chronic obstructive pulmonary disease) ([facility] [address] [J44.9] 09/28/2021    Acquired hypothyroidism [E03.9] 09/28/2021    Acute respiratory failure with hypoxia ([facility] [address] [J96.01] 09/28/2021       Discharged Condition: fair    Hospital Course: The patient is an 83 yo  male with pmh significant for copd, type 2 diabetes, hypothyroidism, who originally presented to our office today not feeling well for1 week. Pt is unvaccinated and had + rapid covid test in our office. Was found to by hypoxic and sent to er for further evaluation and management. States he has had a congested cough associated with shortness of breath and wheezing. Found to be hypoxic with Oxgen saturation of 85-86%. Did not appear tachypneic or using accessory muscles. ER work up showed cxr + for covid pneumonia. Pt admitted for further evaluation and management. D-Dimer and Fibrinogen elevated. Pt put on therapeutic dose of lovenox. Pt also started on decadron and remdesivir. He was doing ok but started to requiring higher levels of oxygen. Pulmonology was consulted and pt put on higher dose of steroids. Pt did improve. He is still on 2lpm of oxygen. He will be discharged to home with oxygen, 30 days of eliquis and tapering decadron.   He will follow up in our office in 7-10 days. Disposition: home    Consults made during Hospitalization:  IP CONSULT TO INTERNAL MEDICINE  IP CONSULT TO PULMONOLOGY    Treatment team at time of Discharge: Treatment Team: Attending Provider: Maryuri Salmon MD; Consulting Physician: Maryuri Salmon MD; Respiratory Therapist (Day): Kieth Reyes RCP; Respiratory Therapist (Day): Blaire Trevizo RCP; Utilization Reviewer: Samaria Barakat RN; Registered Nurse: Maeve Nair RN    Imaging Results:  XR CHEST PORTABLE    Result Date: 10/1/2021  EXAMINATION: ONE XRAY VIEW OF THE CHEST 10/1/2021 10:26 am COMPARISON: 09/28/2021 radiograph HISTORY: ORDERING SYSTEM PROVIDED HISTORY: covid pneumonia TECHNOLOGIST PROVIDED HISTORY: Reason for exam:->covid pneumonia Reason for Exam: covid pneumonia Acuity: Unknown Type of Exam: Unknown FINDINGS: The heart, mediastinum and pulmonary vascularity are normal.  Diffuse pattern of mild reticular and ground-glass opacification throughout the right lung, slightly increased. Minimal reticular opacities are stable on the left. No significant skeletal finding. Diffuse airspace changes on the right slightly increased, likely correlating to viral pneumonia given provided history. XR CHEST PORTABLE    Result Date: 9/28/2021  EXAMINATION: ONE XRAY VIEW OF THE CHEST 9/28/2021 3:50 pm COMPARISON: None. HISTORY: ORDERING SYSTEM PROVIDED HISTORY: covid positive. dyspnea TECHNOLOGIST PROVIDED HISTORY: Reason for exam:->covid positive. dyspnea FINDINGS: The cardiomediastinal silhouette is unremarkable. Peripheral ground-glass opacification in the mid right lung. The left lung is relatively clear. There is no pleural fluid or evidence of overt failure. Peripheral ground-glass opacification in the right lung. Findings concerning for pneumonia including COVID-19 pneumonia.        Discharge Exam:  /65   Pulse 71   Temp 97.4 °F (36.3 °C) (Oral)   Resp 18   Ht 5' 4\" (1.626 m)   Wt 137 lb 14.4 oz (62.6 kg)   SpO2 91%   BMI 23.67 kg/m²   General appearance: alert, appears stated age and cooperative  Head: Normocephalic, without obvious abnormality, atraumatic  Lungs: clear to auscultation bilaterally  Chest wall: no tenderness  Heart: regular rate and rhythm, S1, S2 normal, no murmur, click, rub or gallop  Abdomen: soft, non-tender; bowel sounds normal; no masses,  no organomegaly  Extremities: extremities normal, atraumatic, no cyanosis or edema  Pulses: 2+ and symmetric  Skin: Skin color, texture, turgor normal. No rashes or lesions  Neurologic: Grossly normal    Disposition: home    Condition: stable    Discharge Medications:   Octavio, [address] Medication Instructions [facility]:[id number]    Printed on:10/04/21 4915   Medication Information                      apixaban (ELIQUIS) 2.5 MG TABS tablet  Take 1 tablet by mouth 2 times daily             COMBIGAN 0.2-0.5 % ophthalmic solution  Place 1 drop into both eyes every 12 hours              dexamethasone (DECADRON) 6 MG tablet  6mg po qd x 3 days then 3mg po qd x 3 days then stop             fluticasone-umeclidin-vilant (TRELEGY ELLIPTA) 100-62.5-25 MCG/INH AEPB  Inhale 1 puff into the lungs daily             JANUVIA 50 MG tablet  Take 50 mg by mouth daily              latanoprost (XALATAN) 0.005 % ophthalmic solution  Place 1 drop into both eyes daily              levothyroxine (SYNTHROID) 75 MCG tablet  Take 75 mcg by mouth Daily             metFORMIN (GLUCOPHAGE-XR) 500 MG extended release tablet  Take 500 mg by mouth 3 times daily             Multiple Vitamin (MULTIVITAMIN) tablet  Take 1 tablet by mouth daily             triamcinolone (KENALOG) 0.1 % ointment                   Allergies: Allergies   Allergen Reactions    Naproxen Rash         Patient Instructions: Activity: activity as tolerated  Diet: diabetic diet  Wound Care: none needed    Follow up Instructions:   Follow-up with PCP: Kassidy Sebastian MD in  1 week.     Total time spent on day of discharge including face-to-face visit, examination, documentation, counseling, preparation of discharge plans and followup, and discharge medicine reconciliation and presciptions is 36 minutes    Signed:  VALERIO Angulo CNP  10/4/2021  3:30 PM

## 2021-10-04 NOTE — DISCHARGE INSTR - COC
Continuity of Care Form    Patient Name: Sean Lei   :  1935  MRN:  [id number]    Admit date:  2021  Discharge date:  ***    Code Status Order: Full Code   Advance Directives:      Admitting Physician:  Kassidy Sebastian MD  PCP: Kassidy Sebastian MD    Discharging Nurse: [facility] Unit/Room#: 9TC-6256/8708-90  Discharging Unit Phone Number: ***    Emergency Contact:   Extended Emergency Contact Information  Primary Emergency Contact: Braden Cunningham   [address] Phone: [phone]  Relation: Child    Past Surgical History:  Past Surgical History:   Procedure Laterality Date    CARPAL TUNNEL RELEASE Bilateral     CYSTOSCOPY      LUMBAR SPINE SURGERY         Immunization History: There is no immunization history on file for this patient.     Active Problems:  Patient Active Problem List   Diagnosis Code    Pneumonia due to COVID-19 virus U07.1, J12.82    Type 2 diabetes mellitus without complication ([facility] [address] A40.0    COPD (chronic obstructive pulmonary disease) ([facility] [address] J44.9    Acquired hypothyroidism E03.9    Acute respiratory failure with hypoxia ([facility] [address] J96.01       Isolation/Infection:   Isolation            Droplet Plus          Patient Infection Status       Infection Onset Added Last Indicated Last Indicated By Review Planned Expiration Resolved Resolved By    COVID-19 21 COVID-19 10/06/21 10/13/21      9/28/21 positive rapid at PCP office    Resolved    C-diff Rule Out 20 Gastrointestinal Panel, Molecular (Ordered)   20 Rule-Out Test Resulted            Nurse Assessment:  Last Vital Signs: /65   Pulse 71   Temp 97.4 °F (36.3 °C) (Oral)   Resp 18   Ht 5' 4\" (1.626 m)   Wt 137 lb 14.4 oz (62.6 kg)   SpO2 91%   BMI 23.67 kg/m²     Last documented pain score (0-10 scale): Pain Level: 0  Last Weight:   Wt Readings from Last 1 Encounters:   10/04/21 137 lb 14.4 oz (62.6 kg)     Mental Status: {IP PT MENTAL STATUS::::0}    IV Access:  { MICAH IV ACCESS:015897517:::0}    Nursing Mobility/ADLs:  Walking   {CHP DME ADLs:450046014:::0}  Transfer  {CHP DME ADLs:086510000:::0}  Bathing  {CHP DME ADLs:331282483:::0}  Dressing  {CHP DME ADLs:350208153:::0}  Toileting  {CHP DME ADLs:378867931:::0}  Feeding  {CHP DME ADLs:911804565:::0}  Med Admin  {CHP DME ADLs:037995611:::0}  Med Delivery   { MICAH MED Delivery:403130415:::0}    Wound Care Documentation and Therapy:        Elimination:  Continence: Bowel: {YES / QV:25375}  Bladder: {YES / RD:51550}  Urinary Catheter: {Urinary Catheter:952733064:::0}   Colostomy/Ileostomy/Ileal Conduit: {YES / XM:41802}       Date of Last BM: ***    Intake/Output Summary (Last 24 hours) at 10/4/2021 1612  Last data filed at 10/3/2021 1821  Gross per 24 hour   Intake 240 ml   Output --   Net 240 ml     I/O last 3 completed shifts:   In: 240 [P.O.:240]  Out: -     Safety Concerns:     [address] Safety Concerns:073522338:::0}    Impairments/Disabilities:      [address] Impairments/Disabilities:674023076:::0}    Nutrition Therapy:  Current Nutrition Therapy:   [address] Diet List:073780912:::0}    Routes of Feeding: {CHP DME Other Feedings:434749052:::0}  Liquids: {Slp liquid thickness:69533}  Daily Fluid Restriction: {CHP DME Yes amt example:832860214:::0}  Last Modified Barium Swallow with Video (Video Swallowing Test): {Done Not Done MQUV:988578294:::1}    Treatments at the Time of Hospital Discharge:   Respiratory Treatments: ***  Oxygen Therapy:  {Therapy; copd oxygen:18651:::0}  Ventilator:    { CC Vent List:397439710:::0}    Rehab Therapies: {THERAPEUTIC INTERVENTION:6041025153}  Weight Bearing Status/Restrictions: [address] GILES Weight Bearin:::0}  Other Medical Equipment (for information only, NOT a DME order):  {EQUIPMENT:156648965}  Other Treatments: ***    Patient's personal belongings (please select all that are sent with patient):  {[facility] DME Belongings:977344746:::0}    RN SIGNATURE:  {Esignature:862921026:::0}    CASE MANAGEMENT/SOCIAL WORK SECTION    Inpatient Status Date: ***    Readmission Risk Assessment Score:  Readmission Risk              Risk of Unplanned Readmission:  17           Discharging to Facility/ Agency   Name:   Address:  Phone:  Fax:    Dialysis Facility (if applicable)   Name:  Address:  Dialysis Schedule:  Phone:  Fax:    / signature: {Esignature:740556489:::0}    PHYSICIAN SECTION    Prognosis: Fair    Condition at Discharge: Stable    Rehab Potential (if transferring to Rehab): Good    Recommended Labs or Other Treatments After Discharge: Oxygen therapy 3lpm per nc,     Physician Certification: I certify the above information and transfer of Crow Roblero  is necessary for the continuing treatment of the diagnosis listed and that he requires Home Care for less 30 days.      Update Admission H&P: No change in H&P    PHYSICIAN SIGNATURE:  Electronically signed by VALERIO Allen CNP on 10/4/21 at [address] [place] [address] EDT

## 2021-10-05 ENCOUNTER — CARE COORDINATION (OUTPATIENT)
Dept: CASE MANAGEMENT | Age: 86
End: 2021-10-05

## 2021-10-05 NOTE — CARE COORDINATION
S[facility] Transitions Initial Follow Up Call    Call within 2 business days of discharge: Yes    Patient: Sean Lei Patient : 1935   MRN: [id number]  Reason for Admission: COVID PNA, Hypoxia  Discharge Date: 10/4/21 RARS: Readmission Risk Score: 17    First attempt at 24 hour discharge call, no answer, CTN left VM with contact information and request for return call. CTN will continue with outreach call attempts.       Francisco Javier May RN

## 2021-10-06 ENCOUNTER — CARE COORDINATION (OUTPATIENT)
Dept: CASE MANAGEMENT | Age: 86
End: 2021-10-06

## 2021-10-06 NOTE — CARE COORDINATION
Saurav 45 Transitions Initial Follow Up Call    Call within 2 business days of discharge: Yes    Patient: Tracy Meade Patient : 1935   MRN: [id number]  Reason for Admission: COVID PNA, Hypoxia  Discharge Date: 10/4/21 RARS: Readmission Risk Score: 17    Second attempt at 24 hour discharge call, no answer, CTN left VM with contact information and request for return call. CTN will resolve episode and remain available.       Aure Oliveros RN

## 2021-11-09 ENCOUNTER — TELEPHONE (OUTPATIENT)
Dept: VASCULAR SURGERY | Age: 86
End: 2021-11-09

## 2021-11-09 DIAGNOSIS — I71.40 ABDOMINAL AORTIC ANEURYSM WITHOUT RUPTURE: Primary | ICD-10-CM

## 2021-11-11 ENCOUNTER — HOSPITAL ENCOUNTER (OUTPATIENT)
Age: 86
Discharge: HOME | End: 2021-11-11
Payer: MEDICARE

## 2021-11-11 ENCOUNTER — HOSPITAL ENCOUNTER (OUTPATIENT)
Dept: GENERAL RADIOLOGY | Age: 86
Discharge: HOME | End: 2021-11-11
Payer: MEDICARE

## 2021-11-11 DIAGNOSIS — J18.9 UNRESOLVED PNEUMONIA: ICD-10-CM

## 2021-11-11 PROCEDURE — 71046 X-RAY EXAM CHEST 2 VIEWS: CPT

## 2022-07-25 ENCOUNTER — HOSPITAL ENCOUNTER (OUTPATIENT)
Dept: GENERAL RADIOLOGY | Age: 87
Discharge: HOME | End: 2022-07-25
Payer: MEDICARE

## 2022-07-25 ENCOUNTER — HOSPITAL ENCOUNTER (OUTPATIENT)
Age: 87
Discharge: HOME | End: 2022-07-25
Payer: MEDICARE

## 2022-07-25 DIAGNOSIS — R05.1 ACUTE COUGH: ICD-10-CM

## 2022-07-25 PROCEDURE — 71046 X-RAY EXAM CHEST 2 VIEWS: CPT

## 2022-08-13 ENCOUNTER — APPOINTMENT (OUTPATIENT)
Dept: CT IMAGING | Age: 87
End: 2022-08-13
Payer: MEDICARE

## 2022-08-13 ENCOUNTER — HOSPITAL ENCOUNTER (EMERGENCY)
Age: 87
Discharge: ANOTHER HOSPITAL | End: 2022-08-13
Attending: EMERGENCY MEDICINE | Admitting: INTERNAL MEDICINE
Payer: MEDICARE

## 2022-08-13 ENCOUNTER — HOSPITAL ENCOUNTER (INPATIENT)
Age: 87
LOS: 2 days | Discharge: HOME HEALTH | DRG: 086 | End: 2022-08-15
Attending: INTERNAL MEDICINE | Admitting: INTERNAL MEDICINE
Payer: MEDICARE

## 2022-08-13 ENCOUNTER — APPOINTMENT (OUTPATIENT)
Dept: CT IMAGING | Age: 87
DRG: 086 | End: 2022-08-13
Attending: INTERNAL MEDICINE
Payer: MEDICARE

## 2022-08-13 VITALS
SYSTOLIC BLOOD PRESSURE: 173 MMHG | BODY MASS INDEX: 23.56 KG/M2 | DIASTOLIC BLOOD PRESSURE: 69 MMHG | HEART RATE: 79 BPM | RESPIRATION RATE: 18 BRPM | HEIGHT: 64 IN | TEMPERATURE: 98 F | OXYGEN SATURATION: 99 % | WEIGHT: 138 LBS

## 2022-08-13 DIAGNOSIS — R40.20 LOSS OF CONSCIOUSNESS (HCC): ICD-10-CM

## 2022-08-13 DIAGNOSIS — R77.8 ELEVATED TROPONIN: ICD-10-CM

## 2022-08-13 DIAGNOSIS — I61.5 INTRAVENTRICULAR HEMORRHAGE (HCC): Primary | ICD-10-CM

## 2022-08-13 DIAGNOSIS — N17.9 AKI (ACUTE KIDNEY INJURY) (HCC): ICD-10-CM

## 2022-08-13 DIAGNOSIS — Z79.01 ON CONTINUOUS ORAL ANTICOAGULATION: ICD-10-CM

## 2022-08-13 PROBLEM — I62.9 ACUTE INTRACRANIAL HEMORRHAGE (HCC): Status: ACTIVE | Noted: 2022-08-13

## 2022-08-13 PROBLEM — I61.9 BRAIN BLEED (HCC): Status: ACTIVE | Noted: 2022-08-13

## 2022-08-13 LAB
A/G RATIO: 0.9 (ref 1.1–2.2)
ALBUMIN SERPL-MCNC: 3.5 G/DL (ref 3.4–5)
ALP BLD-CCNC: 82 U/L (ref 40–129)
ALT SERPL-CCNC: 14 U/L (ref 10–40)
ANION GAP SERPL CALCULATED.3IONS-SCNC: 10 MMOL/L (ref 3–16)
ANION GAP SERPL CALCULATED.3IONS-SCNC: 7 MMOL/L (ref 3–16)
AST SERPL-CCNC: 23 U/L (ref 15–37)
BASOPHILS ABSOLUTE: 0 K/UL (ref 0–0.2)
BASOPHILS RELATIVE PERCENT: 0.2 %
BILIRUB SERPL-MCNC: <0.2 MG/DL (ref 0–1)
BILIRUBIN URINE: NEGATIVE
BLOOD, URINE: ABNORMAL
BUN BLDV-MCNC: 33 MG/DL (ref 7–20)
BUN BLDV-MCNC: 36 MG/DL (ref 7–20)
CALCIUM SERPL-MCNC: 8.3 MG/DL (ref 8.3–10.6)
CALCIUM SERPL-MCNC: 9.9 MG/DL (ref 8.3–10.6)
CHLORIDE BLD-SCNC: 92 MMOL/L (ref 99–110)
CHLORIDE BLD-SCNC: 99 MMOL/L (ref 99–110)
CLARITY: CLEAR
CO2: 28 MMOL/L (ref 21–32)
CO2: 28 MMOL/L (ref 21–32)
COLOR: YELLOW
CREAT SERPL-MCNC: 1.4 MG/DL (ref 0.8–1.3)
CREAT SERPL-MCNC: 1.7 MG/DL (ref 0.8–1.3)
EKG ATRIAL RATE: 71 BPM
EKG DIAGNOSIS: NORMAL
EKG P AXIS: 71 DEGREES
EKG P-R INTERVAL: 206 MS
EKG Q-T INTERVAL: 446 MS
EKG QRS DURATION: 88 MS
EKG QTC CALCULATION (BAZETT): 484 MS
EKG R AXIS: 48 DEGREES
EKG T AXIS: 54 DEGREES
EKG VENTRICULAR RATE: 71 BPM
EOSINOPHILS ABSOLUTE: 0.2 K/UL (ref 0–0.6)
EOSINOPHILS RELATIVE PERCENT: 1.4 %
GFR AFRICAN AMERICAN: 47
GFR AFRICAN AMERICAN: 58
GFR NON-AFRICAN AMERICAN: 38
GFR NON-AFRICAN AMERICAN: 48
GLUCOSE BLD-MCNC: 107 MG/DL (ref 70–99)
GLUCOSE BLD-MCNC: 180 MG/DL (ref 70–99)
GLUCOSE BLD-MCNC: 219 MG/DL (ref 70–99)
GLUCOSE BLD-MCNC: 238 MG/DL (ref 70–99)
GLUCOSE URINE: 250 MG/DL
HCT VFR BLD CALC: 31.9 % (ref 40.5–52.5)
HEMOGLOBIN: 10.4 G/DL (ref 13.5–17.5)
KETONES, URINE: NEGATIVE MG/DL
LEUKOCYTE ESTERASE, URINE: NEGATIVE
LYMPHOCYTES ABSOLUTE: 0.9 K/UL (ref 1–5.1)
LYMPHOCYTES RELATIVE PERCENT: 7.6 %
MCH RBC QN AUTO: 29 PG (ref 26–34)
MCHC RBC AUTO-ENTMCNC: 32.6 G/DL (ref 31–36)
MCV RBC AUTO: 89 FL (ref 80–100)
MICROSCOPIC EXAMINATION: YES
MONOCYTES ABSOLUTE: 1.2 K/UL (ref 0–1.3)
MONOCYTES RELATIVE PERCENT: 10.1 %
NEUTROPHILS ABSOLUTE: 9.5 K/UL (ref 1.7–7.7)
NEUTROPHILS RELATIVE PERCENT: 80.7 %
NITRITE, URINE: NEGATIVE
PDW BLD-RTO: 16.6 % (ref 12.4–15.4)
PERFORMED ON: ABNORMAL
PERFORMED ON: ABNORMAL
PH UA: 7 (ref 5–8)
PLATELET # BLD: 292 K/UL (ref 135–450)
PMV BLD AUTO: 8 FL (ref 5–10.5)
POTASSIUM SERPL-SCNC: 3.6 MMOL/L (ref 3.5–5.1)
POTASSIUM SERPL-SCNC: 3.9 MMOL/L (ref 3.5–5.1)
PROTEIN UA: 100 MG/DL
RBC # BLD: 3.58 M/UL (ref 4.2–5.9)
RBC UA: NORMAL /HPF (ref 0–4)
SODIUM BLD-SCNC: 130 MMOL/L (ref 136–145)
SODIUM BLD-SCNC: 134 MMOL/L (ref 136–145)
SPECIFIC GRAVITY UA: 1.01 (ref 1–1.03)
TOTAL CK: 132 U/L (ref 39–308)
TOTAL PROTEIN: 7.4 G/DL (ref 6.4–8.2)
TROPONIN: 0.02 NG/ML
TROPONIN: 0.02 NG/ML
TROPONIN: 0.03 NG/ML
URINE TYPE: ABNORMAL
UROBILINOGEN, URINE: 0.2 E.U./DL
WBC # BLD: 11.8 K/UL (ref 4–11)
WBC UA: NORMAL /HPF (ref 0–5)

## 2022-08-13 PROCEDURE — 6360000002 HC RX W HCPCS

## 2022-08-13 PROCEDURE — 2000000000 HC ICU R&B

## 2022-08-13 PROCEDURE — 84484 ASSAY OF TROPONIN QUANT: CPT

## 2022-08-13 PROCEDURE — 6360000002 HC RX W HCPCS: Performed by: EMERGENCY MEDICINE

## 2022-08-13 PROCEDURE — 2500000003 HC RX 250 WO HCPCS

## 2022-08-13 PROCEDURE — 80053 COMPREHEN METABOLIC PANEL: CPT

## 2022-08-13 PROCEDURE — 94761 N-INVAS EAR/PLS OXIMETRY MLT: CPT

## 2022-08-13 PROCEDURE — 2700000000 HC OXYGEN THERAPY PER DAY

## 2022-08-13 PROCEDURE — 82550 ASSAY OF CK (CPK): CPT

## 2022-08-13 PROCEDURE — 96361 HYDRATE IV INFUSION ADD-ON: CPT

## 2022-08-13 PROCEDURE — 96360 HYDRATION IV INFUSION INIT: CPT

## 2022-08-13 PROCEDURE — 81001 URINALYSIS AUTO W/SCOPE: CPT

## 2022-08-13 PROCEDURE — 96365 THER/PROPH/DIAG IV INF INIT: CPT

## 2022-08-13 PROCEDURE — 93010 ELECTROCARDIOGRAM REPORT: CPT | Performed by: INTERNAL MEDICINE

## 2022-08-13 PROCEDURE — 70450 CT HEAD/BRAIN W/O DYE: CPT

## 2022-08-13 PROCEDURE — 85025 COMPLETE CBC W/AUTO DIFF WBC: CPT

## 2022-08-13 PROCEDURE — 2580000003 HC RX 258: Performed by: EMERGENCY MEDICINE

## 2022-08-13 PROCEDURE — 94640 AIRWAY INHALATION TREATMENT: CPT

## 2022-08-13 PROCEDURE — 99285 EMERGENCY DEPT VISIT HI MDM: CPT

## 2022-08-13 PROCEDURE — 93005 ELECTROCARDIOGRAM TRACING: CPT | Performed by: EMERGENCY MEDICINE

## 2022-08-13 RX ORDER — VALSARTAN 40 MG/1
40 TABLET ORAL DAILY
COMMUNITY

## 2022-08-13 RX ORDER — LABETALOL HYDROCHLORIDE 5 MG/ML
10 INJECTION, SOLUTION INTRAVENOUS ONCE
Status: COMPLETED | OUTPATIENT
Start: 2022-08-13 | End: 2022-08-13

## 2022-08-13 RX ORDER — ARFORMOTEROL TARTRATE 15 UG/2ML
15 SOLUTION RESPIRATORY (INHALATION) 2 TIMES DAILY
Status: DISCONTINUED | OUTPATIENT
Start: 2022-08-13 | End: 2022-08-15 | Stop reason: HOSPADM

## 2022-08-13 RX ORDER — SODIUM CHLORIDE 9 MG/ML
50 INJECTION, SOLUTION INTRAVENOUS ONCE
Status: COMPLETED | OUTPATIENT
Start: 2022-08-13 | End: 2022-08-13

## 2022-08-13 RX ORDER — ACETAMINOPHEN 160 MG
TABLET,DISINTEGRATING ORAL 2 TIMES DAILY
COMMUNITY

## 2022-08-13 RX ORDER — LABETALOL HYDROCHLORIDE 5 MG/ML
10 INJECTION, SOLUTION INTRAVENOUS EVERY 4 HOURS PRN
Status: DISCONTINUED | OUTPATIENT
Start: 2022-08-13 | End: 2022-08-13

## 2022-08-13 RX ORDER — ACETAMINOPHEN 325 MG/1
650 TABLET ORAL EVERY 4 HOURS PRN
Status: DISCONTINUED | OUTPATIENT
Start: 2022-08-13 | End: 2022-08-15 | Stop reason: HOSPADM

## 2022-08-13 RX ORDER — LEVOTHYROXINE SODIUM 0.07 MG/1
75 TABLET ORAL DAILY
Status: DISCONTINUED | OUTPATIENT
Start: 2022-08-14 | End: 2022-08-15 | Stop reason: HOSPADM

## 2022-08-13 RX ORDER — INSULIN LISPRO 100 [IU]/ML
0-4 INJECTION, SOLUTION INTRAVENOUS; SUBCUTANEOUS NIGHTLY
Status: DISCONTINUED | OUTPATIENT
Start: 2022-08-13 | End: 2022-08-15 | Stop reason: HOSPADM

## 2022-08-13 RX ORDER — 0.9 % SODIUM CHLORIDE 0.9 %
1000 INTRAVENOUS SOLUTION INTRAVENOUS ONCE
Status: COMPLETED | OUTPATIENT
Start: 2022-08-13 | End: 2022-08-13

## 2022-08-13 RX ORDER — BUDESONIDE 0.5 MG/2ML
0.5 INHALANT ORAL 2 TIMES DAILY
Status: DISCONTINUED | OUTPATIENT
Start: 2022-08-13 | End: 2022-08-15 | Stop reason: HOSPADM

## 2022-08-13 RX ORDER — INSULIN LISPRO 100 [IU]/ML
0-4 INJECTION, SOLUTION INTRAVENOUS; SUBCUTANEOUS
Status: DISCONTINUED | OUTPATIENT
Start: 2022-08-13 | End: 2022-08-15 | Stop reason: HOSPADM

## 2022-08-13 RX ORDER — LABETALOL HYDROCHLORIDE 5 MG/ML
5 INJECTION, SOLUTION INTRAVENOUS EVERY 4 HOURS PRN
Status: DISCONTINUED | OUTPATIENT
Start: 2022-08-13 | End: 2022-08-15 | Stop reason: HOSPADM

## 2022-08-13 RX ORDER — CARVEDILOL 3.12 MG/1
3.12 TABLET ORAL 2 TIMES DAILY WITH MEALS
COMMUNITY

## 2022-08-13 RX ORDER — DEXTROSE MONOHYDRATE 100 MG/ML
INJECTION, SOLUTION INTRAVENOUS CONTINUOUS PRN
Status: DISCONTINUED | OUTPATIENT
Start: 2022-08-13 | End: 2022-08-15 | Stop reason: HOSPADM

## 2022-08-13 RX ORDER — ONDANSETRON 4 MG/1
4 TABLET, ORALLY DISINTEGRATING ORAL EVERY 8 HOURS PRN
Status: DISCONTINUED | OUTPATIENT
Start: 2022-08-13 | End: 2022-08-15 | Stop reason: HOSPADM

## 2022-08-13 RX ORDER — ONDANSETRON 2 MG/ML
4 INJECTION INTRAMUSCULAR; INTRAVENOUS EVERY 6 HOURS PRN
Status: DISCONTINUED | OUTPATIENT
Start: 2022-08-13 | End: 2022-08-15 | Stop reason: HOSPADM

## 2022-08-13 RX ADMIN — IPRATROPIUM BROMIDE 0.5 MG: 0.5 SOLUTION RESPIRATORY (INHALATION) at 20:11

## 2022-08-13 RX ADMIN — SODIUM CHLORIDE 1000 ML: 9 INJECTION, SOLUTION INTRAVENOUS at 09:41

## 2022-08-13 RX ADMIN — PROTHROMBIN, COAGULATION FACTOR VII HUMAN, COAGULATION FACTOR IX HUMAN, COAGULATION FACTOR X HUMAN, PROTEIN C, PROTEIN S HUMAN, AND WATER 3000 UNITS: KIT at 11:45

## 2022-08-13 RX ADMIN — LABETALOL HYDROCHLORIDE 10 MG: 5 INJECTION, SOLUTION INTRAVENOUS at 16:38

## 2022-08-13 RX ADMIN — ARFORMOTEROL TARTRATE 15 MCG: 15 SOLUTION RESPIRATORY (INHALATION) at 20:11

## 2022-08-13 RX ADMIN — SODIUM CHLORIDE 50 ML: 9 INJECTION, SOLUTION INTRAVENOUS at 12:04

## 2022-08-13 RX ADMIN — BUDESONIDE 500 MCG: 0.5 SUSPENSION RESPIRATORY (INHALATION) at 20:11

## 2022-08-13 ASSESSMENT — ENCOUNTER SYMPTOMS
ABDOMINAL PAIN: 0
NAUSEA: 0
SHORTNESS OF BREATH: 0
CHEST TIGHTNESS: 0
BACK PAIN: 0
VOMITING: 0

## 2022-08-13 ASSESSMENT — PAIN - FUNCTIONAL ASSESSMENT: PAIN_FUNCTIONAL_ASSESSMENT: NONE - DENIES PAIN

## 2022-08-13 NOTE — H&P
ICU HISTORY AND PHYSICAL       Hospital Day:   ICU Day:                                                          Code:Full Code  Admit Date: 8/13/2022  PCP: Prisca Sanchez MD                                  CC: [facility]    HISTORY OF PRESENT ILLNESS:   82yoM with hx HTN, HLD, CHF, AF (Eliquis) and CVA early this year who initially presented to [facility] for [address]. Per son, cameras were installed after previous CVA which showed pt attempting to ambulate w/ walker around 0148 this morning when he fell on his L side. No head injury. Pt denies recollection of events and states he first remembers being found by family around 0730. Son states pt was speaking normally and oriented at that time. At [facility], labs remarkable for elevated troponin and Crt. CT showed acute IVH to L lateral ventricle. Pt was transferred to [facility] for Nsgy management. Pt currently resting comfortably in NAD. States he has no recollection of initial events or reason for fall. Has mild difficulty finding words occasionally, but son states this is unchanged from previous CVA. He denies any headache, fever, chills, chest pain, dyspnea, abdominal pain, nausea, vomiting, visual changes, slurred speech, numbness, or weakness. PAST HISTORY:     Past Medical History:   Diagnosis Date    Atrial fibrillation (HCC)     CHF (congestive heart failure) (HCC)     Hypertension     Thyroid disease        Past Surgical History:   Procedure Laterality Date    CARPAL TUNNEL RELEASE Bilateral     CYSTOSCOPY      LUMBAR SPINE SURGERY         SocialHistory:   The patient lives at home with son    Alcohol:   Illicit drugs: no use  Tobacco:      Family History:  Family History   Family history unknown: Yes       MEDICATIONS:     No current facility-administered medications on file prior to encounter.      Current Outpatient Medications on File Prior to Encounter   Medication Sig Dispense Refill    dexamethasone (DECADRON) 6 MG tablet 6mg po qd x 3 days then 3mg po qd x 3 days then stop 6 tablet 0    apixaban (ELIQUIS) 2.5 MG TABS tablet Take 1 tablet by mouth 2 times daily 60 tablet 0    fluticasone-umeclidin-vilant (TRELEGY ELLIPTA) 100-62.5-25 MCG/INH AEPB Inhale 1 puff into the lungs daily      metFORMIN (GLUCOPHAGE-XR) 500 MG extended release tablet Take 500 mg by mouth 3 times daily      levothyroxine (SYNTHROID) 75 MCG tablet Take 75 mcg by mouth Daily      JANUVIA 50 MG tablet Take 50 mg by mouth daily       COMBIGAN 0.2-0.5 % ophthalmic solution Place 1 drop into both eyes every 12 hours       latanoprost (XALATAN) 0.005 % ophthalmic solution Place 1 drop into both eyes daily       Multiple Vitamin (MULTIVITAMIN) tablet Take 1 tablet by mouth daily      triamcinolone (KENALOG) 0.1 % ointment            Scheduled Meds:   budesonide  0.5 mg Nebulization BID    ipratropium  0.5 mg Nebulization 4x daily    Arformoterol Tartrate  15 mcg Nebulization BID      Continuous Infusions:  PRN Meds:acetaminophen, ondansetron **OR** ondansetron, labetalol    Allergies: Allergies   Allergen Reactions    Naproxen Rash       REVIEW OF SYSTEMS:       History obtained from the patient    Review of Systems   Constitutional:  Negative for chills, diaphoresis and fever. Eyes:  Negative for visual disturbance. Respiratory:  Negative for chest tightness and shortness of breath. Cardiovascular:  Negative for chest pain. Gastrointestinal:  Negative for abdominal pain, nausea and vomiting. Musculoskeletal:  Negative for back pain. Neurological:  Negative for dizziness, facial asymmetry, speech difficulty, weakness, light-headedness, numbness and headaches. All other systems reviewed and are negative. PHYSICAL EXAM:       Vitals: BP (!) 182/76   Pulse 80   Resp 19   SpO2 91%     I/O:  No intake or output data in the 24 hours ending 08/13/22 1710  No intake/output data recorded. No intake/output data recorded.     Physical Examination:     Physical Exam  Vitals and nursing note reviewed. Constitutional:       General: He is not in acute distress. Appearance: Normal appearance. He is not ill-appearing. HENT:      Mouth/Throat:      Mouth: Mucous membranes are moist.      Pharynx: Oropharynx is clear. Cardiovascular:      Rate and Rhythm: Normal rate. Rhythm irregular. Pulses: Normal pulses. Heart sounds: No murmur heard. No friction rub. No gallop. Pulmonary:      Effort: Pulmonary effort is normal. No respiratory distress. Breath sounds: Normal breath sounds. No wheezing, rhonchi or rales. Abdominal:      General: Abdomen is flat. There is no distension. Palpations: Abdomen is soft. Tenderness: There is no abdominal tenderness. Skin:     General: Skin is warm and dry. Coloration: Skin is not jaundiced or pale. Neurological:      General: No focal deficit present. Mental Status: He is alert and oriented to person, place, and time. Mental status is at baseline. Cranial Nerves: No cranial nerve deficit. Sensory: No sensory deficit. Motor: No weakness. No results for input(s): PHART, DAO4AHW, PO2ART in the last 72 hours. DATA:       Labs:  CBC:   Recent Labs     08/13/22  0852   WBC 11.8*   HGB 10.4*   HCT 31.9*          BMP:   Recent Labs     08/13/22  0852 08/13/22  1037   * 134*   K 3.9 3.6   CL 92* 99   CO2 28 28   BUN 36* 33*   CREATININE 1.7* 1.4*   GLUCOSE 238* 180*     LFT's:   Recent Labs     08/13/22  0852   AST 23   ALT 14   BILITOT <0.2   ALKPHOS 82     Troponin:   Recent Labs     08/13/22  0852 08/13/22  1037   TROPONINI 0.02* 0.03*     BNP:No results for input(s): BNP in the last 72 hours. ABGs: No results for input(s): PHART, SGR5RRI, PO2ART in the last 72 hours. INR: No results for input(s): INR in the last 72 hours.     U/A:  Recent Labs     08/13/22  1003   COLORU Yellow   PHUR 7.0   WBCUA 0-2   RBCUA 0-2   CLARITYU Clear   SPECGRAV 1.015   LEUKOCYTESUR Negative UROBILINOGEN 0.2   BILIRUBINUR Negative   BLOODU TRACE-LYSED*   GLUCOSEU 250*       CT HEAD WO CONTRAST    (Results Pending)       ASSESSMENT AND PLAN:   82yoM with hx HTN, HLD, CHF, AF (Eliquis) and CVA early this year presenting as transfer from [facility] for IVH. GLF captured on camera around 0148 on 8/13. Pt w/o memory of events. CT showed acute IVH to L lateral ventricle. No focal deficits. IVH  Last known well yesterday evening. GLF at home at [address] CT showing acute IVH to L lateral ventricle. - q1 neuro checks  - Nsgy cx, f/u final recs  - Hold eliquis; received Kcentra at OSH  - Goal SBP <160, Labetalol prn  - Repeat head CT stable from prior  - Cx nsgy for any change in exam    2. TAYLER  Crt 1.4 on admission, baseline 1.0-1.2  - Resume diet  - Trend daily    3. HTN  - Son reports pt on carvedilol, lasix at home  - F/u med rec  - PRN labetalol    4.  DM  - Hold home [place] [facility] [place]  - LDSS  - Hypoglycemia protocol      Code Status:Full Code  FEN: NPO  PPX:  Held  DISPO: ICU    This patient has been staffed and discussed with Janusz Perea MD.   -----------------------------  Juan Villarreal MD, PGY-1  8/13/2022  5:10 PM

## 2022-08-13 NOTE — PROGRESS NOTES
Receive pt from EMS via [facility]/ pt has not had any of his or any bp meds today/ sbp >180/  + needs follow up CT/ ICU team called to bedside for orders/ otherwise pt A/O x4 neuro intact/ no complaints of pain/ states he is hungry has not been able to eat anything today/ pt understands we need 2nd CT / see orders/ notes/ flow sheet/ son  (Danny)at bedside numbers exchanged

## 2022-08-13 NOTE — ED NOTES
[phone]- called [facility] for [facility] transfer per Dr. Jim Cruz   RE: Erick Otoniel loc/ elevated troponin  1203- Called for update of transfer  12- Dr. Kim Gonzalez called back and spoke with Dr. Thao [place]  08/13/22 1758

## 2022-08-13 NOTE — PLAN OF CARE
[facility] note    Called for consult on 81yo M s/p fall with small amount of IVH on CT scan. Pt. On Eliquis. Per Lori An ER- GCS 15    Recommend:  Transfer to [facility]. for q1h neuro checks  Reverse Eliquis  Goal SBP <160  Repeat CT for stability 6 hours from initial scan  Call for change in exam or questions.   Full consult to follow tomorrow Merry Ken MD

## 2022-08-13 NOTE — ED NOTES
67- called [facility] for consult per    RE: intracranial hemorrhage. Fall.   46- Dr. Grimaldo Captain called back and spoke with Dr. Syed Calloway  08/13/22 [phone]

## 2022-08-13 NOTE — ED NOTES
Report given to EMS. Patient transported to [facility].       Riley Riojas RN  08/13/22 8886

## 2022-08-13 NOTE — PROGRESS NOTES
4 Eyes Skin Assessment     The patient is being assess for   Admission    I agree that 2 RN's have performed a thorough Head to Toe Skin Assessment on the patient. ALL assessment sites listed below have been assessed. Areas assessed for pressure by both nurses:   [x]   Head, Face, and Ears   [x]   Shoulders, Back, and Chest, Abdomen  [x]   Arms, Elbows, and Hands   [x]   Coccyx, Sacrum, and Ischium  [x]   Legs, Feet, and Heels        Skin Assessed Under all Medical Devices by both nurses:  Nasal cannula               All Mepilex Borders were peeled back and area peeked at by both nurses:  none present               **SHARE this note so that the co-signing nurse is able to place an eSignature**    Co-signer eSignature: Electronically signed by Lalita Reyes RN on 8/13/22 at 6:18 PM EDT    Does the Patient have Skin Breakdown related to pressure?   No            Guido Prevention initiated:  Yes   Wound Care Orders initiated:  No      WOC nurse consulted for Pressure Injury (Stage 3,4, Unstageable, DTI, NWPT, Complex wounds)and New or Established Ostomies:  No      Primary Nurse eSignature: Electronically signed by Gretchen Encinas RN on 8/13/22 at 6:13 PM EDT

## 2022-08-13 NOTE — ED PROVIDER NOTES
CHIEF COMPLAINT  Fall (Pt to ED via EMS from home with c/o fall this morning around 0100. Pt was getting out of bed to look for something and fell. Pt with hx of stroke with right side deceits at baseline. Fall was seen on a camera at home, looked like pt braced himself on his arm going down, did not hit head. Pt was on the ground until someone found him this morning. Pt arrives alert and oriented x4. + blood thinners. No dizziness or chest pain )      HISTORY OF PRESENT ILLNESS  Tracy Shown is a 82 y.o. male with a history of atrial fibrillation, CHF, hypertension, and thyroid disease who presents to the ED complaining of fall. Patient lives at home by himself. He was found lying on the floor this morning by his son. Patient states he does not recall how he ended up on the floor. He further denies any pain or injury. No complaints at this time. .       Addendum: On further discussion with the patient's son, a camera had been on in the room. Patient clearly syncopized per review of film. No other complaints, modifying factors or associated symptoms. I have reviewed the following from the nursing documentation. Past Medical History:   Diagnosis Date    Atrial fibrillation (HCC)     CHF (congestive heart failure) ([facility] [address]     Hypertension     Thyroid disease      Past Surgical History:   Procedure Laterality Date    CARPAL TUNNEL RELEASE Bilateral     CYSTOSCOPY      LUMBAR SPINE SURGERY       Family History   Family history unknown: Yes     Social History     Socioeconomic History    Marital status:       Spouse name: Not on file    Number of children: Not on file    Years of education: Not on file    Highest education level: Not on file   Occupational History    Not on file   Tobacco Use    Smoking status: Former    Smokeless tobacco: Former   Substance and Sexual Activity    Alcohol use: Not Currently    Drug use: Not on file    Sexual activity: Not on file   Other Topics Concern    Not on file   Social History Narrative    Not on file     Social Determinants of Health     Financial Resource Strain: Not on file   Food Insecurity: Not on file   Transportation Needs: Not on file   Physical Activity: Not on file   Stress: Not on file   Social Connections: Not on file   Intimate Partner Violence: Not on file   Housing Stability: Not on file     No current facility-administered medications for this encounter. Current Outpatient Medications   Medication Sig Dispense Refill    dexamethasone (DECADRON) 6 MG tablet 6mg po qd x 3 days then 3mg po qd x 3 days then stop 6 tablet 0    apixaban (ELIQUIS) 2.5 MG TABS tablet Take 1 tablet by mouth 2 times daily 60 tablet 0    fluticasone-umeclidin-vilant (TRELEGY ELLIPTA) 100-62.5-25 MCG/INH AEPB Inhale 1 puff into the lungs daily      metFORMIN (GLUCOPHAGE-XR) 500 MG extended release tablet Take 500 mg by mouth 3 times daily      levothyroxine (SYNTHROID) 75 MCG tablet Take 75 mcg by mouth Daily      JANUVIA 50 MG tablet Take 50 mg by mouth daily       COMBIGAN 0.2-0.5 % ophthalmic solution Place 1 drop into both eyes every 12 hours       latanoprost (XALATAN) 0.005 % ophthalmic solution Place 1 drop into both eyes daily       Multiple Vitamin (MULTIVITAMIN) tablet Take 1 tablet by mouth daily      triamcinolone (KENALOG) 0.1 % ointment        Allergies   Allergen Reactions    Naproxen Rash       REVIEW OF SYSTEMS  10 systems reviewed, pertinent positives per HPI otherwise noted to be negative. PHYSICAL EXAM  BP (!) 165/70   Pulse 74   Temp 98 °F (36.7 °C) (Oral)   Resp 23   Ht 5' 4\" (1.626 m)   Wt 138 lb (62.6 kg)   SpO2 95%   BMI 23.69 kg/m²   GENERAL APPEARANCE: Awake and alert. Cooperative. No acute distress. HEAD: Normocephalic. Atraumatic. EYES: PERRL. EOM's grossly intact. Test skew within normal limits. .  ENT: Mucous membranes are moist.   NECK: Supple, trachea midline. Spine: No midline tenderness of cervical, thoracic, or lumbar spine. No step-offs or crepitus. HEART: RRR. Normal S1, S2. No murmurs, rubs or gallops. LUNGS: Respirations unlabored. CTAB. Good air exchange. No wheezes, rales, or rhonchi. Speaking comfortably in full sentences. ABDOMEN: Soft. Non-distended. Non-tender. No guarding or rebound. Normal Bowel sounds. EXTREMITIES: No peripheral edema. MAEE. No acute deformities. SKIN: Warm and dry. No acute rashes. NEUROLOGICAL: Alert and oriented X 3. CN II-XII intact. No gross facial drooping. Strength 5/5, sensation intact. No pronator drift. Normal coordination. Gait normal.  NIH 0.  PSYCHIATRIC: Normal mood and affect. LABS  I have reviewed all labs for this visit.    Results for orders placed or performed during the hospital encounter of 08/13/22   CBC with Auto Differential   Result Value Ref Range    WBC 11.8 (H) 4.0 - 11.0 K/uL    RBC 3.58 (L) 4.20 - 5.90 M/uL    Hemoglobin 10.4 (L) 13.5 - 17.5 g/dL    Hematocrit 31.9 (L) 40.5 - 52.5 %    MCV 89.0 80.0 - 100.0 fL    MCH 29.0 26.0 - 34.0 pg    MCHC 32.6 31.0 - 36.0 g/dL    RDW 16.6 (H) 12.4 - 15.4 %    Platelets 166 274 - 370 K/uL    MPV 8.0 5.0 - 10.5 fL    Neutrophils % 80.7 %    Lymphocytes % 7.6 %    Monocytes % 10.1 %    Eosinophils % 1.4 %    Basophils % 0.2 %    Neutrophils Absolute 9.5 (H) 1.7 - 7.7 K/uL    Lymphocytes Absolute 0.9 (L) 1.0 - 5.1 K/uL    Monocytes Absolute 1.2 0.0 - 1.3 K/uL    Eosinophils Absolute 0.2 0.0 - 0.6 K/uL    Basophils Absolute 0.0 0.0 - 0.2 K/uL   EKG 12 Lead   Result Value Ref Range    Ventricular Rate 71 BPM    Atrial Rate 71 BPM    P-R Interval 206 ms    QRS Duration 88 ms    Q-T Interval 446 ms    QTc Calculation (Bazett) 484 ms    P Axis 71 degrees    R Axis 48 degrees    T Axis 54 degrees    Diagnosis       Normal sinus rhythmProlonged QTAbnormal ECGWhen compared with ECG of 28-SEP-2021 15:37,Previous ECG has undetermined rhythm, needs reviewQT has lengthened       EKG  The Ekg interpreted by myself    Normal sinus rhythm with a rate of 71. Normal axis. QTC prolonged at 484. Otherwise normal intervals and durations. QTC has prolonged significantly since previous EKG on September 28, 2021. RADIOLOGY  X-RAYS:  I have reviewed radiologic plain film image(s). ALL OTHER NON-PLAIN FILM IMAGES SUCH AS CT, ULTRASOUND AND MRI HAVE BEEN READ BY THE RADIOLOGIST. CT HEAD WO CONTRAST   Final Result   1. Acute intraventricular hemorrhage in the left lateral ventricle. 2. No other areas of intracranial hemorrhage. 3. Atrophy and chronic small vessel ischemic changes are chronic. 4. Probable remote left orbital for fracture. Correlate with history. No   evidence of acute fracture or hemorrhage in the left visualized maxillary   sinus. Findings were discussed with Mika TOM at 10:52 am on 8/13/2022. Rechecks: Physical assessment performed. Critical Care: Critical care 35 minutes was completed on patient in addition to and excluding any procedures noted. Is this patient to be included in the SEP-1 Core Measure? No   Exclusion criteria - the patient is NOT to be included for SEP-1 Core Measure due to: Infection is not suspected    ED COURSE/MDM  Patient seen and evaluated. Old records reviewed. Labs and imaging reviewed and results discussed with patient. Patient was given Kcentra. He remained stable throughout stay in the emergency department. Patient was reassessed as noted above . Patient's labs reveal elevated troponin and TAYLER. CT imaging is concerning for intraventricular hemorrhage. Additionally, imaging report was concerning for potential old orbital fracture. This was discussed with patient and consistent with previous remote injury. Neurosurgery and hospitalist were contacted and patient will be transferred to [facility]. for further evaluation and treatment. No acute pathology was noted and pt is safe for discharge home to follow up with.  Plan of care discussed with patient and

## 2022-08-14 LAB
ANION GAP SERPL CALCULATED.3IONS-SCNC: 11 MMOL/L (ref 3–16)
BUN BLDV-MCNC: 30 MG/DL (ref 7–20)
CALCIUM SERPL-MCNC: 8.6 MG/DL (ref 8.3–10.6)
CHLORIDE BLD-SCNC: 103 MMOL/L (ref 99–110)
CO2: 25 MMOL/L (ref 21–32)
CREAT SERPL-MCNC: 1.5 MG/DL (ref 0.8–1.3)
GFR AFRICAN AMERICAN: 54
GFR NON-AFRICAN AMERICAN: 44
GLUCOSE BLD-MCNC: 149 MG/DL (ref 70–99)
GLUCOSE BLD-MCNC: 166 MG/DL (ref 70–99)
GLUCOSE BLD-MCNC: 189 MG/DL (ref 70–99)
GLUCOSE BLD-MCNC: 195 MG/DL (ref 70–99)
GLUCOSE BLD-MCNC: 396 MG/DL (ref 70–99)
HCT VFR BLD CALC: 31.2 % (ref 40.5–52.5)
HEMOGLOBIN: 10.2 G/DL (ref 13.5–17.5)
MCH RBC QN AUTO: 29.3 PG (ref 26–34)
MCHC RBC AUTO-ENTMCNC: 32.8 G/DL (ref 31–36)
MCV RBC AUTO: 89.3 FL (ref 80–100)
PDW BLD-RTO: 16.4 % (ref 12.4–15.4)
PERFORMED ON: ABNORMAL
PLATELET # BLD: 267 K/UL (ref 135–450)
PMV BLD AUTO: 8.2 FL (ref 5–10.5)
POTASSIUM SERPL-SCNC: 3.6 MMOL/L (ref 3.5–5.1)
RBC # BLD: 3.49 M/UL (ref 4.2–5.9)
SODIUM BLD-SCNC: 139 MMOL/L (ref 136–145)
WBC # BLD: 10.6 K/UL (ref 4–11)

## 2022-08-14 PROCEDURE — 97166 OT EVAL MOD COMPLEX 45 MIN: CPT

## 2022-08-14 PROCEDURE — 83036 HEMOGLOBIN GLYCOSYLATED A1C: CPT

## 2022-08-14 PROCEDURE — 2500000003 HC RX 250 WO HCPCS

## 2022-08-14 PROCEDURE — 97530 THERAPEUTIC ACTIVITIES: CPT

## 2022-08-14 PROCEDURE — 85027 COMPLETE CBC AUTOMATED: CPT

## 2022-08-14 PROCEDURE — 80048 BASIC METABOLIC PNL TOTAL CA: CPT

## 2022-08-14 PROCEDURE — 2500000003 HC RX 250 WO HCPCS: Performed by: STUDENT IN AN ORGANIZED HEALTH CARE EDUCATION/TRAINING PROGRAM

## 2022-08-14 PROCEDURE — 94761 N-INVAS EAR/PLS OXIMETRY MLT: CPT

## 2022-08-14 PROCEDURE — 99223 1ST HOSP IP/OBS HIGH 75: CPT | Performed by: INTERNAL MEDICINE

## 2022-08-14 PROCEDURE — 1200000000 HC SEMI PRIVATE

## 2022-08-14 PROCEDURE — 6370000000 HC RX 637 (ALT 250 FOR IP)

## 2022-08-14 PROCEDURE — 36415 COLL VENOUS BLD VENIPUNCTURE: CPT

## 2022-08-14 PROCEDURE — 97535 SELF CARE MNGMENT TRAINING: CPT

## 2022-08-14 PROCEDURE — 97161 PT EVAL LOW COMPLEX 20 MIN: CPT

## 2022-08-14 PROCEDURE — 6360000002 HC RX W HCPCS

## 2022-08-14 PROCEDURE — 94640 AIRWAY INHALATION TREATMENT: CPT

## 2022-08-14 PROCEDURE — 97116 GAIT TRAINING THERAPY: CPT

## 2022-08-14 PROCEDURE — 2700000000 HC OXYGEN THERAPY PER DAY

## 2022-08-14 RX ORDER — METOPROLOL TARTRATE 5 MG/5ML
2.5 INJECTION INTRAVENOUS ONCE
Status: COMPLETED | OUTPATIENT
Start: 2022-08-14 | End: 2022-08-14

## 2022-08-14 RX ADMIN — LABETALOL HYDROCHLORIDE 5 MG: 5 INJECTION, SOLUTION INTRAVENOUS at 22:13

## 2022-08-14 RX ADMIN — LEVOTHYROXINE SODIUM 75 MCG: 0.07 TABLET ORAL at 10:13

## 2022-08-14 RX ADMIN — IPRATROPIUM BROMIDE 0.5 MG: 0.5 SOLUTION RESPIRATORY (INHALATION) at 16:17

## 2022-08-14 RX ADMIN — METOPROLOL TARTRATE 2.5 MG: 5 INJECTION INTRAVENOUS at 10:13

## 2022-08-14 RX ADMIN — IPRATROPIUM BROMIDE 0.5 MG: 0.5 SOLUTION RESPIRATORY (INHALATION) at 12:51

## 2022-08-14 RX ADMIN — INSULIN LISPRO 4 UNITS: 100 INJECTION, SOLUTION INTRAVENOUS; SUBCUTANEOUS at 13:33

## 2022-08-14 ASSESSMENT — ENCOUNTER SYMPTOMS
CONSTIPATION: 0
SHORTNESS OF BREATH: 0
CHEST TIGHTNESS: 0
ABDOMINAL PAIN: 0
DIARRHEA: 0
VOMITING: 0
NAUSEA: 0

## 2022-08-14 NOTE — CONSULTS
Neurosurgery Consult:    Patient Name: Edgar Jauregui YOB: 1935   Sex: Male Age: 82 yrs     Medical Record Number: [id number] Acct Number: [de-identified]   Room Number: 2498/2239-21 Hospital Day: Hospital Day: 2     Requesting physician: Dallas Alanis MD    Reason for consultation: fall, IVH    History of present illness: Patient is a 82 y.o. male on Eliquis  has a past medical history of Atrial fibrillation ([facility] [address] CHF (congestive heart failure) ([facility] [address] Hypertension, and Thyroid disease. He suffered a fall and was brought to outside ER and head CT showed small amount of L ventricular IVH. Transferred to [facility] for further care. Was on eliquis and reversal was recommended. ROS:   Denies fever or recent illness. Denies chest pain  Denies shortness of breath  Denies changes in bowel or bladder function    VITAL SIGNS   BP (!) 156/57   Pulse 73   Temp 98.4 °F (36.9 °C) (Temporal)   Resp 21   Ht 5' 4\" (1.626 m)   Wt 138 lb 14.2 oz (63 kg)   SpO2 95%   BMI 23.84 kg/m²    Height Height: 5' 4\" (162.6 cm)   Weight Weight: 138 lb 14.2 oz (63 kg)        Allergies Allergies   Allergen Reactions    Naproxen Rash      NPO Status ADULT DIET; Regular; 4 carb choices (60 gm/meal)   Isolation No active isolations     MEDICAL HISTORY   Past Medical History       Diagnosis Date    Atrial fibrillation (HCC)     CHF (congestive heart failure) ([facility] [address]     Hypertension     Thyroid disease       Surgical History    has a past surgical history that includes Carpal tunnel release (Bilateral); Lumbar spine surgery; and Cystoscopy. Social History   Social History     Occupational History    Not on file   Tobacco Use    Smoking status: Former    Smokeless tobacco: Former   Substance and Sexual Activity    Alcohol use: Not Currently    Drug use: Not on file    Sexual activity: Not on file        The medical history was obtained from the patient & the medical records.  The nursing notes, primary physician's notes, and old charts (if applicable) were reviewed. LABS   Basic Metabolic Profile Recent Labs     08/13/22  0852 08/13/22  1037 08/14/22  0543   * 134* 139   CL 92* 99 103   CO2 28 28 25   BUN 36* 33* 30*   CREATININE 1.7* 1.4* 1.5*   GLUCOSE 238* 180* 149*      Complete Blood Count Recent Labs     08/13/22  0852 08/14/22  0543   WBC 11.8* 10.6   RBC 3.58* 3.49*      Coagulation Studies No results for input(s): PTT, INR in the last 72 hours. Invalid input(s): PLATELETS, PROA, PT, PTTA     MEDICATIONS   Inpatient Medications     budesonide, 0.5 mg, Nebulization, BID    ipratropium, 0.5 mg, Nebulization, 4x daily    Arformoterol Tartrate, 15 mcg, Nebulization, BID    insulin lispro, 0-4 Units, SubCUTAneous, TID WC    insulin lispro, 0-4 Units, SubCUTAneous, Nightly    levothyroxine, 75 mcg, Oral, Daily   Infusions    dextrose        PRN   acetaminophen, 650 mg, Oral, Q4H PRN  ondansetron, 4 mg, Oral, Q8H PRN   Or  ondansetron, 4 mg, IntraVENous, Q6H PRN  glucose, 4 tablet, Oral, PRN  dextrose bolus, 125 mL, IntraVENous, PRN   Or  dextrose bolus, 250 mL, IntraVENous, PRN  glucagon (rDNA), 1 mg, SubCUTAneous, PRN  dextrose, , IntraVENous, Continuous PRN  labetalol, 5 mg, IntraVENous, Q4H PRN       Antibiotics   Recent Abx Admin        No antibiotic orders with administrations found.                        PHYSICAL EXAMINATION     GENERAL: NAD, alert, appears stated age, and cooperative  HEENT: Normocephalic, PERRL, EOMI, neck supple, trachea midline, lips without lesions  RESP: Easy WOB, symmetric chest rise  EXTREMITIES: Extremities WWP  SKIN: Warm and dry  NEURO: A&Ox4, FC - appendicular   CN II-XII grossly intact with exception of decreased hearing: no facial droop, EOMI, tongue midline, voice wnl,     NASH spontaneously, no drift   Tone normal throughout   Sensation intact to light touch throughout   Gait exam deferred 2/2 patient condition    IMAGING   I personally reviewed the patient's imaging which consists of head CT x2 which shows small multifocal areas of IVH within left lateral ventricle, stable on repeat imaging. ASSESSMENT & PLAN   83yo M s/p fall on eliquis with small, multifocal IVH, GCS 15    Plan:  Exam stable. Ok to transfer to floor  Goal SBP <160  No further imaging needed  Hold all anticoagulation x 2 weeks  Ok for [facility] for DVT proph. Thank you for the consultation.     Yeny Toney. [address]

## 2022-08-14 NOTE — CONSULTS
ICU HISTORY AND PHYSICAL       Hospital Day: 1  ICU Day:  1                                                        Code:Full Code  Admit Date: 8/13/2022  PCP: Maximo Mary MD                                  CC: Intraventricular hemorrhage    HISTORY OF PRESENT ILLNESS:     82yoM with hx HTN, HLD, CHF, AF (Eliquis) and CVA early this year who initially presented to [facility] for [address]. Per son, cameras were installed after previous CVA which showed pt attempting to ambulate w/ walker around 0148 this morning when he fell on his L side. No head injury. Pt denies recollection of events and states he first remembers being found by family around 0730. Son states pt was speaking normally and oriented at that time. At [facility], labs remarkable for elevated troponin and Crt. CT showed acute IVH to L lateral ventricle. Pt was transferred to [facility] for Nsgy management. Pt currently resting comfortably in NAD. States he has no recollection of initial events or reason for fall. Has mild difficulty finding words occasionally, but son states this is unchanged from previous CVA. He denies any headache, fever, chills, chest pain, dyspnea, abdominal pain, nausea, vomiting, visual changes, slurred speech, numbness, or weakness. PAST HISTORY:     Past Medical History:   Diagnosis Date    Atrial fibrillation (HCC)     CHF (congestive heart failure) (HCC)     Hypertension     Thyroid disease        Past Surgical History:   Procedure Laterality Date    CARPAL TUNNEL RELEASE Bilateral     CYSTOSCOPY      LUMBAR SPINE SURGERY         SocialHistory:   The patient lives at home with son    Alcohol:  Illicit drugs: no use  Tobacco:      Family History:  Family History   Family history unknown: Yes       MEDICATIONS:     No current facility-administered medications on file prior to encounter.      Current Outpatient Medications on File Prior to Encounter   Medication Sig Dispense Refill    carvedilol (COREG) 3.125 MG tablet Take 3.125 mg by mouth in the morning and 3.125 mg in the evening. Take with meals. Cholecalciferol (VITAMIN D3) 50 MCG (2000 UT) CAPS Take by mouth 2 times daily      valsartan (DIOVAN) 40 MG tablet Take 40 mg by mouth in the morning. dexamethasone (DECADRON) 6 MG tablet 6mg po qd x 3 days then 3mg po qd x 3 days then stop (Patient not taking: Reported on 8/13/2022) 6 tablet 0    apixaban (ELIQUIS) 2.5 MG TABS tablet Take 1 tablet by mouth 2 times daily 60 tablet 0    fluticasone-umeclidin-vilant (TRELEGY ELLIPTA) 100-62.5-25 MCG/INH AEPB Inhale 1 puff into the lungs daily      metFORMIN (GLUCOPHAGE-XR) 500 MG extended release tablet Take 500 mg by mouth 3 times daily (Patient not taking: Reported on 8/13/2022)      levothyroxine (SYNTHROID) 75 MCG tablet Take 75 mcg by mouth Daily      JANUVIA 50 MG tablet Take 50 mg by mouth daily       COMBIGAN 0.2-0.5 % ophthalmic solution Place 1 drop into both eyes every 12 hours       latanoprost (XALATAN) 0.005 % ophthalmic solution Place 1 drop into both eyes daily       Multiple Vitamin (MULTIVITAMIN) tablet Take 1 tablet by mouth daily      triamcinolone (KENALOG) 0.1 % ointment            Scheduled Meds:   budesonide  0.5 mg Nebulization BID    ipratropium  0.5 mg Nebulization 4x daily    Arformoterol Tartrate  15 mcg Nebulization BID    insulin lispro  0-4 Units SubCUTAneous TID WC    insulin lispro  0-4 Units SubCUTAneous Nightly    [START ON 8/14/2022] levothyroxine  75 mcg Oral Daily      Continuous Infusions:   dextrose       PRN Meds:acetaminophen, ondansetron **OR** ondansetron, glucose, dextrose bolus **OR** dextrose bolus, glucagon (rDNA), dextrose, labetalol    Allergies: Allergies   Allergen Reactions    Naproxen Rash       REVIEW OF SYSTEMS:       History obtained from patient, chart review    Review of Systems  Constitutional:  Negative for chills, diaphoresis and fever. Eyes:  Negative for visual disturbance.   Respiratory:  Negative for chest tightness and shortness of breath. Cardiovascular:  Negative for chest pain. Gastrointestinal:  Negative for abdominal pain, nausea and vomiting. Musculoskeletal:  Negative for back pain. Neurological:  Negative for dizziness, facial asymmetry, speech difficulty, weakness, light-headedness, numbness and headaches. All other systems reviewed and are negative. PHYSICAL EXAM:       Vitals: BP (!) 137/58   Pulse 70   Temp 98.4 °F (36.9 °C) (Oral) Comment: ICU Team called to bedside  Resp 17   Ht 5' 4\" (1.626 m)   Wt 138 lb 14.2 oz (63 kg)   SpO2 92%   BMI 23.84 kg/m²     I/O:    Intake/Output Summary (Last 24 hours) at 8/13/2022 2004  Last data filed at 8/13/2022 1817  Gross per 24 hour   Intake 150 ml   Output 400 ml   Net -250 ml     No intake/output data recorded. I/O last 3 completed shifts: In: 150 [P.O.:120; I.V.:30]  Out: 400 [Urine:400]    Physical Examination:     Physical Exam  Vitals and nursing note reviewed. Constitutional:       General: He is not in acute distress. Appearance: Normal appearance. He is not ill-appearing. HENT:     Mouth/Throat:     Mouth: Mucous membranes are moist.     Pharynx: Oropharynx is clear. Cardiovascular:     Rate and Rhythm: Normal rate. Rhythm irregular. Pulses: Normal pulses. Heart sounds: No murmur heard. No friction rub. No gallop. Pulmonary:     Effort: Pulmonary effort is normal. No respiratory distress. Breath sounds: Normal breath sounds. No wheezing, rhonchi or rales. Abdominal:     General: Abdomen is flat. There is no distension. Palpations: Abdomen is soft. Tenderness: There is no abdominal tenderness. Skin:     General: Skin is warm and dry. Coloration: Skin is not jaundiced or pale. Neurological:     General: No focal deficit present. Mental Status: He is alert and oriented to person, place, and time. Mental status is at baseline. Cranial Nerves: No cranial nerve deficit.      Sensory: No sensory deficit. Motor: No weakness. No results for input(s): PHART, CTJ4HRV, PO2ART in the last 72 hours. DATA:       Labs:  CBC:   Recent Labs     08/13/22  0852   WBC 11.8*   HGB 10.4*   HCT 31.9*          BMP:   Recent Labs     08/13/22  0852 08/13/22  1037   * 134*   K 3.9 3.6   CL 92* 99   CO2 28 28   BUN 36* 33*   CREATININE 1.7* 1.4*   GLUCOSE 238* 180*     LFT's:   Recent Labs     08/13/22  0852   AST 23   ALT 14   BILITOT <0.2   ALKPHOS 82     Troponin:   Recent Labs     08/13/22  0852 08/13/22  1037   TROPONINI 0.02* 0.03*     BNP:No results for input(s): BNP in the last 72 hours. ABGs: No results for input(s): PHART, AGT1BEM, PO2ART in the last 72 hours. INR: No results for input(s): INR in the last 72 hours. U/A:  Recent Labs     08/13/22  1003   COLORU Yellow   PHUR 7.0   WBCUA 0-2   RBCUA 0-2   CLARITYU Clear   SPECGRAV 1.015   LEUKOCYTESUR Negative   UROBILINOGEN 0.2   BILIRUBINUR Negative   BLOODU TRACE-LYSED*   GLUCOSEU 250*       CT HEAD WO CONTRAST   Final Result      Cerebral atrophy. Evidence of diffuse chronic small vessel white matter disease. Acute intraventricular hemorrhage in the left lateral ventricle, stable from the prior study. No other new acute intracranial findings. EKG:   Echo:  Micro:     ASSESSMENT AND PLAN:   Stevie Cline is a 82 y.o. male, hx HTN, HLD, CHF, AF (Eliquis) and CVA early this year presenting as transfer from [facility] for IVH. GLF captured on camera around 0148 on 8/13. Pt w/o memory of events. CT showed acute IVH to L lateral ventricle. No focal deficits. Intraventricular hemorrhage in the left ventricle:  Last known well yesterday evening. GLF at home at [address] showing acute IVH to L lateral ventricle. - q1 neuro checks  - Nsgy cx, f/u final recs  - Hold eliquis; received Kcentra at OSH  - Goal SBP <160, Labetalol prn  - Repeat head CT stable from prior  - Cx nsgy for any change in exam     2.    TAYLER  Crt 1.4 on admission, baseline 1.0-1.2  - Resume diet  - Trend daily     3. HTN  - Son reports pt on carvedilol, lasix at home  - F/u med rec  - PRN labetalol 5 mg PRN. 4. DM  - Hold home [facility]  - LDSS  - Hypoglycemia protocol      Code Status:Full Code  FEN: regular diet  PPX:  None  DISPO: ICU    This patient has been staffed and discussed with Dr. Nikia Mahajan MD.   -----------------------------  Andrews Weclh MD, PGY-2  8/13/2022  8:04 PM     Pulm/CC    Patient seen and examined. I agree with Dr. Clyde Sanchze's history, physical, lab findings, assessment and plan. Pt with Hx CVA, AFib on Eliquis fell and was found ot have [address] yesterday. Repeat head CT stable and pt A&O x 4. Eliquis held and reversed with KCcentra. NSurg evaluated patient and no intervention needed. Also has TAYLER. Follow UOP and creatinine. Did not get CT contrast. Hold diovan.  Encourage fluids    Ok to TXF to 5 T with q 4 hr neurochecks    Ger Mariee MD

## 2022-08-14 NOTE — PROGRESS NOTES
Hospitalist Progress Note      PCP: Ayaz Roy MD    Chief Complaint. Presented to hospital for [address]    Date of Admission: 8/13/2022      Subjective:   denies chest pain, nausea, vomiting, shortness of breath, fever or chills. mention feels overall better    Medications:  Reviewed    Infusion Medications    dextrose       Scheduled Medications    budesonide  0.5 mg Nebulization BID    ipratropium  0.5 mg Nebulization 4x daily    Arformoterol Tartrate  15 mcg Nebulization BID    insulin lispro  0-4 Units SubCUTAneous TID     insulin lispro  0-4 Units SubCUTAneous Nightly    levothyroxine  75 mcg Oral Daily     PRN Meds: acetaminophen, ondansetron **OR** ondansetron, glucose, dextrose bolus **OR** dextrose bolus, glucagon (rDNA), dextrose, labetalol      Intake/Output Summary (Last 24 hours) at 8/14/2022 1730  Last data filed at 8/14/2022 1605  Gross per 24 hour   Intake 150 ml   Output 900 ml   Net -750 ml       Physical Exam Performed:    BP (!) 175/64   Pulse 80   Temp 98.4 °F (36.9 °C) (Oral)   Resp 23   Ht 5' 4\" (1.626 m)   Wt 138 lb 14.2 oz (63 kg)   SpO2 99%   BMI 23.84 kg/m²     General appearance: No apparent distress,   HEENT:  Conjunctivae/corneas clear. Neck: Supple, with full range of motion. Respiratory:  Normal respiratory effort. Clear to auscultation, bilaterally without Rales/Wheezes/Rhonchi. Cardiovascular: Regular rate and rhythm with normal S1/S2 without murmurs or rubs  Abdomen: Soft, non-tender, non-distended, normal bowel sounds. Musculoskeletal: No cyanosis or edema bilaterally  Neurologic:  without any focal sensory/motor deficits.  grossly non-focal.  Psychiatric: Alert and oriented, Normal mood  Peripheral Pulses: +2 palpable, equal bilaterally       Labs:   Recent Labs     08/13/22  0852 08/14/22  0543   WBC 11.8* 10.6   HGB 10.4* 10.2*   HCT 31.9* 31.2*    267     Recent Labs     08/13/22  0852 08/13/22  1037 08/14/22  0543   * 134* 139   K 3.9 3.6 3.6   CL 92* 99 103   CO2 28 28 25   BUN 36* 33* 30*   CREATININE 1.7* 1.4* 1.5*   CALCIUM 9.9 8.3 8.6     Recent Labs     08/13/22  0852   AST 23   ALT 14   BILITOT <0.2   ALKPHOS 82     No results for input(s): INR in the last 72 hours. Recent Labs     08/13/22  0852 08/13/22  1037 08/13/22  2155   CKTOTAL 132  --   --    TROPONINI 0.02* 0.03* 0.02*       Urinalysis:      Lab Results   Component Value Date/Time    NITRU Negative 08/13/2022 10:03 AM    WBCUA 0-2 08/13/2022 10:03 AM    RBCUA 0-2 08/13/2022 10:03 AM    BLOODU TRACE-LYSED 08/13/2022 10:03 AM    SPECGRAV 1.015 08/13/2022 10:03 AM    GLUCOSEU 250 08/13/2022 10:03 AM       Radiology:  CT HEAD WO CONTRAST   Final Result      Cerebral atrophy. Evidence of diffuse chronic small vessel white matter disease. Acute intraventricular hemorrhage in the left lateral ventricle, stable from the prior study. No other new acute intracranial findings. Assessment/Plan:    Active Hospital Problems    Diagnosis     Brain bleed ([facility] [address] [I61.9]      Priority: Medium    Acute intracranial hemorrhage (HCC) [I62.9]      Priority: Medium     Intracranial hemorrhage after fall on eliquis- repeat CT head, NS consulted - repeat CT stable, no AP/AC  Eliquis reversed with K centra    TAYLER - monitor    Hyperglycemia - DM - ISS, diabetic dit    HTN - stable    DVT Prophylaxis:  Diet: ADULT DIET;  Regular; 4 carb choices (60 gm/meal)  Code Status: Full Code    PT/OT Eval Status: ordered    Dispo/Plan of care -  ok to transfer to floor, PT/OT consult    Continue to hold Lázaro Houston MD

## 2022-08-14 NOTE — PLAN OF CARE
Problem: Discharge Planning  Goal: Discharge to home or other facility with appropriate resources  Outcome: Progressing  Flowsheets (Taken 8/13/2022 2000)  Discharge to home or other facility with appropriate resources:   Arrange for needed discharge resources and transportation as appropriate   Identify discharge learning needs (meds, wound care, etc)   Arrange for interpreters to assist at discharge as needed   Refer to discharge planning if patient needs post-hospital services based on physician order or complex needs related to functional status, cognitive ability or social support system   Identify barriers to discharge with patient and caregiver     Problem: Safety - Adult  Goal: Free from fall injury  Outcome: Progressing  Flowsheets (Taken 8/14/2022 0701)  Free From Fall Injury:   Instruct family/caregiver on patient safety   Based on caregiver fall risk screen, instruct family/caregiver to ask for assistance with transferring infant if caregiver noted to have fall risk factors     Problem: ABCDS Injury Assessment  Goal: Absence of physical injury  Outcome: Progressing  Flowsheets (Taken 8/14/2022 0701)  Absence of Physical Injury: Implement safety measures based on patient assessment     Problem: Skin/Tissue Integrity  Goal: Absence of new skin breakdown  Description: 1. Monitor for areas of redness and/or skin breakdown  2. Assess vascular access sites hourly  3. Every 4-6 hours minimum:  Change oxygen saturation probe site  4. Every 4-6 hours:  If on nasal continuous positive airway pressure, respiratory therapy assess nares and determine need for appliance change or resting period.   Outcome: Progressing

## 2022-08-14 NOTE — PROGRESS NOTES
ICU Progress Note    Admit Date: 8/13/2022  IV Access:Peripheral  IV Fluids:None  Vasopressors:None                Antibiotics: None  Diet: ADULT DIET; Regular; 4 carb choices (60 gm/meal)    CC: GLF    Interval history:   NAEO. Pt reports sleeping well; denies any headache, chest pain, dizziness, numbness, visual changes or speech difficulty. HPI:   82yoM with hx HTN, HLD, CHF, AF (Eliquis) and CVA early this year who initially presented to [facility] for GLF. Per son, cameras were installed after previous CVA which showed pt attempting to ambulate w/ walker around 0148 this morning when he fell on his L side. No head injury. Pt denies recollection of events and states he first remembers being found by family around 0730. Son states pt was speaking normally and oriented at that time. At [facility], labs remarkable for elevated troponin and Crt. CT showed acute IVH to L lateral ventricle. Pt was transferred to [place] [address] for Nsgy management.     Medications:     Scheduled Meds:   budesonide  0.5 mg Nebulization BID    ipratropium  0.5 mg Nebulization 4x daily    Arformoterol Tartrate  15 mcg Nebulization BID    insulin lispro  0-4 Units SubCUTAneous TID WC    insulin lispro  0-4 Units SubCUTAneous Nightly    levothyroxine  75 mcg Oral Daily     Continuous Infusions:   dextrose       PRN Meds:acetaminophen, ondansetron **OR** ondansetron, glucose, dextrose bolus **OR** dextrose bolus, glucagon (rDNA), dextrose, labetalol    Objective:   Vitals:   T-max:  Patient Vitals for the past 8 hrs:   BP Temp Temp src Pulse Resp SpO2   08/14/22 0600 (!) 161/59 -- -- 70 17 --   08/14/22 0500 (!) 151/68 -- -- 73 18 --   08/14/22 0400 (!) 113/50 98.4 °F (36.9 °C) Temporal 73 19 93 %   08/14/22 0309 (!) 150/68 -- -- 87 18 --   08/14/22 0200 (!) 124/49 -- -- 64 19 --   08/14/22 0100 (!) 139/53 -- -- 67 19 --   08/14/22 0000 (!) 154/53 97.7 °F (36.5 °C) Temporal 75 17 97 %       Intake/Output Summary (Last 24 hours) at 8/14/2022 6068  Last data filed at 8/14/2022 0000  Gross per 24 hour   Intake 150 ml   Output 800 ml   Net -650 ml       Review of Systems   Constitutional:  Negative for chills and fever. Eyes:  Negative for visual disturbance. Respiratory:  Negative for chest tightness and shortness of breath. Cardiovascular:  Negative for chest pain. Gastrointestinal:  Negative for abdominal pain, constipation, diarrhea, nausea and vomiting. Neurological:  Negative for dizziness, facial asymmetry, speech difficulty, weakness, light-headedness, numbness and headaches. All other systems reviewed and are negative. Physical Exam  Vitals and nursing note reviewed. Constitutional:       General: He is not in acute distress. Appearance: Normal appearance. He is not ill-appearing or diaphoretic. HENT:      Head: Normocephalic and atraumatic. Mouth/Throat:      Mouth: Mucous membranes are moist.      Pharynx: Oropharynx is clear. Cardiovascular:      Rate and Rhythm: Normal rate and regular rhythm. Pulses: Normal pulses. Heart sounds: No murmur heard. No friction rub. No gallop. Pulmonary:      Effort: Pulmonary effort is normal. No respiratory distress. Breath sounds: No wheezing, rhonchi or rales. Abdominal:      General: Abdomen is flat. There is no distension. Skin:     General: Skin is warm and dry. Coloration: Skin is not pale. Neurological:      General: No focal deficit present. Mental Status: He is alert and oriented to person, place, and time. Mental status is at baseline. Cranial Nerves: No cranial nerve deficit. Sensory: No sensory deficit. Motor: No weakness.          LABS:    CBC:   Recent Labs     08/13/22  0852 08/14/22  0543   WBC 11.8* 10.6   HGB 10.4* 10.2*   HCT 31.9* 31.2*    267   MCV 89.0 89.3     Renal:    Recent Labs     08/13/22  0852 08/13/22  1037 08/14/22  0543   * 134* 139   K 3.9 3.6 3.6   CL 92* 99 103   CO2 28 28 25   BUN 36* 33* 30*   CREATININE 1.7* 1.4* 1.5*   GLUCOSE 238* 180* 149*   CALCIUM 9.9 8.3 8.6   ANIONGAP 10 7 11     Hepatic:   Recent Labs     08/13/22  0852   AST 23   ALT 14   BILITOT <0.2   PROT 7.4   LABALBU 3.5   ALKPHOS 82     Troponin:   Recent Labs     08/13/22  0852 08/13/22  1037 08/13/22  2155   TROPONINI 0.02* 0.03* 0.02*     BNP: No results for input(s): BNP in the last 72 hours. Lipids: No results for input(s): CHOL, HDL in the last 72 hours. Invalid input(s): LDLCALCU, TRIGLYCERIDE  ABGs:  No results for input(s): PHART, MUI3IND, PO2ART, FUA2XAF, BEART, THGBART, X0XSPWZR, KCZ1THH in the last 72 hours. INR: No results for input(s): INR in the last 72 hours. Lactate: No results for input(s): LACTATE in the last 72 hours. Cultures:  -----------------------------------------------------------------  RAD:   CT HEAD WO CONTRAST   Final Result      Cerebral atrophy. Evidence of diffuse chronic small vessel white matter disease. Acute intraventricular hemorrhage in the left lateral ventricle, stable from the prior study. No other new acute intracranial findings. Assessment/Plan:   82yoM with hx HTN, HLD, CHF, AF (Eliquis) and CVA early this year presenting as transfer from [place] for IVH. GLF captured on camera around 0148 on 8/13. Pt w/o memory of events. CT showed acute IVH to L lateral ventricle. No focal deficits. IVH  Last known well yesterday evening. GLF [address] showing acute IVH to L lateral ventricle. - q1 neuro checks  - Nsgy cx, f/u recs  - Hold eliquis; received Kcentra at OSH  - Goal SBP <160, Labetalol prn  - Repeat head CT stable from prior  - Cx nsgy for any change in exam     2. TAYLER  Crt 1.4 on admission, baseline 1.0-1.2  - Resume diet  - Trend daily     3. HTN  - Son reports pt on carvedilol, lasix at home  - F/u med rec  - PRN labetalol     4.  DM  - Hold home [facility]  - LDSS  - Hypoglycemia protocol        Code Status:Full Code  FEN: NPO  PPX:  Held  DISPO: ICU    Bishnu Hull MD, PGY-1  08/14/22  7:08 AM    This patient has been staffed and discussed with Ranulfo Tse MD

## 2022-08-14 NOTE — PROGRESS NOTES
Physical Therapy  Facility/Department: [address] [place] [address] ICU  Physical Therapy Initial Assessment and treatment    Name: Jenny Allen  : 1935  MRN: [id number]  Date of Service: 2022    Discharge Recommendations:Brendan Cunningham scored a 19/24 on the AM-PAC short mobility form. Current research shows that an AM-PAC score of 18 or greater is typically associated with a discharge to the patient's home setting. Based on the patient's AM-PAC score and their current functional mobility deficits, it is recommended that the patient have 2-3 sessions per week of Physical Therapy at d/c to increase the patient's independence. At this time, this patient demonstrates the endurance and safety to discharge home with home services and a follow up treatment frequency of 2-3x/wk. Please see assessment section for further patient specific details. If patient discharges prior to next session this note will serve as a discharge summary. Please see below for the latest assessment towards goals. PT Equipment Recommendations  Equipment Needed: No (has [id number])      Patient Diagnosis(es): There were no encounter diagnoses. Past Medical History:  has a past medical history of Atrial fibrillation ([address] CHF (congestive heart failure) ([address] Hypertension, and Thyroid disease. Past Surgical History:  has a past surgical history that includes Carpal tunnel release (Bilateral); Lumbar spine surgery; and Cystoscopy. Assessment   Body Structures, Functions, Activity Limitations Requiring Skilled Therapeutic Intervention: Decreased functional mobility   Assessment: The pt is an 83 y/o male who presents following ICH. He appears to be close to baseline but rerports he feels more weak and with worse balance than normal. He was unable to tolerate much mobility on eval 2/2 high HR (150-180 bpm increasing with mobility) and SOB (SpO2 91-93% on room air throughout eval).   The pt does have 24 hr assist at home with a friend and has an aide who assists with bathing. Recommend HHPT upon discharge. Treatment Diagnosis: impaired mobility 2/2 ICH  Therapy Prognosis: Good  Decision Making: Low Complexity  Requires PT Follow-Up: Yes  Activity Tolerance  Activity Tolerance: Patient limited by endurance;Treatment limited secondary to medical complications  Activity Tolerance Comments: limited by SOB and high HR     Plan   Plan  Plan: 5-7 times per week  Current Treatment Recommendations: Strengthening, Balance training, Functional mobility training, Transfer training, Gait training, Stair training, Endurance training, Neuromuscular re-education, Safety education & training  Safety Devices  Type of Devices: Call light within reach, Chair alarm in place, Left in chair, Nurse notified     Restrictions  Position Activity Restriction  Other position/activity restrictions: PT/OT orders     Subjective   Pain: no pain  General  Chart Reviewed: Yes  Patient assessed for rehabilitation services?: Yes  Additional Pertinent Hx: Lucy Cruz is a 82 y.o. 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 Fall captured on camera around [address] [facility] on 8/13. Pt w/o memory of events. CT showed acute IVH to L lateral ventricle  Family / Caregiver Present: No  Referring Practitioner: Gisela Tomlinson MD  Diagnosis: ICH  Follows Commands: Within Functional Limits  General Comment  Comments: The pt presents sitting up in bed and eating breakfast; willing to work with therapy. Subjective  Subjective: \"I can't walk. \"         Social/Functional History  Social/Functional History  Lives With:  (a younger friend lives with him and helps him out 24/7)  Type of Home: House  Home Layout: Two level, Able to Live on Main level with bedroom/bathroom (friend lives on 2nd floor)  Home Access: Stairs to enter without rails  Entrance Stairs - Number of Steps: 1  Bathroom Shower/Tub: Walk-in shower, Shower chair with back  [employer]: Standard  Bathroom Equipment: Shower chair, Grab bars in shower, Grab bars around toilet  Home Equipment: Rollator, Reacher (says he has a cane but doesn't use)  Has the patient had two or more falls in the past year or any fall with injury in the past year?: Yes  ADL Assistance: Needs assistance (has a lady come in twice a week to help with showers)  Homemaking Assistance: Needs assistance (friend does all)  Homemaking Responsibilities: Yes  Ambulation Assistance: Independent (with 2AI)  Transfer Assistance: Independent  Active : No  Occupation: Retired  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  Vision/Hearing  Vision  Vision: Impaired  Vision Exceptions: Wears glasses at all times  Hearing  Hearing: Exceptions to [employer] [place] [employer] [facility]  Hearing Exceptions: Bilateral hearing aid    Cognition   Orientation  Overall Orientation Status: Within Normal Limits  Cognition  Overall Cognitive Status: WFL     Objective   Heart Rate: 73  Heart Rate Source: Monitor  BP: (!) 156/57  BP Location: Left upper arm  BP Method: Automatic  Patient Position: Semi fowlers  MAP (Calculated): 90  Resp: 21  SpO2: 95 %  O2 Device: Nasal cannula                 Strength RLE  Strength RLE: WFL  Strength LLE  Strength LLE: WFL           Bed mobility  Supine to Sit: Supervision (HOB slightly elevated)  Scooting: Supervision (to EOB)  Transfers  Sit to Stand: Contact guard assistance (from bed, from toilet)  Stand to sit: Contact guard assistance  Comment: cueing to keep walker in front of him for transfers  Ambulation  Surface: level tile  Device: Rolling Walker  Assistance: Contact guard assistance  Quality of Gait: slow basilio, decreased step length, height  Distance: 10' x 2  Comments: some WHITAKER, HR at rest 150s elevating to 180 with activity; RN aware.  On room air O2 with mobility 91%-93%  Stairs/Curb  Stairs?: No     Balance  Sitting - Static: Good  Sitting - Dynamic: Good  Standing - Static: Fair  Standing - Dynamic: Fair;-  Comments: Pt

## 2022-08-14 NOTE — PROGRESS NOTES
Occupational Therapy  Facility/Department: [facility] ICU  Occupational Therapy Initial Assessment/Treatment    Name: Hallie Sierra  : 1935  MRN: [id number]  Date of Service: 2022    Discharge Recommendations: Hallie Sierra scored a 20/24 on the AM-PAC ADL Inpatient form. Current research shows that an AM-PAC score of 18 or greater is typically associated with a discharge to the patient's home setting. Based on the patient's AM-PAC score, and their current ADL deficits, it is recommended that the patient have 2-3 sessions per week of Occupational Therapy at d/c to increase the patient's independence. At this time, this patient demonstrates the endurance and safety to discharge home with home and a follow up treatment frequency of 2-3x/wk. Please see assessment section for further patient specific details. If patient discharges prior to next session this note will serve as a discharge summary. Please see below for the latest assessment towards goals. OT Equipment Recommendations  Equipment Needed: No       Patient Diagnosis(es): There were no encounter diagnoses. Past Medical History:  has a past medical history of Atrial fibrillation ([address] CHF (congestive heart failure) ([address] Hypertension, and Thyroid disease. Past Surgical History:  has a past surgical history that includes Carpal tunnel release (Bilateral); Lumbar spine surgery; and Cystoscopy. Treatment Diagnosis: decreased ADLs, functional mobility and activity tolerance 2/2 ICH      Assessment   Performance deficits / Impairments: Decreased functional mobility ; Decreased endurance;Decreased ADL status; Decreased balance  Assessment: The pt is an 83 y/o male s/p ICH. Pt has 24 hr assist at home with a friend and has an aide who assists with bathing. He appears to be functioning close to baseline but reports his balance is a little worse and that he feels weaker.  He was unable to tolerate much activity 2/2 tachycardia demo a HR of 150-180 bpm increasing with mobility, RN present and aware. Pt will benefit from skilled OT during inpt stay and home health OT upon d/c to maximize functional independence. Treatment Diagnosis: decreased ADLs, functional mobility and activity tolerance 2/2 ICH  Prognosis: Good  Decision Making: Medium Complexity  REQUIRES OT FOLLOW-UP: Yes  Activity Tolerance  Activity Tolerance: Patient Tolerated treatment well;Treatment limited secondary to medical complications (free text)  Activity Tolerance Comments: He was unable to tolerate much mobility on eval 2/2 high HR (150-180 bpm increasing with mobility) and SOB (SpO2 91-93% on room air throughout eval); RN present and aware          Plan   Plan  Times per Week: 5-7  Current Treatment Recommendations: Strengthening, Balance training, Self-Care / ADL, Safety education & training, Functional mobility training, Endurance training, Neuromuscular re-education     Restrictions  Restrictions/Precautions  Restrictions/Precautions: Fall Risk  Position Activity Restriction  Other position/activity restrictions: up as tolerated    Subjective   General  Chart Reviewed: Yes  Patient assessed for rehabilitation services?: Yes  Additional Pertinent Hx: Pt is a 82 y.o. 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 Fall captured on camera around [address] on 8/13. Pt w/o memory of events. CT showed acute IVH to L lateral ventricle  Family / Caregiver Present: No  Referring Practitioner: Skyler Ryan MD  Diagnosis: brain bleed  Subjective  Subjective: Pt semi supine in bed upon arrival finishing breakfast, pleasant and agreeable to OT session. \"I can't get up or walk. \"       Social/Functional History  Social/Functional History  Lives With:  (a younger friend lives with him and helps him out 24/7)  Type of Home: House  Home Layout: Two level, Able to Live on Main level with bedroom/bathroom (friend lives on 2nd floor)  Home Access: Stairs to enter without rails  Entrance Stairs - Number of Steps: 1  Bathroom Shower/Tub: Walk-in shower, Shower chair with back  Bathroom Toilet: Standard  Bathroom Equipment: Shower chair, Grab bars in shower, Grab bars around toilet  Home Equipment: [address] (says he has a cane but doesn't use)  Has the patient had two or more falls in the past year or any fall with injury in the past year?: Yes  ADL Assistance: Needs assistance (has a lady come in twice a week to help with showers)  Homemaking Assistance: Needs assistance (friend does all)  Homemaking Responsibilities: Yes  Ambulation Assistance: Independent (with 7DV)  Transfer Assistance: Independent  Active : No  Occupation: Retired  Type of Occupation: factory  Leisure & Hobbies: shoot       Objective   Heart Rate: 73  [address]: Monitor  BP: (!) 156/57  BP Location: Left upper arm  BP Method: Automatic  Patient Position: Semi fowlers  MAP (Calculated): 90  Resp: 21  SpO2: 95 %  O2 Device: Nasal cannula             Safety Devices  Type of Devices: Call light within reach; Chair alarm in place; Left in chair;Nurse notified  Balance  Sitting:  (spvn dynamically during LB dressing unsupported)  Standing:  (CGA with RW)  Gait  Overall Level of Assistance: Contact-guard assistance (to/from bathroom using RW)  Assistive Device: Walker, rolling  Toilet Transfers  Toilet - Technique: Ambulating  Equipment Used: Standard toilet  Toilet Transfer: Contact guard assistance  Toilet Transfers Comments: VCs for hand placmenet to use GB on L when standing  AROM: Within functional limits  Strength: Within functional limits  Coordination: Within functional limits (impaired ability with thumb opposition to pinky bilaterally)  Tone: Normal  Sensation: Intact      ADL  Feeding: Independent  Feeding Skilled Clinical Factors: observed to eat breakfast and open packages without difficulty  LE Dressing: Contact guard assistance; Increased time to complete  LE Dressing Skilled Clinical Factors: pt doffed soiled underwear and pants seated on toilet via trunk flexion, donned briefs and pants with ++ time to thread RLE and VCs for improved technique  Toileting: Contact guard assistance  Toileting Skilled Clinical Factors: pt continent of bladder, perihygiene seated with setup of wipes; LB clothing mgmt on/off hips in stance with CGA and no overt LOB         Activity Tolerance  Activity Tolerance: Patient limited by endurance;Treatment limited secondary to medical complications  Activity Tolerance Comments: limited by SOB and high HR  Bed mobility  Supine to Sit: Supervision (HOB slightly elevated)  Scooting: Supervision (to EOB and in recliner)  Transfers  Sit to stand: Contact guard assistance (from EOB)  Stand to sit: Contact guard assistance (VCs for hand placement)      Vision - Basic Assessment  Prior Vision: Wears glasses all the time  Vision  Vision: Impaired  Vision Exceptions: Wears glasses at all times  Hearing  Hearing: Exceptions to [facility]  Hearing Exceptions: Bilateral hearing aid  Cognition  Overall Cognitive Status: WFL  Orientation  Overall Orientation Status: Within Normal Limits                  Education Given To: Patient  Education Provided: Role of Therapy;Plan of Care;ADL Adaptive Strategies;Precautions;Transfer Training  Education Method: Demonstration;Verbal  Barriers to Learning: None  Education Outcome: Demonstrated understanding;Verbalized understanding                        G-Code     OutComes Score                                                  AM-PAC Score        AM-[facility] Inpatient Daily Activity Raw Score: 20 (08/14/22 1244)  AM-PAC Inpatient ADL T-Scale Score : 42.03 (08/14/22 1244)  ADL Inpatient CMS 0-100% Score: 38.32 (08/14/22 1244)  ADL Inpatient CMS G-Code Modifier : CJ (08/14/22 1244)    Tinneti Score       Goals  Short Term Goals  Time Frame for Short term goals: by d/c  Short Term Goal 1: Pt will complete LB dressing with SBA  Short Term Goal 2: Pt will tolerate 5 minutes in stance for ADL routine  Short Term Goal 3: Pt will complete 3 x 15 HEP to improve activity tolerance maintaining HR below 110  Patient Goals   Patient goals : to go home       Therapy Time   Individual Concurrent Group Co-treatment   Time In 0907         Time Out 1000         Minutes 53         Timed Code Treatment Minutes: [address] Alvin Sifuentes, OT

## 2022-08-14 NOTE — CONSULTS
Clinical Pharmacy Progress Note    Assessment / Plan    IVH - All IVs in Normal Saline  Drips will be adjusted to normal saline as appropriate based on compatibility, in an effort to avoid fluid shifts, as D5W is osmotically active. The following intermittent IV drips / infusions have been adjusted to saline:  None at this time  The following medications must remain in D5W due to incompatibility with normal saline:  Amiodarone Infusion  Amphotericin  Mycophenolate  Nitroprusside  Penicillin G Potassium  Note: Patient may have dextrose ordered as part of hypoglycemia treatment protocol. Total IV fluid delivered to patient over last 24 hrs: n/a  Pharmacist will follow daily to ensure all IVPBs / drips are in NS where possible. Please call with any questions.   Nay Lopez, FerD  PGY-1 Pharmacy Resident  [facility]  [id number]  8/14/2022   12:30 PM

## 2022-08-14 NOTE — PLAN OF CARE
Problem: Physical Therapy - Adult  Goal: By Discharge: Performs mobility at highest level of function for planned discharge setting. See evaluation for individualized goals.   Outcome: Progressing

## 2022-08-15 ENCOUNTER — APPOINTMENT (OUTPATIENT)
Dept: GENERAL RADIOLOGY | Age: 87
DRG: 086 | End: 2022-08-15
Attending: INTERNAL MEDICINE
Payer: MEDICARE

## 2022-08-15 VITALS
RESPIRATION RATE: 22 BRPM | SYSTOLIC BLOOD PRESSURE: 148 MMHG | WEIGHT: 138.23 LBS | DIASTOLIC BLOOD PRESSURE: 86 MMHG | BODY MASS INDEX: 23.6 KG/M2 | HEART RATE: 81 BPM | HEIGHT: 64 IN | OXYGEN SATURATION: 100 % | TEMPERATURE: 98.1 F

## 2022-08-15 LAB
ANION GAP SERPL CALCULATED.3IONS-SCNC: 11 MMOL/L (ref 3–16)
BUN BLDV-MCNC: 28 MG/DL (ref 7–20)
CALCIUM SERPL-MCNC: 8.6 MG/DL (ref 8.3–10.6)
CHLORIDE BLD-SCNC: 101 MMOL/L (ref 99–110)
CO2: 24 MMOL/L (ref 21–32)
CREAT SERPL-MCNC: 1.3 MG/DL (ref 0.8–1.3)
ESTIMATED AVERAGE GLUCOSE: 180 MG/DL
GFR AFRICAN AMERICAN: >60
GFR NON-AFRICAN AMERICAN: 52
GLUCOSE BLD-MCNC: 173 MG/DL (ref 70–99)
GLUCOSE BLD-MCNC: 197 MG/DL (ref 70–99)
GLUCOSE BLD-MCNC: 248 MG/DL (ref 70–99)
HBA1C MFR BLD: 7.9 %
HCT VFR BLD CALC: 30.6 % (ref 40.5–52.5)
HEMOGLOBIN: 10.1 G/DL (ref 13.5–17.5)
MCH RBC QN AUTO: 29.4 PG (ref 26–34)
MCHC RBC AUTO-ENTMCNC: 33.1 G/DL (ref 31–36)
MCV RBC AUTO: 88.6 FL (ref 80–100)
PDW BLD-RTO: 16.4 % (ref 12.4–15.4)
PERFORMED ON: ABNORMAL
PERFORMED ON: ABNORMAL
PLATELET # BLD: 262 K/UL (ref 135–450)
PMV BLD AUTO: 8.1 FL (ref 5–10.5)
POTASSIUM SERPL-SCNC: 3.9 MMOL/L (ref 3.5–5.1)
RBC # BLD: 3.46 M/UL (ref 4.2–5.9)
SODIUM BLD-SCNC: 136 MMOL/L (ref 136–145)
WBC # BLD: 10.4 K/UL (ref 4–11)

## 2022-08-15 PROCEDURE — 71045 X-RAY EXAM CHEST 1 VIEW: CPT

## 2022-08-15 PROCEDURE — 6360000002 HC RX W HCPCS: Performed by: STUDENT IN AN ORGANIZED HEALTH CARE EDUCATION/TRAINING PROGRAM

## 2022-08-15 PROCEDURE — 92526 ORAL FUNCTION THERAPY: CPT

## 2022-08-15 PROCEDURE — 6370000000 HC RX 637 (ALT 250 FOR IP)

## 2022-08-15 PROCEDURE — 6360000002 HC RX W HCPCS

## 2022-08-15 PROCEDURE — 80048 BASIC METABOLIC PNL TOTAL CA: CPT

## 2022-08-15 PROCEDURE — 6370000000 HC RX 637 (ALT 250 FOR IP): Performed by: INTERNAL MEDICINE

## 2022-08-15 PROCEDURE — 36415 COLL VENOUS BLD VENIPUNCTURE: CPT

## 2022-08-15 PROCEDURE — 94761 N-INVAS EAR/PLS OXIMETRY MLT: CPT

## 2022-08-15 PROCEDURE — 92610 EVALUATE SWALLOWING FUNCTION: CPT

## 2022-08-15 PROCEDURE — 85027 COMPLETE CBC AUTOMATED: CPT

## 2022-08-15 PROCEDURE — 94640 AIRWAY INHALATION TREATMENT: CPT

## 2022-08-15 PROCEDURE — 2700000000 HC OXYGEN THERAPY PER DAY

## 2022-08-15 RX ORDER — CEFUROXIME AXETIL 500 MG/1
500 TABLET ORAL EVERY 12 HOURS SCHEDULED
Qty: 20 TABLET | Refills: 0 | Status: SHIPPED | OUTPATIENT
Start: 2022-08-15 | End: 2022-08-25

## 2022-08-15 RX ORDER — METRONIDAZOLE 500 MG/1
500 TABLET ORAL EVERY 8 HOURS SCHEDULED
Qty: 30 TABLET | Refills: 0 | Status: ON HOLD
Start: 2022-08-15 | End: 2022-08-22 | Stop reason: HOSPADM

## 2022-08-15 RX ORDER — ENOXAPARIN SODIUM 100 MG/ML
30 INJECTION SUBCUTANEOUS DAILY
Status: DISCONTINUED | OUTPATIENT
Start: 2022-08-15 | End: 2022-08-15 | Stop reason: HOSPADM

## 2022-08-15 RX ORDER — CEFUROXIME AXETIL 250 MG/1
500 TABLET ORAL 2 TIMES DAILY
Status: DISCONTINUED | OUTPATIENT
Start: 2022-08-15 | End: 2022-08-15 | Stop reason: HOSPADM

## 2022-08-15 RX ORDER — POTASSIUM CHLORIDE 750 MG/1
10 CAPSULE, EXTENDED RELEASE ORAL DAILY
COMMUNITY

## 2022-08-15 RX ORDER — FUROSEMIDE 40 MG/1
40 TABLET ORAL DAILY
COMMUNITY

## 2022-08-15 RX ORDER — METRONIDAZOLE 500 MG/1
500 TABLET ORAL EVERY 8 HOURS SCHEDULED
Status: DISCONTINUED | OUTPATIENT
Start: 2022-08-15 | End: 2022-08-15 | Stop reason: HOSPADM

## 2022-08-15 RX ADMIN — LEVOTHYROXINE SODIUM 75 MCG: 0.07 TABLET ORAL at 06:35

## 2022-08-15 RX ADMIN — CEFUROXIME AXETIL 500 MG: 250 TABLET ORAL at 14:16

## 2022-08-15 RX ADMIN — IPRATROPIUM BROMIDE 0.5 MG: 0.5 SOLUTION RESPIRATORY (INHALATION) at 13:31

## 2022-08-15 RX ADMIN — INSULIN LISPRO 1 UNITS: 100 INJECTION, SOLUTION INTRAVENOUS; SUBCUTANEOUS at 12:32

## 2022-08-15 RX ADMIN — METRONIDAZOLE 500 MG: 500 TABLET ORAL at 14:16

## 2022-08-15 RX ADMIN — BUDESONIDE 500 MCG: 0.5 SUSPENSION RESPIRATORY (INHALATION) at 09:59

## 2022-08-15 RX ADMIN — IPRATROPIUM BROMIDE 0.5 MG: 0.5 SOLUTION RESPIRATORY (INHALATION) at 09:56

## 2022-08-15 RX ADMIN — ARFORMOTEROL TARTRATE 15 MCG: 15 SOLUTION RESPIRATORY (INHALATION) at 10:00

## 2022-08-15 ASSESSMENT — PAIN SCALES - GENERAL
PAINLEVEL_OUTOF10: 0

## 2022-08-15 NOTE — PLAN OF CARE
Problem: Safety - Adult  Goal: Free from fall injury  Outcome: Progressing  Flowsheets (Taken 8/15/2022 0000)  Free From Fall Injury: Instruct family/caregiver on patient safety     Problem: ABCDS Injury Assessment  Goal: Absence of physical injury  Outcome: Progressing  Flowsheets (Taken 8/15/2022 0000)  Absence of Physical Injury: Implement safety measures based on patient assessment     Problem: Pain  Goal: Verbalizes/displays adequate comfort level or baseline comfort level  Outcome: Progressing  Flowsheets (Taken 8/15/2022 0400)  Verbalizes/displays adequate comfort level or baseline comfort level: Encourage patient to monitor pain and request assistance     Problem: Neurosensory - Adult  Goal: Achieves stable or improved neurological status  Outcome: Progressing     Problem: Neurosensory - Adult  Goal: Absence of seizures  Outcome: Progressing

## 2022-08-15 NOTE — CARE COORDINATION
Case Management Assessment            Discharge Note                    Date / Time of Note: 8/15/2022 11:53 AM                  Discharge Note Completed by: Tennille Matthews RN    Patient Name: Anmol Yanes   YOB: 1935  Diagnosis: Brain bleed ([facility] [I61.9]  Acute intracranial hemorrhage ([facility] [I62.9]   Date / Time: 8/13/2022  4:12 PM    Current PCP: Yovani Robbins MD    Advance Directives:  Code Status: Full Code    Financial:  Payor: Danny Ponce / Plan: Selena Presley HMO / Product Type: Medicare /      Pharmacy:    [facility] [id number] [facility], [address] [place] [address] [phone] -  [phone]  [address] [place] [address]  Phone: [phone] Fax: [phone]      Assistance purchasing medications?: Potential Assistance Purchasing Medications: No    Does patient want to participate in local refill/ meds to beds program?:  yes    Meds To Beds General Rules:  1. Can ONLY be done Monday- Friday between 8:30am-5pm  2. Prescription(s) must be in pharmacy by 3pm to be filled same day  3. Copy of patient's insurance/ prescription drug card and patient face sheet must be sent along with the prescription(s)  4. Cost of Rx cannot be added to hospital bill. If financial assistance is needed, please contact unit  or ;  or  CANNOT provide pharmacy voucher for patients co-pays  5.  Patients can then  the prescription on their way out of the hospital at discharge, or pharmacy can deliver to the bedside if staff is available. (payment due at time of pick-up or delivery - cash, check, or card accepted)     Able to afford home medications/ co-pay costs: Yes    ADLS:  Current PT AM-PAC Score: 19 /24  Current OT AM-PAC Score: 20 /24      [address]GE [address]position: [address]: [address] [facility] referral   MICAH Completed: Yes    IMM Completed:   Yes, Case management has presented and reviewed IMM letter #2 to the patient and/or family/ POA. Patient and/or family/POA verbalized understanding of their medicare rights and appeal process if needed. Patient and/or family/POA has signed, initialed and placed today's date (8/15/2022) and time ([phone]) on IMM letter #2 on the the appropriate lines. Patient and/or family/POA, copy of letter offered and they are aware that this original copy of IMM letter #2 is available prior to discharge from the paper chart on the unit. Electronic documentation has been entered into epic for IMM letter #2 and original paper copy has been added to the paper chart at the nurses station. Transportation:  Transportation PLAN for discharge: family   Mode of Transport: [address] ordered at discharge: Yes  [address]: Arturo Hicks -  [address]  Phone: [phone] Fax: [phone]  Orders faxed: Yes    Durable Medical Equipment:  Has rolling walker    Home Oxygen and Respiratory Equipment:  Oxygen needed at discharge?: Yes  [address] [facility]: [facility]  Phone: [phone]   Portable tank available for discharge?: Yes    Additional CM Notes: Met with homer Cunningham and his friend at the bedside to discuss discharge plans. Mr. Melvi Monk will return to his house. He has a family friend who lives with him who can provide supervision as needed. He is relatively independent with all self care and functional mobility. His son is here to transport him home. The son broght oxygen and a rolling walker for the trip home.     The Plan for Transition of Care is related to the following treatment goals of Brain bleed [facility]) [I61.9]  Acute intracranial hemorrhage ([facility] [I62.9]    The Patient and/or patient representative Herman Boyd and his family were provided with a choice of provider and agrees with the discharge plan Yes    Freedom of choice list was provided with basic dialogue that supports the patient's individualized plan of care/goals and shares the quality data associated with the providers.  Yes      Molly Chambers RN  [facility]  Case Management Department  [phone]

## 2022-08-15 NOTE — DISCHARGE INSTR - COC
Continuity of Care Form    Patient Name: Tye Arthur   :  1935  MRN:  [id number]    Admit date:  2022  Discharge date:  ***    Code Status Order: Full Code   Advance Directives:     Admitting Physician:  Katherine Garcia MD  PCP: Misael Fields MD    Discharging Nurse: [facility] Unit/Room#: 4884/8867-33  Discharging Unit Phone Number: ***    Emergency Contact:   Extended Emergency Contact Information  Primary Emergency Contact: OctavioLyBraden   [address] Phone: [phone]  Relation: Child    Past Surgical History:  Past Surgical History:   Procedure Laterality Date    CARPAL TUNNEL RELEASE Bilateral     CYSTOSCOPY      LUMBAR SPINE SURGERY         Immunization History:   Immunization History   Administered Date(s) Administered    COVID-19, PFIZER PURPLE top, DILUTE for use, (age 15 y+), 30mcg/0.3mL 10/26/2021       Active Problems:  Patient Active Problem List   Diagnosis Code    Pneumonia due to COVID-19 virus U07.1, J12.82    Type 2 diabetes mellitus without complication (HCC) Z98.3    COPD (chronic obstructive pulmonary disease) ([facility] J44.9    Acquired hypothyroidism E03.9    Acute respiratory failure with hypoxia ([address] J96.01    Brain bleed ([facility] I61.9    Acute intracranial hemorrhage ([facility] I62.9       Isolation/Infection:   Isolation            No Isolation          Patient Infection Status       Infection Onset Added Last Indicated Last Indicated By Review Planned Expiration Resolved Resolved By    None active    Resolved    COVID-19 21 COVID-19   10/13/21     21 positive rapid at PCP office    C-diff Rule Out 20 Gastrointestinal Panel, Molecular (Ordered)   20 Rule-Out Test Resulted            Nurse Assessment:  Last Vital Signs: BP (!) 158/100   Pulse 68   Temp 98.1 °F (36.7 °C) (Oral)   Resp 15   Ht 5' 4\" (1.626 m)   Wt 138 lb 3.7 oz (62.7 kg)   SpO2 100%   BMI 23.73 kg/m² Last documented pain score (0-10 scale): Pain Level: 0  Last Weight:   Wt Readings from Last 1 Encounters:   08/15/22 138 lb 3.7 oz (62.7 kg)     Mental Status:  {IP PT MENTAL STATUS:}    IV Access:  { MICAH IV ACCESS:123253040}    Nursing Mobility/ADLs:  Walking   {CHP DME EBT}  Transfer  {CHP DME UGIB:804956316}  Bathing  {CHP DME XDNI:723674484}  Dressing  {CHP DME MHOM:787660305}  Toileting  {CHP DME SERY:277598766}  Feeding  {CHP DME ENQS:256083385}  Med Admin  {CHP DME EHZR:688853988}  Med Delivery   { MICAH MED Delivery:343047486}    Wound Care Documentation and Therapy:        Elimination:  Continence: Bowel: {YES / NN:29624}  Bladder: {YES / PV:37139}  Urinary Catheter: {Urinary Catheter:925371576}   Colostomy/Ileostomy/Ileal Conduit: {YES / SN:05675}       Date of Last BM: ***    Intake/Output Summary (Last 24 hours) at 8/15/2022 1115  Last data filed at 8/15/2022 0830  Gross per 24 hour   Intake 120 ml   Output 1050 ml   Net -930 ml     I/O last 3 completed shifts:   In: 120 [P.O.:120]  Out: 1150 [Urine:1150]    Safety Concerns:     [address] Safety Concerns:812285287}    Impairments/Disabilities:      [address] Impairments/Disabilities:962663560}    Nutrition Therapy:  Current Nutrition Therapy:   [address] Diet List:692812127}    Routes of Feeding: {CHP DME Other Feedings:795273574}  Liquids: {Slp liquid thickness:73872}  Daily Fluid Restriction: {CHP DME Yes amt example:091623477}  Last Modified Barium Swallow with Video (Video Swallowing Test): {Done Not Done QDNE:757153240}    Treatments at the Time of Hospital Discharge:   Respiratory Treatments: ***  Oxygen Therapy:  {Therapy; copd oxygen:26240}  Ventilator:    { CC Vent LMOJ:500939324}    Rehab Therapies: {THERAPEUTIC INTERVENTION:4880885293}  Weight Bearing Status/Restrictions: {[facility] Weight Bearin}  Other Medical Equipment (for information only, NOT a DME order):  {EQUIPMENT:170391981}  Other Treatments: ***    Patient's personal belongings (please select all that are sent with patient):  {CHP DME Belongings:652568660}    RN SIGNATURE:  {Esignature:246490547}    CASE MANAGEMENT/SOCIAL WORK SECTION    Inpatient Status Date: ***    Readmission Risk Assessment Score:  Readmission Risk              Risk of Unplanned Readmission:  15           Discharging to Facility/ [address] [place] [address] Maria  Phone: [phone] Fax: [phone]      / signature: Electronically signed by Ely Sigala RN on 8/15/22 at 11:57 AM EDT    PHYSICIAN SECTION    Prognosis: Fair    Condition at Discharge: Stable    Rehab Potential (if transferring to Rehab): Fair    Recommended Labs or Other Treatments After Discharge: follow up with your PCP in 1 week    Physician Certification: I certify the above information and transfer of Lars Zamorano  is necessary for the continuing treatment of the diagnosis listed and that he requires [address] for greater 30 days.      Update Admission H&P: No change in H&P    PHYSICIAN SIGNATURE:  Electronically signed by Tabitha Kelsey MD on 8/15/22 at 11:15 AM EDT

## 2022-08-15 NOTE — DISCHARGE INSTRUCTIONS
Follow up with neurosurgeron as soon as possible but not later than 2 weeks about when to resume your eliquis,

## 2022-08-15 NOTE — PROGRESS NOTES
Follow up with neurosurgeron as soon as possible but not later than 2 weeks about when to resume your eliquis,     Patient was informed

## 2022-08-15 NOTE — PROGRESS NOTES
Speech Language Pathology  [facility]   CLINICAL BEDSIDE SWALLOW EVALUATION/treat/speech screen    NAME: Tramaine Pardo  : 1935  MRN: [id number]    ADMISSION DATE: 2022  ADMITTING DIAGNOSIS: has Pneumonia due to COVID-19 virus; Type 2 diabetes mellitus without complication ([address] COPD (chronic obstructive pulmonary disease) ([address] Acquired hypothyroidism; Acute respiratory failure with hypoxia ([address] Brain bleed ([address] and Acute intracranial hemorrhage ([address] on their problem list.  ONSET DATE: 22    Recent Chest Xray not completed    CT of head 22  Cerebral atrophy. Evidence of diffuse chronic small vessel white matter disease. Acute intraventricular hemorrhage in the left lateral ventricle, stable from the prior study. No other new acute intracranial findings. Date of Eval: 8/15/2022  Evaluating Therapist: Tylor Leigh SLP    Current Diet level:  Current Diet : Regular    Primary Complaint  Patient Complaint: none regarding  speech or swallowing    Pain:  Pain Assessment  Pain Assessment: None - Denies Pain    Reason for Referral  Tramaine Pardo was referred for a bedside swallow evaluation to assess the efficiency of his swallow function, identify signs and symptoms of aspiration and make recommendations regarding safe dietary consistencies, effective compensatory strategies, and safe eating environment. History of present illness:   Sandra BOLTON notes:  \" Patient is a 82 y.o. male on Eliquis  has a past medical history of Atrial fibrillation ([address] CHF (congestive heart failure) ([address] Hypertension, and Thyroid disease. He suffered a fall and was brought to outside ER and head CT showed small amount of L ventricular IVH. Transferred to [facility] for further care. Was on eliquis and reversal was recommended. \"    Impression  Speech: Pt alert, oriented, follows commands and answered questions appropriately.   Speech is fluent, no dysarthria or word finding  noted. Per chart, pt has baseline mild word finding impairment. Cognition appears grossly intact. Will monitor need for full eval.  Family denies any changes  Dysphagia: Oral structures grossly intact, no asymmetry noted. Pt able to cough and swallow on command. Presented pt with puree, thin liquids via cup / straw, including 3 ounces of uninterrupted swallows, as well as a ruby cracker. Pt demonstrated no overt signs of aspiration: no coughing/throat clearing or change in vocal quality. Good labial seal with no anterior loss of liquids. Good swallow movement noted upon palpation of anterior neck. Pt exhibited no difficulty with mastication of cracker, no oral residue. No c/o globus sensation    Dysphagia Diagnosis: Swallow function appears WF  Dysphagia Outcome Severity Scale: Level 7: Normal in all situations     Treatment Plan  Requires SLP Intervention: Yes  D/C Recommendations: To be determined     Recommended Diet and Intervention  Cont regular diet/thin liquids -if any s/s of aspiration emerge, or there is respiratory decline,  make NPO until further evaluated by SLP    Recommended Form of Meds: Whole with water     Therapeutic Interventions: Diet tolerance monitoring;Oral care; Patient/Family education    Compensatory Swallowing Strategies  Upright as possible for all oral intake    Treatment/Goals  1-The patient will tolerate recommended diet without observed clinical signs of aspiration  2-The patient/caregiver will demonstrate understanding of compensatory strategies for improved swallowing safety. ;8/15: Educated pt and family member to purpose of visit, s/s of aspiration, concern if aspiration occurs, rationale for diet recommendation/strategies to reduce risk for aspiration and instruction to notify staff if any signs emerge. Pt stated comprehension  Cont goal    General  Chart Reviewed: Yes  Behavior/Cognition: Alert; Cooperative  Respiratory Status: O2 via nasual cannula  Follows Directions: Complex  Dentition: Adequate  Patient Positioning: Upright in bed  Baseline Vocal Quality: Normal  Volitional Cough: Strong  Prior Dysphagia History: Pt had MBS 1/4/22 with no aspiration identified    Vision/Hearing  Vision  Vision: Impaired  Vision Exceptions: Wears glasses at all times  Hearing  Hearing: Exceptions to [facility]  Hearing Exceptions: Hard of hearing/hearing concerns;Bilateral hearing aid    Oral Motor Deficits  Grossly intact    Prognosis  Individuals consulted  Consulted and agree with results and recommendations: Patient;RN;Family member  RN Name: Vilma Conway    Education  Patient Education: Pt and family member educated to purpose of visit  Patient Education Response: Verbalizes understanding  Safety Devices in place: Yes  Type of devices: Call light within reach       Therapy Time  Time in: 830  Time out: 855    Plan:  Recommended diet: regular diet/thin liquids-if any s/s of aspiration emerge, or there is respiratory decline,  make NPO until further evaluated by SLP  Dc recommendation: monitor need for full ST/cog eval  Pt therapy goal: denies any impairments  Pt dc goal: to go home  Samina Patel M.S./[facility]-SLP #[id number]  Pg.  # [phone]  Needs met prior to leaving room, call light within reach, d/w VIET Conway  This document will serve as a dc summary if pt dc prior to next visit  8/15/2022 9:25 AM

## 2022-08-16 ENCOUNTER — FOLLOWUP TELEPHONE ENCOUNTER (OUTPATIENT)
Dept: ICU | Age: 87
End: 2022-08-16

## 2022-08-17 ENCOUNTER — HOSPITAL ENCOUNTER (INPATIENT)
Age: 87
LOS: 5 days | Discharge: NURSING, REHAB OR LONG-TERM CARE | DRG: 310 | End: 2022-08-23
Attending: STUDENT IN AN ORGANIZED HEALTH CARE EDUCATION/TRAINING PROGRAM | Admitting: INTERNAL MEDICINE
Payer: MEDICARE

## 2022-08-17 ENCOUNTER — APPOINTMENT (OUTPATIENT)
Dept: CT IMAGING | Age: 87
DRG: 310 | End: 2022-08-17
Payer: MEDICARE

## 2022-08-17 ENCOUNTER — APPOINTMENT (OUTPATIENT)
Dept: GENERAL RADIOLOGY | Age: 87
DRG: 310 | End: 2022-08-17
Payer: MEDICARE

## 2022-08-17 ENCOUNTER — APPOINTMENT (OUTPATIENT)
Dept: MRI IMAGING | Age: 87
DRG: 310 | End: 2022-08-17
Payer: MEDICARE

## 2022-08-17 DIAGNOSIS — I71.40 ABDOMINAL AORTIC ANEURYSM (AAA) WITHOUT RUPTURE: ICD-10-CM

## 2022-08-17 DIAGNOSIS — I62.9 INTRACRANIAL HEMORRHAGE (HCC): Primary | ICD-10-CM

## 2022-08-17 DIAGNOSIS — R29.6 FALLS FREQUENTLY: ICD-10-CM

## 2022-08-17 LAB
ABO/RH: NORMAL
ANION GAP SERPL CALCULATED.3IONS-SCNC: 8 MMOL/L (ref 3–16)
ANTIBODY SCREEN: NORMAL
BASOPHILS ABSOLUTE: 0.1 K/UL (ref 0–0.2)
BASOPHILS RELATIVE PERCENT: 0.8 %
BILIRUBIN URINE: NEGATIVE
BLOOD, URINE: NEGATIVE
BUN BLDV-MCNC: 28 MG/DL (ref 7–20)
CALCIUM SERPL-MCNC: 9.3 MG/DL (ref 8.3–10.6)
CHLORIDE BLD-SCNC: 93 MMOL/L (ref 99–110)
CLARITY: CLEAR
CO2: 30 MMOL/L (ref 21–32)
COLOR: YELLOW
CREAT SERPL-MCNC: 1.4 MG/DL (ref 0.8–1.3)
EKG ATRIAL RATE: 71 BPM
EKG DIAGNOSIS: NORMAL
EKG P AXIS: 90 DEGREES
EKG P-R INTERVAL: 170 MS
EKG Q-T INTERVAL: 428 MS
EKG QRS DURATION: 80 MS
EKG QTC CALCULATION (BAZETT): 465 MS
EKG R AXIS: 55 DEGREES
EKG T AXIS: 70 DEGREES
EKG VENTRICULAR RATE: 71 BPM
EOSINOPHILS ABSOLUTE: 0.5 K/UL (ref 0–0.6)
EOSINOPHILS RELATIVE PERCENT: 6.3 %
GFR AFRICAN AMERICAN: 58
GFR NON-AFRICAN AMERICAN: 48
GLUCOSE BLD-MCNC: 160 MG/DL (ref 70–99)
GLUCOSE URINE: NEGATIVE MG/DL
HCT VFR BLD CALC: 32.2 % (ref 40.5–52.5)
HEMOGLOBIN: 10.7 G/DL (ref 13.5–17.5)
INR BLD: 1.18 (ref 0.87–1.14)
KETONES, URINE: NEGATIVE MG/DL
LEUKOCYTE ESTERASE, URINE: NEGATIVE
LYMPHOCYTES ABSOLUTE: 1 K/UL (ref 1–5.1)
LYMPHOCYTES RELATIVE PERCENT: 12.5 %
MCH RBC QN AUTO: 29.4 PG (ref 26–34)
MCHC RBC AUTO-ENTMCNC: 33.3 G/DL (ref 31–36)
MCV RBC AUTO: 88.1 FL (ref 80–100)
MICROSCOPIC EXAMINATION: YES
MONOCYTES ABSOLUTE: 1.1 K/UL (ref 0–1.3)
MONOCYTES RELATIVE PERCENT: 13.2 %
NEUTROPHILS ABSOLUTE: 5.5 K/UL (ref 1.7–7.7)
NEUTROPHILS RELATIVE PERCENT: 67.2 %
NITRITE, URINE: NEGATIVE
PDW BLD-RTO: 16.4 % (ref 12.4–15.4)
PH UA: 6 (ref 5–8)
PLATELET # BLD: 305 K/UL (ref 135–450)
PMV BLD AUTO: 7.9 FL (ref 5–10.5)
POTASSIUM REFLEX MAGNESIUM: 4.3 MMOL/L (ref 3.5–5.1)
PROTEIN UA: ABNORMAL MG/DL
PROTHROMBIN TIME: 15 SEC (ref 11.7–14.5)
RBC # BLD: 3.65 M/UL (ref 4.2–5.9)
RBC UA: NORMAL /HPF (ref 0–4)
SODIUM BLD-SCNC: 131 MMOL/L (ref 136–145)
SPECIFIC GRAVITY UA: 1.01 (ref 1–1.03)
URINE TYPE: ABNORMAL
UROBILINOGEN, URINE: 0.2 E.U./DL
WBC # BLD: 8.2 K/UL (ref 4–11)
WBC UA: NORMAL /HPF (ref 0–5)

## 2022-08-17 PROCEDURE — 70450 CT HEAD/BRAIN W/O DYE: CPT

## 2022-08-17 PROCEDURE — 83036 HEMOGLOBIN GLYCOSYLATED A1C: CPT

## 2022-08-17 PROCEDURE — 72125 CT NECK SPINE W/O DYE: CPT

## 2022-08-17 PROCEDURE — 80048 BASIC METABOLIC PNL TOTAL CA: CPT

## 2022-08-17 PROCEDURE — 86900 BLOOD TYPING SEROLOGIC ABO: CPT

## 2022-08-17 PROCEDURE — 72157 MRI CHEST SPINE W/O & W/DYE: CPT

## 2022-08-17 PROCEDURE — 36415 COLL VENOUS BLD VENIPUNCTURE: CPT

## 2022-08-17 PROCEDURE — 85025 COMPLETE CBC W/AUTO DIFF WBC: CPT

## 2022-08-17 PROCEDURE — 72131 CT LUMBAR SPINE W/O DYE: CPT

## 2022-08-17 PROCEDURE — 72128 CT CHEST SPINE W/O DYE: CPT

## 2022-08-17 PROCEDURE — 85610 PROTHROMBIN TIME: CPT

## 2022-08-17 PROCEDURE — 86850 RBC ANTIBODY SCREEN: CPT

## 2022-08-17 PROCEDURE — 81001 URINALYSIS AUTO W/SCOPE: CPT

## 2022-08-17 PROCEDURE — 86901 BLOOD TYPING SEROLOGIC RH(D): CPT

## 2022-08-17 PROCEDURE — 99285 EMERGENCY DEPT VISIT HI MDM: CPT

## 2022-08-17 PROCEDURE — A9576 INJ PROHANCE MULTIPACK: HCPCS

## 2022-08-17 PROCEDURE — 93005 ELECTROCARDIOGRAM TRACING: CPT | Performed by: STUDENT IN AN ORGANIZED HEALTH CARE EDUCATION/TRAINING PROGRAM

## 2022-08-17 PROCEDURE — 71045 X-RAY EXAM CHEST 1 VIEW: CPT

## 2022-08-17 PROCEDURE — 72158 MRI LUMBAR SPINE W/O & W/DYE: CPT

## 2022-08-17 PROCEDURE — 6360000004 HC RX CONTRAST MEDICATION

## 2022-08-17 RX ADMIN — GADOTERIDOL 14 ML: 279.3 INJECTION, SOLUTION INTRAVENOUS at 23:08

## 2022-08-17 ASSESSMENT — ENCOUNTER SYMPTOMS
VOMITING: 0
SHORTNESS OF BREATH: 0
BACK PAIN: 0
ABDOMINAL PAIN: 0
COUGH: 0
DIARRHEA: 0
CHEST TIGHTNESS: 0
NAUSEA: 0

## 2022-08-17 ASSESSMENT — PAIN - FUNCTIONAL ASSESSMENT: PAIN_FUNCTIONAL_ASSESSMENT: NONE - DENIES PAIN

## 2022-08-17 NOTE — ED PROVIDER NOTES
[facility]          PHYSICIAN ASSISTANT NOTE       Date of evaluation: 8/17/2022    Chief Complaint     Fatigue (Pt discharged from here Mon was here for a brain bleed since going home has gotten worse can hardly walk fell 4 times yesterday very weak )      History of Present Illness     Jolene Johnson is a 82 y.o. male with a history of hypertension, hyperlipidemia, CHF, atrial fibrillation on Eliquis, CVA who was discharged 2 days ago following a fall onto the patient's left side witnessed on cameras at the patient's home. CT showed IVH to the left lateral ventricle and patient was managed by neurosurgery here. No suspicion for CVA. Patient was discharged after repeat CT and lab work. The patient had his first appointment with neurosurgery today with Dr Claude Arciniega at [facility] and was sent here from their clinic with need for further work-up according to the son. The patient comes in with his son and reports that the patient has had 4 falls since returning home. Patient is unsure if he struck his head during these falls. He denies any pain or injuries from the falls but reports that he can no longer use his walker with any stability. He has been moving around in a wheelchair since coming home. His living situation is untenable according to the patient and his son as he lives with another older roommate who is now unable to care for or help him. The patient's son does note that he is having trouble getting to the bathroom on time currently and has urinated himself several times which is new for this patient. The patient denies any new symptoms otherwise including fevers, cough, chest pain, headache, nausea, vomiting, change in bowel or bladder function. He denies any numbness or tingling. Review of Systems     Review of Systems   Constitutional:  Negative for chills and fever. Respiratory:  Negative for cough, chest tightness and shortness of breath. Cardiovascular:  Negative for chest pain. Gastrointestinal:  Negative for abdominal pain, diarrhea, nausea and vomiting. Genitourinary:  Negative for dysuria. Musculoskeletal:  Negative for back pain and neck pain. Neurological:  Negative for dizziness, light-headedness and headaches. Past Medical, Surgical, Family, and Social History     He has a past medical history of Atrial fibrillation ([address] CHF (congestive heart failure) ([address] Hypertension, and Thyroid disease. He has a past surgical history that includes Carpal tunnel release (Bilateral); Lumbar spine surgery; and Cystoscopy. His Family history is unknown by patient. He reports that he has quit smoking. He has quit using smokeless tobacco. He reports that he does not currently use alcohol. Medications     Previous Medications    CARVEDILOL (COREG) 3.125 MG TABLET    Take 3.125 mg by mouth in the morning and 3.125 mg in the evening. Take with meals. CEFUROXIME (CEFTIN) 500 MG TABLET    Take 1 tablet by mouth in the morning and 1 tablet before bedtime. Do all this for 10 days. CHOLECALCIFEROL (VITAMIN D3) 50 MCG (2000 UT) CAPS    Take by mouth 2 times daily    COMBIGAN 0.2-0.5 % OPHTHALMIC SOLUTION    Place 1 drop into both eyes every 12 hours     DILTIAZEM (CARDIZEM) 30 MG TABLET    Take 30 mg by mouth in the morning. PRN HR over 100 for more than 10 min. FLUTICASONE-UMECLIDIN-VILANT (TRELEGY ELLIPTA) 100-62.5-25 MCG/INH AEPB    Inhale 1 puff into the lungs daily    FUROSEMIDE (LASIX) 40 MG TABLET    Take 40 mg by mouth in the morning. JANUVIA 50 MG TABLET    Take 50 mg by mouth daily     LATANOPROST (XALATAN) 0.005 % OPHTHALMIC SOLUTION    Place 1 drop into both eyes daily     LEVOTHYROXINE (SYNTHROID) 75 MCG TABLET    Take 75 mcg by mouth Daily    METRONIDAZOLE (FLAGYL) 500 MG TABLET    Take 1 tablet by mouth in the morning and 1 tablet at noon and 1 tablet before bedtime. Do all this for 10 days.     MULTIPLE VITAMIN (MULTIVITAMIN) TABLET    Take 1 tablet by mouth daily    POTASSIUM CHLORIDE (MICRO-K) 10 MEQ EXTENDED RELEASE CAPSULE    Take 10 mEq by mouth in the morning. PROBIOTIC PRODUCT (PROBIOTIC ADVANCED PO)    Take 1 caplet by mouth daily    VALSARTAN (DIOVAN) 40 MG TABLET    Take 40 mg by mouth in the morning. Allergies     He is allergic to naproxen. Physical Exam     INITIAL VITALS: BP: (!) 107/53, Temp: 97.7 °F (36.5 °C), Heart Rate: 76, Resp: 17, SpO2: 92 %  Physical Exam  Constitutional:       Appearance: Normal appearance. HENT:      Head: Normocephalic and atraumatic. Comments: No obvious injuries to the patient's face or scalp including abrasions, hematomas or lacerations. Cardiovascular:      Rate and Rhythm: Normal rate and regular rhythm. Pulses: Normal pulses. Heart sounds: Normal heart sounds. Pulmonary:      Effort: Pulmonary effort is normal.      Breath sounds: Normal breath sounds. Musculoskeletal:         General: Normal range of motion. Cervical back: Normal range of motion. Comments: Patient does have some ecchymosis over his extremities bilaterally that does not appear new. He otherwise has no deformities or other evidence of injuries over his extremities, trunk, chest.  No tenderness to palpation of the cervical, thoracic, lumbar midline spine. He has full range of motion of his neck. No tenderness to palpation of his extremities, chest, back, pelvis. No shortening, inward or outward rotation of his lower extremities. He has full sensation in all extremities. Skin:     General: Skin is warm and dry. Neurological:      Mental Status: He is alert and oriented to person, place, and time.    Psychiatric:         Mood and Affect: Mood normal.         Behavior: Behavior normal.       Diagnostic Results       RADIOLOGY:  CT HEAD WO CONTRAST    (Results Pending)       LABS:   Results for orders placed or performed during the hospital encounter of 08/17/22 CBC with Auto Differential   Result Value Ref Range    WBC 8.2 4.0 - 11.0 K/uL    RBC 3.65 (L) 4.20 - 5.90 M/uL    Hemoglobin 10.7 (L) 13.5 - 17.5 g/dL    Hematocrit 32.2 (L) 40.5 - 52.5 %    MCV 88.1 80.0 - 100.0 fL    MCH 29.4 26.0 - 34.0 pg    MCHC 33.3 31.0 - 36.0 g/dL    RDW 16.4 (H) 12.4 - 15.4 %    Platelets 278 936 - 154 K/uL    MPV 7.9 5.0 - 10.5 fL    Neutrophils % 67.2 %    Lymphocytes % 12.5 %    Monocytes % 13.2 %    Eosinophils % 6.3 %    Basophils % 0.8 %    Neutrophils Absolute 5.5 1.7 - 7.7 K/uL    Lymphocytes Absolute 1.0 1.0 - 5.1 K/uL    Monocytes Absolute 1.1 0.0 - 1.3 K/uL    Eosinophils Absolute 0.5 0.0 - 0.6 K/uL    Basophils Absolute 0.1 0.0 - 0.2 K/uL   Basic Metabolic Panel w/ Reflex to MG   Result Value Ref Range    Sodium 131 (L) 136 - 145 mmol/L    Potassium reflex Magnesium 4.3 3.5 - 5.1 mmol/L    Chloride 93 (L) 99 - 110 mmol/L    CO2 30 21 - 32 mmol/L    Anion Gap 8 3 - 16    Glucose 160 (H) 70 - 99 mg/dL    BUN 28 (H) 7 - 20 mg/dL    Creatinine 1.4 (H) 0.8 - 1.3 mg/dL    GFR Non- 48 (A) >60    GFR  58 (A) >60    Calcium 9.3 8.3 - 10.6 mg/dL   Urinalysis   Result Value Ref Range    Color, UA Yellow Straw/Yellow    Clarity, UA Clear Clear    Glucose, Ur Negative Negative mg/dL    Bilirubin Urine Negative Negative    Ketones, Urine Negative Negative mg/dL    Specific Gravity, UA 1.010 1.005 - 1.030    Blood, Urine Negative Negative    pH, UA 6.0 5.0 - 8.0    Protein, UA TRACE (A) Negative mg/dL    Urobilinogen, Urine 0.2 <2.0 E.U./dL    Nitrite, Urine Negative Negative    Leukocyte Esterase, Urine Negative Negative    Microscopic Examination YES     Urine Type Voided    EKG 12 Lead   Result Value Ref Range    Ventricular Rate 71 BPM    Atrial Rate 71 BPM    P-R Interval 170 ms    QRS Duration 80 ms    Q-T Interval 428 ms    QTc Calculation (Bazett) 465 ms    P Axis 90 degrees    R Axis 55 degrees    T Axis 70 degrees    Diagnosis       EKG performed in ER and to be interpreted by ER physician. Confirmed by MD, ER ([id number]),  Berthaflorencio Grant ([id number]) on 8/17/2022 4:18:36 PM       ED BEDSIDE ULTRASOUND:  No results found. RECENT VITALS:  BP: (!) 156/66, Temp: 97.7 °F (36.5 °C), Heart Rate: 71, Resp: 18, SpO2: 100 %     Procedures         ED Course     Nursing Notes, Past Medical Hx,Past Surgical Hx, Social Hx, Allergies, and Family Hx were reviewed. The patient was given the following medications:  No orders of the defined types were placed in this encounter. CONSULTS:  Steve Patterson / AUGUSTO / Denia Rut is a 82 y.o. male with a history of hypertension, hyperlipidemia, CHF, atrial fibrillation on Eliquis, CVA who was discharged 2 days ago following a fall onto the patient's left side witnessed on cameras at the patient's home. CT showed IVH to the left lateral ventricle and patient was managed by neurosurgery here. No suspicion for CVA. Patient was discharged after repeat CT and lab work. The patient had his first appointment with neurosurgery today with Dr Kayla Gamez at [address] [place] [address] and was sent here from their clinic with need for further work-up according to the son. The patient comes in with his son and reports that the patient has had 4 falls since returning home. Patient is unsure if he struck his head during these falls. He denies any pain or injuries from the falls but reports that he can no longer use his walker with any stability. He has been moving around in a wheelchair since coming home. His living situation is untenable according to the patient and his son as he lives with another older roommate who is now unable to care for or help him. The patient's son does note that he is having trouble getting to the bathroom on time currently and has urinated himself several times which is new for this patient.  The patient denies any new symptoms otherwise including fevers, cough, chest pain, headache, nausea, vomiting, change in bowel or bladder function. He denies any numbness or tingling. He is alert and oriented x4. O2 saturation is 92% on room air. He was placed on 3 L via nasal cannula with increase in O2 saturation to100%. Patient is on oxygen intermittently at home but has not been on oxygen recently. All other vital signs are within normal limits. On exam there are no obvious injuries to the patient's face, scalp, extremities, trunk. Patient does not appear to be in any respiratory distress. CBC is unremarkable  BMP shows BUN 28, creatinine 1.4 both of which are near the values consistent during the patient's prior admission and discharge; sodium is 131  UA is unremarkable    Of note the patient was seen by Dr. Claude Arciniega today at the [facility]      At this time I am going off service will be turning over care of this patient to my colleague Dr. Herrera Calzada for continued care and evaluation. Steps remaining in evaluation at the time I went off service were:    CT head  Neurosurgery consult  Disposition    This patient was also evaluated by the attending physician. All care plans were discussed and agreed upon. Clinical Impression     1. Falls frequently        Disposition     PATIENT REFERRED TO:  No follow-up provider specified.     DISCHARGE MEDICATIONS:  New Prescriptions    No medications on file       [address] [facility] [place]-  08/17/22 3283

## 2022-08-17 NOTE — ED PROVIDER NOTES
ED Attending Attestation Note     Date of evaluation: 8/17/2022    This patient was seen by the advanced practice provider. I have seen and examined the patient, agree with the workup, evaluation, management and diagnosis. The care plan has been discussed. My assessment reveals a gentleman with recent intraventricular hemorrhage who presents with recurrent falls, fatigue  On exam    Vitals:    08/17/22 1630 08/17/22 1645 08/17/22 1730 08/17/22 2030   BP: (!) 154/70  (!) 156/66 (!) 166/65   Pulse:   71 74   Resp:   18 18   Temp:       TempSrc:       SpO2: (!) 85% 97% 100% 99%   Weight:       Height:           General:  Well appearing. No acute distress. Non-toxic appearing    Eyes:  Pupils equally round . No discharge from eyes. ENT:  No discharge from nose. OP clear. Neck:  Supple. Trachea midline. Full range of motion without limitation including lateral rotation. Pulmonary:   Non-labored breathing. Breath sounds clear bilaterally. Cardiac:  Regular rhythm. Normal rate. No murmurs. Abdomen:  Soft. Non-tender. Non-distended. No masses. Musculoskeletal:  No long bone deformity. No ankle or wrist deformity. C/T/L spine without TTP or deformity. Vascular:  Extremities warm and perfused. Skin:  No rash. Warm. Neuro: Alert and conversant. CN II-XII tested in detail and intact. Speech and mentation normal.    No pronator drift. LE at least anti-gravity for hip flexion x5 secs b/l.  5/5 strength in all 4 extremities by finger  and ankle dorsi/plantar flexion. Sensation grossly intact to light touch. Extremities:  No peripheral edema. LE symmetric. His son subsequently arrived provides additional history. Specifically he indicates that there was some concern by a provider at some point related to the patient voiding urine.   It does seem that the patient simply has difficulty getting to the bathroom in time given his overall deconditioned state, but because there has been concern raised for the possibility of a spine process, and the patient was recently anticoagulated head trauma, I did feel that exclusion of epidural hematoma was required. We did proceed initially with CT scans that showed no bony pathology emergently. However we will proceed to an MRI T and L-spine with without to evaluate for any underlying epidural hematoma. Noncontrast head CT did show evidence of a possible periventricular bleed, but overall was grossly stable. The patient has not had acute change in mental status either by his report or his son's report. There is no indication for reversal at this time as the patient is not anticoagulated. I contacted neurosurgery who felt the patient would be appropriate for floor status. They agreed to see the patient in the morning.      Joshua Corey MD  08/17/22 9091

## 2022-08-17 NOTE — ED PROVIDER NOTES
[address] [facility] RESIDENT NOTE     Date of evaluation: 8/17/2022    ADDENDUM:      Care of this patient was assumed from Karthik Harris @8916. The patient was seen for Fatigue (Pt discharged from here Mon was here for a brain bleed since going home has gotten worse can hardly walk fell 4 times yesterday very weak )    The patient's initial evaluation and plan have been discussed with the prior provider who initially evaluated the patient. Nursing Notes, Past Medical Hx, Past Surgical Hx, Social Hx, Allergies, and Family Hx were all reviewed. In summary, this is a 77-year-old male with history of atrial fibrillation on Eliquis (HELD since 8/13), prior CVA (baseline right-sided deficits), hypertension, hyperlipidemia, CHF who was seen in the emergency department on 8/13/2022 for an intraventricular hemorrhage. Given Kcentra recommendation for hold all AC for 2 weeks and was discharged home. Saw neurosurgery at [address] [place] [address] in clinic and was sent to the emergency department with concern given his 4 falls at home since discharge and potential need for repeat head imaging. See primary ED note by JOSY Powell for additional information. At time of shift change, the following was pending:  -Follow-up CT head results  -F/u INR  -c/s NSGY pending ct head results (unless can reach op neuro)  -Anticipate admission to medicine hospitalist team    On my later discussion with the patient's son, he shared concerns about urinary incontinence. Patient had 3 episodes prior to their neurology clinic appointment this morning. The patient denies any saddle anesthesia. No loss of bowel control. Neurological exam unremarkable from this perspective. No new deficits appreciated. We will obtain spinal imaging. Diagnostic Results     EKG   Normal sinus rhythm, normal intervals, no acute ischemic changes    RADIOLOGY:  MRI CERVICAL SPINE WO CONTRAST   Final Result   1.  Advanced cervical spondylosis with associated disc herniations resulting in varying degrees of central canal and foraminal stenosis as detailed above. MRI BRAIN WO CONTRAST   Final Result   1. Small amount of hemorrhage layering in the occipital horns of the bilateral lateral ventricles. FLAIR hyperintensity also noted in the bilateral subarachnoid space, compatible with some subarachnoid hemorrhage, within the mid and posterior/dependent    brain, involving bilateral frontal, parietal, occipital and temporal regions. 2. Susceptibility artifact along the lateral aspect of the left lateral ventricle body, most compatible with subependymal parenchymal hemorrhage, similar to prior CT. 3. Diffusion restriction compatible with recent small ischemic infarct along the medial right frontal cortex. 4. Moderate to severe diffuse cerebral atrophy with superimposed moderate to severe small vessel ischemic disease. MRI THORACIC SPINE W WO CONTRAST   Final Result      Unremarkable thoracic spine MRI. MRI LUMBAR SPINE W WO CONTRAST   Final Result      Multilevel discogenic/spondylotic changes with multilevel canal and foraminal narrowing as above. Abdominal aortic aneurysm. No abnormal enhancement following contrast administration. CT CERVICAL SPINE WO CONTRAST   Final Result      No acute fracture identified. CT LUMBAR SPINE WO CONTRAST   Final Result      Spondylotic changes as above. No acute fracture seen. Abdominal aortic aneurysm measuring up to 3.2 cm. CT THORACIC SPINE WO CONTRAST   Final Result      Thoracic scoliosis. No acute fracture seen. XR CHEST PORTABLE   Final Result      Mild left basilar atelectasis or infiltrate. CT HEAD WO CONTRAST   Final Result      Persistent intraventricular hemorrhage with small amount of suspected parenchymal bleed along the lateral aspect of the posterior horn of the left lateral ventricle.  The extent of the hemorrhage has decreased in comparison the prior study but interval    rebleed is a potential consideration. Results called to Dr. Syeda Giles at 7:15 PM on 8/17/2022. CT HEAD WITHOUT CONTRAST     HISTORY: Fall with head injury     COMPARISON: 8/13/2022     Underexposure controls were utilized during the CT examination to meet ALARA standards for radiation dose reduction. FINDINGS:     Advanced atrophy and white matter ischemic changes are noted. Small amount of hemorrhage is seen adjacent to the posterior horn of the left lateral ventricle with a small amount of intraventricular hemorrhage also noted. The extent of the blood has decreased in comparison to the prior examination. There are no definite signs to suggest acute infarction. However, if there is concern of early infarction or other subtle intracranial abnormality, MRI correlation should be considered. Visualized portions of the paranasal sinuses appear clear. IMPRESSION:     Persistent intraventricular hemorrhage with small amount of suspected parenchymal bleed along the lateral aspect of the posterior horn of the left lateral ventricle. The extent of the hemorrhage has decreased in comparison the prior study but interval  rebleed is a potential consideration. Results called to Dr. Syeda Giles at 7:15 PM on 8/17/2022. EXAM: CT OF THE LUMBAR SPINE WITHOUT CONTRAST       INDICATION: Fall with pain       COMPARISON: None       TECHNIQUE: CT of the lumbar spinewas performed without contrast. Axial images and multiplanar reformatted images were performed. Up-to-date CT equipment and radiation dose reduction techniques were employed. CONTRAST: None. FINDINGS:       There is normal alignment of the lumbar vertebra with no evidence of acute fracture or traumatic abnormality seen.        Moderate multilevel spondylotic changes are noted most prominent at the L2-3 level but where there is severe disc height narrowing and endplate sclerosis. Multilevel facet arthropathy is also noted most prominent at L5-S1. If concern of soft disc pathology, correlation with MRI would be recommended. Incidentally noted is 3.2 cm abdominal aortic aneurysm.             LABS:   Results for orders placed or performed during the hospital encounter of 08/17/22   CBC with Auto Differential   Result Value Ref Range    WBC 8.2 4.0 - 11.0 K/uL    RBC 3.65 (L) 4.20 - 5.90 M/uL    Hemoglobin 10.7 (L) 13.5 - 17.5 g/dL    Hematocrit 32.2 (L) 40.5 - 52.5 %    MCV 88.1 80.0 - 100.0 fL    MCH 29.4 26.0 - 34.0 pg    MCHC 33.3 31.0 - 36.0 g/dL    RDW 16.4 (H) 12.4 - 15.4 %    Platelets 737 283 - 165 K/uL    MPV 7.9 5.0 - 10.5 fL    Neutrophils % 67.2 %    Lymphocytes % 12.5 %    Monocytes % 13.2 %    Eosinophils % 6.3 %    Basophils % 0.8 %    Neutrophils Absolute 5.5 1.7 - 7.7 K/uL    Lymphocytes Absolute 1.0 1.0 - 5.1 K/uL    Monocytes Absolute 1.1 0.0 - 1.3 K/uL    Eosinophils Absolute 0.5 0.0 - 0.6 K/uL    Basophils Absolute 0.1 0.0 - 0.2 K/uL   Basic Metabolic Panel w/ Reflex to MG   Result Value Ref Range    Sodium 131 (L) 136 - 145 mmol/L    Potassium reflex Magnesium 4.3 3.5 - 5.1 mmol/L    Chloride 93 (L) 99 - 110 mmol/L    CO2 30 21 - 32 mmol/L    Anion Gap 8 3 - 16    Glucose 160 (H) 70 - 99 mg/dL    BUN 28 (H) 7 - 20 mg/dL    Creatinine 1.4 (H) 0.8 - 1.3 mg/dL    GFR Non- 48 (A) >60    GFR  58 (A) >60    Calcium 9.3 8.3 - 10.6 mg/dL   Urinalysis   Result Value Ref Range    Color, UA Yellow Straw/Yellow    Clarity, UA Clear Clear    Glucose, Ur Negative Negative mg/dL    Bilirubin Urine Negative Negative    Ketones, Urine Negative Negative mg/dL    Specific Gravity, UA 1.010 1.005 - 1.030    Blood, Urine Negative Negative    pH, UA 6.0 5.0 - 8.0    Protein, UA TRACE (A) Negative mg/dL    Urobilinogen, Urine 0.2 <2.0 E.U./dL    Nitrite, Urine Negative Negative    Leukocyte Esterase, Urine Negative Negative    Microscopic Examination YES     Urine Type Voided    Microscopic Urinalysis   Result Value Ref Range    WBC, UA 0-2 0 - 5 /HPF    RBC, UA None seen 0 - 4 /HPF   Protime-INR   Result Value Ref Range    Protime 15.0 (H) 11.7 - 14.5 sec    INR 1.18 (H) 0.87 - 1.14   Hemoglobin A1c   Result Value Ref Range    Hemoglobin A1C 8.0 See comment %    eAG 182.9 mg/dL   Basic Metabolic Panel w/ Reflex to MG   Result Value Ref Range    Sodium 136 136 - 145 mmol/L    Potassium reflex Magnesium 3.8 3.5 - 5.1 mmol/L    Chloride 97 (L) 99 - 110 mmol/L    CO2 26 21 - 32 mmol/L    Anion Gap 13 3 - 16    Glucose 116 (H) 70 - 99 mg/dL    BUN 25 (H) 7 - 20 mg/dL    Creatinine 1.3 0.8 - 1.3 mg/dL    GFR Non-African American 52 (A) >60    GFR African American >60 >60    Calcium 9.0 8.3 - 10.6 mg/dL   CBC with Auto Differential   Result Value Ref Range    WBC 8.9 4.0 - 11.0 K/uL    RBC 3.63 (L) 4.20 - 5.90 M/uL    Hemoglobin 10.6 (L) 13.5 - 17.5 g/dL    Hematocrit 32.5 (L) 40.5 - 52.5 %    MCV 89.6 80.0 - 100.0 fL    MCH 29.2 26.0 - 34.0 pg    MCHC 32.6 31.0 - 36.0 g/dL    RDW 16.5 (H) 12.4 - 15.4 %    Platelets 627 460 - 876 K/uL    MPV 8.0 5.0 - 10.5 fL    Neutrophils % 72.1 %    Lymphocytes % 9.4 %    Monocytes % 11.7 %    Eosinophils % 5.3 %    Basophils % 1.5 %    Neutrophils Absolute 6.4 1.7 - 7.7 K/uL    Lymphocytes Absolute 0.8 (L) 1.0 - 5.1 K/uL    Monocytes Absolute 1.0 0.0 - 1.3 K/uL    Eosinophils Absolute 0.5 0.0 - 0.6 K/uL    Basophils Absolute 0.1 0.0 - 0.2 K/uL   POCT Glucose   Result Value Ref Range    POC Glucose 144 (H) 70 - 99 mg/dl    Performed on [employer]    POCT Glucose   Result Value Ref Range    POC Glucose 178 (H) 70 - 99 mg/dl    Performed on [employer]    EKG 12 Lead   Result Value Ref Range    Ventricular Rate 71 BPM    Atrial Rate 71 BPM    P-R Interval 170 ms    QRS Duration 80 ms    Q-T Interval 428 ms    QTc Calculation (Bazett) 465 ms    P Axis 90 degrees    R Axis 55 degrees    T Axis 70 degrees    Diagnosis       EKG performed in ER and to be interpreted by ER physician. Confirmed by MD, ER ([id number]),  William Jo ([id number]) on 8/17/2022 4:18:36 PM   TYPE AND SCREEN   Result Value Ref Range    ABO/Rh A POS     Antibody Screen NEG        RECENT VITALS:  BP: (!) 102/53, Temp: 98.1 °F (36.7 °C), Heart Rate: 81, Resp: 16, SpO2: 100 %     Procedures     None    ED Course     ED Course as of 08/18/22 2037   Wed Aug 17, 2022   1918 CT HEAD WO CONTRAST [IL]      ED Course User Index  [IL] Ubaldo White MD       The patient was given the following medications:  Orders Placed This Encounter   Medications    gadoteridol (PROHANCE) injection 14 mL    carvedilol (COREG) tablet 3.125 mg    DISCONTD: brimonidine-timolol (COMBIGAN) 0.2-0.5 % ophthalmic solution 1 drop     Order Specific Question:   Please select a reason the therapeutic interchange was not accepted:      Answer:   Patient Preference    DISCONTD: fluticasone-umeclidin-vilant (TRELEGY ELLIPTA) 100-62.5-25 MCG/INH inhaler 1 puff    latanoprost (XALATAN) 0.005 % ophthalmic solution 1 drop    levothyroxine (SYNTHROID) tablet 75 mcg    glucose chewable tablet 16 g    OR Linked Order Group     dextrose bolus 10% 125 mL     dextrose bolus 10% 250 mL    glucagon (rDNA) injection 1 mg    dextrose 10 % infusion    sodium chloride flush 0.9 % injection 5-40 mL    sodium chloride flush 0.9 % injection 5-40 mL    0.9 % sodium chloride infusion    OR Linked Order Group     ondansetron (ZOFRAN-ODT) disintegrating tablet 4 mg     ondansetron (ZOFRAN) injection 4 mg    polyethylene glycol (GLYCOLAX) packet 17 g    OR Linked Order Group     acetaminophen (TYLENOL) tablet 650 mg     acetaminophen (TYLENOL) suppository 650 mg    insulin lispro (1 Unit Dial) 0-4 Units    insulin lispro (1 Unit Dial) 0-4 Units    AND Linked Order Group     brimonidine (ALPHAGAN) 0.2 % ophthalmic solution 1 drop     timolol (TIMOPTIC) 0.5 % ophthalmic solution 1 drop    AND Linked Order Group     budesonide (PULMICORT) nebulizer suspension 250 mcg     tiotropium-olodaterol (STIOLTO) 2.5-2.5 MCG/ACT inhaler 2 puff         CONSULTS:  IP CONSULT TO HOSPITALIST  IP CONSULT TO NEUROSURGERY  IP CONSULT TO [address] [place] [address] / ASSESSMENT / PLAN     Akosua Mac is a 82 y.o. male with history as described above previously on Eliquis (s/p Kcentra and hold on 8/13) with IVH on imaging after a fall 8/13 who was sent to the ED today [facility] due to concerns of frequent falls and potential need for additional imaging. CT head today showed interval decrease in size of IVH seen on 8/13 though cannot rule out rebleed. I spoke with neurosurgery who did not feel the CT was concerning for new bleed. Patient has no new deficits. Per neurosurgery, would be appropriate for floor management from this perspective. As discussed above, patient's son brought up concerns about urinary incontinence. Given this, obtained imaging of the C-T-L-spine with plan for lumbar MRI to rule out spinal cord pathology. Physical exam reassuring from this perspective with no new obvious deficits. CT and MRIs negative for epidural hematoma or any other acute pathology or spinal cord compression that may explain his symptoms. Unclear etiology of urinary incontinence, recommend further evaluation if this does not resolve. Patient will require admission to medicine hospitalist team, neurosurgery will reevaluate the patient tomorrow. This patient was also evaluated by the attending physician. All care plans were discussed and agreed upon. Incidentals: Incidentally noted is 3.2 cm abdominal aortic aneurysm on lumbar CT and MRI. Does not appear acutely symptomatic at this time. Recommend PCP follow-up as appropriate. Clinical Impression     1. Intracranial hemorrhage ([address]    2. Falls frequently    3.  Abdominal aortic aneurysm (AAA) without rupture (HCC)        Disposition     PATIENT REFERRED TO:  Juan Connors MD  [address]  [address] [place] [address] Road  [phone]    Follow up in 1 week(s)  Hospital follow up    DISCHARGE MEDICATIONS:  Current Discharge Medication List          DISPOSITION Admitted 08/18/2022 12:49:56 AM         Sabino Higgins MD  Resident  08/18/22 2038

## 2022-08-18 ENCOUNTER — APPOINTMENT (OUTPATIENT)
Dept: MRI IMAGING | Age: 87
DRG: 310 | End: 2022-08-18
Payer: MEDICARE

## 2022-08-18 PROBLEM — I62.9 INTRACRANIAL HEMORRHAGE (HCC): Status: ACTIVE | Noted: 2022-08-18

## 2022-08-18 PROBLEM — D18.02 INTRACRANIAL HEMANGIOMA (HCC): Status: ACTIVE | Noted: 2022-08-18

## 2022-08-18 LAB
ANION GAP SERPL CALCULATED.3IONS-SCNC: 13 MMOL/L (ref 3–16)
BASOPHILS ABSOLUTE: 0.1 K/UL (ref 0–0.2)
BASOPHILS RELATIVE PERCENT: 1.5 %
BUN BLDV-MCNC: 25 MG/DL (ref 7–20)
CALCIUM SERPL-MCNC: 9 MG/DL (ref 8.3–10.6)
CHLORIDE BLD-SCNC: 97 MMOL/L (ref 99–110)
CO2: 26 MMOL/L (ref 21–32)
CREAT SERPL-MCNC: 1.3 MG/DL (ref 0.8–1.3)
EOSINOPHILS ABSOLUTE: 0.5 K/UL (ref 0–0.6)
EOSINOPHILS RELATIVE PERCENT: 5.3 %
ESTIMATED AVERAGE GLUCOSE: 182.9 MG/DL
GFR AFRICAN AMERICAN: >60
GFR NON-AFRICAN AMERICAN: 52
GLUCOSE BLD-MCNC: 116 MG/DL (ref 70–99)
GLUCOSE BLD-MCNC: 144 MG/DL (ref 70–99)
GLUCOSE BLD-MCNC: 178 MG/DL (ref 70–99)
GLUCOSE BLD-MCNC: 191 MG/DL (ref 70–99)
HBA1C MFR BLD: 8 %
HCT VFR BLD CALC: 32.5 % (ref 40.5–52.5)
HEMOGLOBIN: 10.6 G/DL (ref 13.5–17.5)
LYMPHOCYTES ABSOLUTE: 0.8 K/UL (ref 1–5.1)
LYMPHOCYTES RELATIVE PERCENT: 9.4 %
MCH RBC QN AUTO: 29.2 PG (ref 26–34)
MCHC RBC AUTO-ENTMCNC: 32.6 G/DL (ref 31–36)
MCV RBC AUTO: 89.6 FL (ref 80–100)
MONOCYTES ABSOLUTE: 1 K/UL (ref 0–1.3)
MONOCYTES RELATIVE PERCENT: 11.7 %
NEUTROPHILS ABSOLUTE: 6.4 K/UL (ref 1.7–7.7)
NEUTROPHILS RELATIVE PERCENT: 72.1 %
PDW BLD-RTO: 16.5 % (ref 12.4–15.4)
PERFORMED ON: ABNORMAL
PLATELET # BLD: 276 K/UL (ref 135–450)
PMV BLD AUTO: 8 FL (ref 5–10.5)
POTASSIUM REFLEX MAGNESIUM: 3.8 MMOL/L (ref 3.5–5.1)
RBC # BLD: 3.63 M/UL (ref 4.2–5.9)
SODIUM BLD-SCNC: 136 MMOL/L (ref 136–145)
WBC # BLD: 8.9 K/UL (ref 4–11)

## 2022-08-18 PROCEDURE — 97530 THERAPEUTIC ACTIVITIES: CPT

## 2022-08-18 PROCEDURE — 2580000003 HC RX 258: Performed by: INTERNAL MEDICINE

## 2022-08-18 PROCEDURE — 1200000000 HC SEMI PRIVATE

## 2022-08-18 PROCEDURE — G0378 HOSPITAL OBSERVATION PER HR: HCPCS

## 2022-08-18 PROCEDURE — 70551 MRI BRAIN STEM W/O DYE: CPT

## 2022-08-18 PROCEDURE — 85025 COMPLETE CBC W/AUTO DIFF WBC: CPT

## 2022-08-18 PROCEDURE — 97535 SELF CARE MNGMENT TRAINING: CPT

## 2022-08-18 PROCEDURE — 94640 AIRWAY INHALATION TREATMENT: CPT

## 2022-08-18 PROCEDURE — APPNB60 APP NON BILLABLE TIME 46-60 MINS

## 2022-08-18 PROCEDURE — 36415 COLL VENOUS BLD VENIPUNCTURE: CPT

## 2022-08-18 PROCEDURE — 72141 MRI NECK SPINE W/O DYE: CPT

## 2022-08-18 PROCEDURE — 6370000000 HC RX 637 (ALT 250 FOR IP): Performed by: INTERNAL MEDICINE

## 2022-08-18 PROCEDURE — 97162 PT EVAL MOD COMPLEX 30 MIN: CPT

## 2022-08-18 PROCEDURE — 6360000002 HC RX W HCPCS: Performed by: INTERNAL MEDICINE

## 2022-08-18 PROCEDURE — 83036 HEMOGLOBIN GLYCOSYLATED A1C: CPT

## 2022-08-18 PROCEDURE — 80061 LIPID PANEL: CPT

## 2022-08-18 PROCEDURE — 97166 OT EVAL MOD COMPLEX 45 MIN: CPT

## 2022-08-18 PROCEDURE — 97116 GAIT TRAINING THERAPY: CPT

## 2022-08-18 PROCEDURE — 80048 BASIC METABOLIC PNL TOTAL CA: CPT

## 2022-08-18 RX ORDER — BUDESONIDE 0.25 MG/2ML
0.25 INHALANT ORAL 2 TIMES DAILY
Status: DISCONTINUED | OUTPATIENT
Start: 2022-08-18 | End: 2022-08-23 | Stop reason: HOSPADM

## 2022-08-18 RX ORDER — SODIUM CHLORIDE 9 MG/ML
INJECTION, SOLUTION INTRAVENOUS PRN
Status: DISCONTINUED | OUTPATIENT
Start: 2022-08-18 | End: 2022-08-23 | Stop reason: HOSPADM

## 2022-08-18 RX ORDER — INSULIN LISPRO 100 [IU]/ML
0-4 INJECTION, SOLUTION INTRAVENOUS; SUBCUTANEOUS NIGHTLY
Status: DISCONTINUED | OUTPATIENT
Start: 2022-08-18 | End: 2022-08-23 | Stop reason: HOSPADM

## 2022-08-18 RX ORDER — SODIUM CHLORIDE 0.9 % (FLUSH) 0.9 %
5-40 SYRINGE (ML) INJECTION EVERY 12 HOURS SCHEDULED
Status: DISCONTINUED | OUTPATIENT
Start: 2022-08-18 | End: 2022-08-23 | Stop reason: HOSPADM

## 2022-08-18 RX ORDER — LEVOTHYROXINE SODIUM 0.07 MG/1
75 TABLET ORAL
Status: DISCONTINUED | OUTPATIENT
Start: 2022-08-18 | End: 2022-08-23 | Stop reason: HOSPADM

## 2022-08-18 RX ORDER — INSULIN LISPRO 100 [IU]/ML
0-4 INJECTION, SOLUTION INTRAVENOUS; SUBCUTANEOUS
Status: DISCONTINUED | OUTPATIENT
Start: 2022-08-18 | End: 2022-08-23 | Stop reason: HOSPADM

## 2022-08-18 RX ORDER — DEXTROSE MONOHYDRATE 100 MG/ML
INJECTION, SOLUTION INTRAVENOUS CONTINUOUS PRN
Status: DISCONTINUED | OUTPATIENT
Start: 2022-08-18 | End: 2022-08-23 | Stop reason: HOSPADM

## 2022-08-18 RX ORDER — BRIMONIDINE TARTRATE, TIMOLOL MALEATE 2; 5 MG/ML; MG/ML
1 SOLUTION/ DROPS OPHTHALMIC EVERY 12 HOURS
Status: DISCONTINUED | OUTPATIENT
Start: 2022-08-18 | End: 2022-08-18 | Stop reason: CLARIF

## 2022-08-18 RX ORDER — SODIUM CHLORIDE 0.9 % (FLUSH) 0.9 %
5-40 SYRINGE (ML) INJECTION PRN
Status: DISCONTINUED | OUTPATIENT
Start: 2022-08-18 | End: 2022-08-23 | Stop reason: HOSPADM

## 2022-08-18 RX ORDER — BRIMONIDINE TARTRATE 2 MG/ML
1 SOLUTION/ DROPS OPHTHALMIC 2 TIMES DAILY
Status: DISCONTINUED | OUTPATIENT
Start: 2022-08-18 | End: 2022-08-23 | Stop reason: HOSPADM

## 2022-08-18 RX ORDER — ACETAMINOPHEN 325 MG/1
650 TABLET ORAL EVERY 6 HOURS PRN
Status: DISCONTINUED | OUTPATIENT
Start: 2022-08-18 | End: 2022-08-23 | Stop reason: HOSPADM

## 2022-08-18 RX ORDER — ONDANSETRON 2 MG/ML
4 INJECTION INTRAMUSCULAR; INTRAVENOUS EVERY 6 HOURS PRN
Status: DISCONTINUED | OUTPATIENT
Start: 2022-08-18 | End: 2022-08-23 | Stop reason: HOSPADM

## 2022-08-18 RX ORDER — CARVEDILOL 3.12 MG/1
3.12 TABLET ORAL 2 TIMES DAILY WITH MEALS
Status: DISCONTINUED | OUTPATIENT
Start: 2022-08-18 | End: 2022-08-23 | Stop reason: HOSPADM

## 2022-08-18 RX ORDER — ONDANSETRON 4 MG/1
4 TABLET, ORALLY DISINTEGRATING ORAL EVERY 8 HOURS PRN
Status: DISCONTINUED | OUTPATIENT
Start: 2022-08-18 | End: 2022-08-23 | Stop reason: HOSPADM

## 2022-08-18 RX ORDER — LATANOPROST 50 UG/ML
1 SOLUTION/ DROPS OPHTHALMIC DAILY
Status: DISCONTINUED | OUTPATIENT
Start: 2022-08-18 | End: 2022-08-23 | Stop reason: HOSPADM

## 2022-08-18 RX ORDER — TIMOLOL MALEATE 5 MG/ML
1 SOLUTION/ DROPS OPHTHALMIC 2 TIMES DAILY
Status: DISCONTINUED | OUTPATIENT
Start: 2022-08-18 | End: 2022-08-23 | Stop reason: HOSPADM

## 2022-08-18 RX ORDER — POLYETHYLENE GLYCOL 3350 17 G/17G
17 POWDER, FOR SOLUTION ORAL DAILY PRN
Status: DISCONTINUED | OUTPATIENT
Start: 2022-08-18 | End: 2022-08-23 | Stop reason: HOSPADM

## 2022-08-18 RX ORDER — ACETAMINOPHEN 650 MG/1
650 SUPPOSITORY RECTAL EVERY 6 HOURS PRN
Status: DISCONTINUED | OUTPATIENT
Start: 2022-08-18 | End: 2022-08-23 | Stop reason: HOSPADM

## 2022-08-18 RX ADMIN — TIMOLOL MALEATE 1 DROP: 5 SOLUTION OPHTHALMIC at 22:42

## 2022-08-18 RX ADMIN — CARVEDILOL 3.12 MG: 3.12 TABLET, FILM COATED ORAL at 10:48

## 2022-08-18 RX ADMIN — SODIUM CHLORIDE, PRESERVATIVE FREE 10 ML: 5 INJECTION INTRAVENOUS at 09:00

## 2022-08-18 RX ADMIN — TIMOLOL MALEATE 1 DROP: 5 SOLUTION OPHTHALMIC at 10:48

## 2022-08-18 RX ADMIN — LATANOPROST 1 DROP: 50 SOLUTION OPHTHALMIC at 10:48

## 2022-08-18 RX ADMIN — BRIMONIDINE TARTRATE 1 DROP: 2 SOLUTION OPHTHALMIC at 22:42

## 2022-08-18 RX ADMIN — BRIMONIDINE TARTRATE 1 DROP: 2 SOLUTION OPHTHALMIC at 10:48

## 2022-08-18 RX ADMIN — SODIUM CHLORIDE, PRESERVATIVE FREE 10 ML: 5 INJECTION INTRAVENOUS at 21:19

## 2022-08-18 RX ADMIN — BUDESONIDE 250 MCG: 0.25 SUSPENSION RESPIRATORY (INHALATION) at 08:31

## 2022-08-18 RX ADMIN — BUDESONIDE 250 MCG: 0.25 SUSPENSION RESPIRATORY (INHALATION) at 20:21

## 2022-08-18 RX ADMIN — CARVEDILOL 3.12 MG: 3.12 TABLET, FILM COATED ORAL at 18:18

## 2022-08-18 RX ADMIN — LEVOTHYROXINE SODIUM 75 MCG: 0.07 TABLET ORAL at 05:28

## 2022-08-18 ASSESSMENT — PAIN SCALES - GENERAL
PAINLEVEL_OUTOF10: 0

## 2022-08-18 NOTE — CARE COORDINATION
Case Management Assessment           Initial Evaluation                Date / Time of Evaluation: 8/18/2022 10:22 AM                 Assessment Completed by: Tad Monsivais RN    Patient Name: Dana Cox     YOB: 1935  Diagnosis: Intracranial hemorrhage ([address] [I62.9]  Falls frequently [R29.6]  Abdominal aortic aneurysm (AAA) without rupture [facility]) [I71.4]     Date / Time: 8/17/2022  4:16 PM    Patient Admission Status: Inpatient    If patient is discharged prior to next notation, then this note serves as note for discharge by case management. Current PCP: Libra Galvan MD  Clinic Patient: No    Chart Reviewed: Yes  Patient/ Family Interviewed: Yes    Initial assessment completed at bedside with: family    Hospitalization in the last 30 days: Yes    Emergency Contacts:  Extended Emergency Contact Information  Primary Emergency Contact: Braden Cunningham   [address] Phone: [phone]  Relation: Child    Advance Directives:   Code Status: Full Code      Financial  Payor: Kate Carvajal / Plan: [address] [place] [address] HMO / Product Type: Medicare /     Pre-cert required for SNF: Yes    Pharmacy    [facility] [id number]  [place] [address] [phone] [facility] [phone]  [address] [place] [address]  Phone: [phone] Fax: [phone]      Potential assistance Purchasing Medications: Potential Assistance Purchasing Medications: No  Does Patient want to participate in local refill/ meds to beds program?:      Meds To Beds General Rules:  1. Can ONLY be done Monday- Friday between 8:30am-5pm  2. Prescription(s) must be in pharmacy by 3pm to be filled same day  3. Copy of patient's insurance/ prescription drug card and patient face sheet must be sent along with the prescription(s)  4. Cost of Rx cannot be added to hospital bill. If financial assistance is needed, please contact unit  or ;   or  CANNOT provide pharmacy voucher for patients co-pays  5. Patients can then  the prescription on their way out of the hospital at discharge, or pharmacy can deliver to the bedside if staff is available. (payment due at time of pick-up or delivery - cash, check, or card accepted)     Able to afford home medications/ co-pay costs: snf    ADLS  Support Systems: Children    PT AM-PAC: 15 /24  OT AM-PAC: 17 /24    [place]: live sw/ friend  Steps: lives on main level of home    Plans to RETURN to current housing: No  Barriers to RETURNING to current housing: safety and steps,   Lives With: Friend(s) (Son's friend, Cherylene Rogue, who is 52 and medically released from the [employer]. Unable to assist pt at home.)  Type of Home: House  Home Layout: Two level, Able to Live on Main level with bedroom/bathroom  Home Access: Stairs to enter without rails (1 step)  Bathroom Shower/Tub: Walk-in shower, Shower chair with back  Bathroom Toilet: Standard  Bathroom Equipment: Grab bars in shower, Shower chair, Grab bars around toilet  Home Equipment: Rollator, Cane, Reacher, Oxygen (2L O2)  Has the patient had two or more falls in the past year or any fall with injury in the past year?: Yes (4 falls since being discharged from [facility] on [facility]  ADL Assistance: Needs assistance (has a lady come 2x a week for showers)  Homemaking Assistance: Needs assistance (Friend does homemaking)  Ambulation Assistance: Independent (using rollator)  Transfer Assistance: Independent  Active : No  Occupation: Retired ([address] work)  Leisure & Hobbies: go shooting  Additional Comments: Son reports friend is not capable of assisting patient at home.         Home Care Information  Currently ACTIVE with Home Health Care: {Yes/No:19726}  Home Care Agency: [facility] Providers:02011}    Currently ACTIVE with [place] on Aging: {Yes/No:19726}  Passport/ Waiver: {Yes/No:19726}  Passport/ Waiver Services: { COA Services:87890}    : *** Phone: ***      Durable Medical Equipment  DME Provider: ***  Equipment: {EQUIPMENT:834963248}    Home Oxygen and Respiratory Equipment  Has HOME OXYGEN prior to admission: {Yes/No:19726}  [address]: { Oxygen Companies:77480}  Other Respiratory Equipment: ***    Informed of need to bring portable home O2 tank on day of DISCHARGE for nursing to connect prior to leaving: {Yes/No:19726}  Verbalized agreement/Understanding: {Yes/No:19726}  Person to bring portable tank at discharge: ***    Dialysis  Active with HD/PD prior to admission: {Yes/No:19726}  Nephrologist: ***    HD Center:  { HD Facilities:89833}    DISCHARGE PLAN:  Disposition: { DISPOSITION:08245}    Transportation PLAN for discharge: {LOJ:61402}     Factors facilitating achievement of predicted outcomes: {Patient Strengths:840011850}    Barriers to discharge: {Barriers:053703545}    Additional Case Management Notes: Patient on 2L O2 at present time. Patient in past has had O2 at home. Family has asked that [facility] be called for placement of patient. Today's PT eval is 15/24 [facility] and OT is 17/24. This  called and sent referral to [facility] for placement. The Plan for Transition of Care is related to the following treatment goals of Intracranial hemorrhage ([address] [I62.9]  Falls frequently [R29.6]  Abdominal aortic aneurysm (AAA) without rupture ([address] [I71.4]    The Patient and/or patient representative Rc Mcelroy and his family were provided with a choice of provider and agrees with the discharge plan {RESPONSES; Yes/No/Not Indicated:90034}    Freedom of choice list was provided with basic dialogue that supports the patient's individualized plan of care/goals and shares the quality data associated with the providers.  {RESPONSES; Yes/No/Not Indicated:56384}    Care Transition patient: {Yes/No:19726}    Krista Gross RN  [facility]  Case Management Department  Ph: ***   Fax: ***

## 2022-08-18 NOTE — CONSULTS
Neurology / Erin Arden Consult Note    Gladys Arana MD is requesting this consult. Reason for Consult: New stroke on MRI  Admission Chief Complaint: Fatigue    History of Present Illness     Lars Zamorano is a 82 y.o. y/o male with PMH significant for hypertension, HLD, CHF, afib on eliquis, and recent IVH. Per my interview with the patient and his son at bedside, he has fallen four times since discharge. Reports he does not feel dizzy or lightheaded before these falls and he may feel slightly off balance. Reports the way he falls varies, patient thinks he mostly falls backwards but did fall forwards prior to his initial presentation with IVH. Says he will be walking and then will just fall down, but son reports that the 4th fall occurred while he was trying to get out of bed. Reports generalized weakness in his legs that has been going on since January when he \"was reported to have a stroke\". Reports that his gait has not necessarily changed, just feels like the strength hasn't been able to get back. Son reports after this last fall he seemed confused. Reports that he has had to use a wheelchair to get around for the most part. Reports that for the past month he has also had urinary incontinence. Will be incontinent sometimes while trying to make it to the bathroom but other times he will not know if he needed. For the past couple of days, the incontinence has worsened. No bowel incontinence per patient, but son reports that yesterday prior to his clinic visit he was having a bowel movement and after he got dressed an sat in a wheelchair had another bowel movement. Patient originally presented after a fall onto his L side where he was found to have acute IVH in the L lateral ventricle on 8/13. After he was discharged from the hospital, it was reported that he had fallen four more times.  At his follow up appointment with Neurosurgery it was reported he had been sleepier than usual and with frequent falls so was sent to the Hospital for further work up. CT head showed persistent IVH, CT cervical, thoracic, and lumbar spine completed with no acute fracture. MRI lumbar spine with multilevel discogenic/spondylotic changes with multilevel canal and foraminal narrowing, thoracic spine MRI unremarkable. MRI Brain with intraventricular hemorrhage similar to CT head, small ischemic infarct in R frontal cortex, and flair hyperintensity compatible with some subarachnoid hemorrhage. REVIEW OF SYSTEMS:   Constitutional- No weight loss or fevers   Eyes- No diplopia. No photophobia. Ears/nose/throat- No dysphagia. No Dysarthria   Cardiovascular- No palpitations. No chest pain   Respiratory- No dyspnea. + Cough (baseline)  Gastrointestinal- No Abdominal pain. No Vomiting. Genitourinary- + incontinence. No urinary retention   Musculoskeletal- No myalgia. No arthralgia   Skin- No rash. No easy bruising. Psychiatric- No depression. No anxiety   Endocrine- No diabetes. No thyroid issues. Hematologic- No bleeding difficulty.  No fatigue   Neurologic- No headache. +weakness in BLEs    Past Medical, Surgical, Family, and Social History   PAST MEDICAL HISTORY:  Past Medical History:   Diagnosis Date    Atrial fibrillation (HCC)     CHF (congestive heart failure) (HCC)     Hypertension     Thyroid disease      SURGICAL HISTORY:  Past Surgical History:   Procedure Laterality Date    CARPAL TUNNEL RELEASE Bilateral     CYSTOSCOPY      LUMBAR SPINE SURGERY       FAMILY HISTORY & SOCIAL HISTORY:  Family history non-contributory  Family History   Family history unknown: Yes     Social History     Tobacco Use    Smoking status: Former    Smokeless tobacco: Former   Substance Use Topics    Alcohol use: Not Currently         Allergies & Outpatient Medications   ALLERGIES:  Allergies   Allergen Reactions    Naproxen Rash     HOME MEDICATIONS:  Current Discharge Medication List        CONTINUE these medications which have NOT CHANGED    Details   Probiotic Product (PROBIOTIC ADVANCED PO) Take 1 caplet by mouth daily      furosemide (LASIX) 40 MG tablet Take 40 mg by mouth in the morning. dilTIAZem (CARDIZEM) 30 MG tablet Take 30 mg by mouth in the morning. PRN HR over 100 for more than 10 min.      potassium chloride (MICRO-K) 10 MEQ extended release capsule Take 10 mEq by mouth in the morning. cefUROXime (CEFTIN) 500 MG tablet Take 1 tablet by mouth in the morning and 1 tablet before bedtime. Do all this for 10 days. Qty: 20 tablet, Refills: 0      metroNIDAZOLE (FLAGYL) 500 MG tablet Take 1 tablet by mouth in the morning and 1 tablet at noon and 1 tablet before bedtime. Do all this for 10 days. Qty: 30 tablet, Refills: 0      carvedilol (COREG) 3.125 MG tablet Take 3.125 mg by mouth in the morning and 3.125 mg in the evening. Take with meals. Cholecalciferol (VITAMIN D3) 50 MCG (2000 UT) CAPS Take by mouth 2 times daily      valsartan (DIOVAN) 40 MG tablet Take 40 mg by mouth in the morning.       fluticasone-umeclidin-vilant (TRELEGY ELLIPTA) 100-62.5-25 MCG/INH AEPB Inhale 1 puff into the lungs daily      levothyroxine (SYNTHROID) 75 MCG tablet Take 75 mcg by mouth Daily      JANUVIA 50 MG tablet Take 50 mg by mouth daily       COMBIGAN 0.2-0.5 % ophthalmic solution Place 1 drop into both eyes every 12 hours       latanoprost (XALATAN) 0.005 % ophthalmic solution Place 1 drop into both eyes daily       Multiple Vitamin (MULTIVITAMIN) tablet Take 1 tablet by mouth daily               Physical Exam   PHYSICAL EXAM:  Vitals:    08/18/22 0834 08/18/22 0930 08/18/22 1048 08/18/22 1223   BP:  (!) 146/72 (!) 146/72 109/63   Pulse:  86 83 77   Resp:  18  18   Temp:  97.8 °F (36.6 °C)  97.6 °F (36.4 °C)   TempSrc:  Axillary  Oral   SpO2: 95% 93%  94%   Weight:       Height:             General: Alert, no distress, well-nourished  Neurologic  Mental status:   orientation to person, place, time, situation   Attention intact as able to attend well to the exam     Language fluent in conversation   Comprehension intact; follows simple commands    Cranial nerves:   CN2: Visual fields full w/o extinction on confrontational testing   CN 3,4,6: Pupils equal and reactive to light, extraocular muscles intact  CN5: Facial sensation symmetric   CN7: Face symmetric  CN8: Hearing symmetric to spoken voice  CN9: Palate elevated symmetrically  CN11: Traps full strength on shoulder shrug  CN12: Tongue midline with protrusion    Motor Exam:   R  L    Deltoid 5  5   Biceps 5 5   Triceps 5 5   Wrist extension  5 5   Interossei 5 5      R  L    Hip flexion  5  5   Hip extension  5 5   Knee flexion  5 5   Knee extension  5 5   Ankle dorsiflexion  5 5   Ankle plantar flexion  5 5     Reflexes: +2 and symmetric patellar reflexes, reflexes in BUE +1 and symmetric    Sensory: light touch intact and symmetric in all 4 extremities. No sensory extinction on bilateral simultaneous stimulation  Cerebellar/coordination: finger nose finger slow but normal, heel shin with some ataxia bilaterally  Tone: normal in all 4 extremities  Gait: held for patient safety    OTHER SYSTEMS:  Cardiovascular: Warm, appears well perfused   Respiratory: Easy, non-labored respiratory pattern   Abdominal: Abdomen is without distention   Extremities: Upper and lower extremities are atraumatic in appearance without deformity. No swelling or erythema. Diagnostic Testing Results   IMAGES:  Images personally reviewed and agree w/ radiology interpretation. Head CT w/o Contrast:  Impression       Persistent intraventricular hemorrhage with small amount of suspected parenchymal bleed along the lateral aspect of the posterior horn of the left lateral ventricle. The extent of the hemorrhage has decreased in comparison the prior study but interval    rebleed is a potential consideration. MRI Brain w/o Contrast:  Impression   1.  Small amount of hemorrhage layering in the occipital horns of the bilateral lateral ventricles. FLAIR hyperintensity also noted in the bilateral subarachnoid space, compatible with some subarachnoid hemorrhage, within the mid and posterior/dependent    brain, involving bilateral frontal, parietal, occipital and temporal regions. 2. Susceptibility artifact along the lateral aspect of the left lateral ventricle body, most compatible with subependymal parenchymal hemorrhage, similar to prior CT. 3. Diffusion restriction compatible with recent small ischemic infarct along the medial right frontal cortex. 4. Moderate to severe diffuse cerebral atrophy with superimposed moderate to severe small vessel ischemic disease. MRI Lumbar Spine:     FINDINGS:       There is normal alignment of the visualized vertebrae with no evidence of acute fracture or traumatic subluxation. No abnormal marrow signal is seen to suggest metastatic disease. No abnormal signal is seen in the visualized spinal cord. The L1-2 disc level shows no significant disc bulge, canal, or foraminal narrowing. At L2-3 there is disc flattening with broad-based disc protrusion with facet arthropathy. There is a moderate degree of resulting multifactorial canal stenosis. There is mild to moderate bilateral foraminal narrowing, left greater than right. At L3-4, there is a broad-based disc bulge with bilateral facet disease and ligamentous hypertrophy. There is moderate to severe resulting central canal stenosis. There is mild to moderate left-sided foraminal narrowing with no significant right-sided    foraminal stenosis. At the L4-5 level there is broad-based disc bulge with bilateral facet disease and ligamentous hypertrophy resulting in moderate central canal stenosis. There is mild narrowing of the left neural foramen with no right-sided foraminal stenosis. At L5-S1 there is disc flattening with broad-based disc bulge and bilateral facet disease. There is mild resulting central canal stenosis. There is mild to moderate right-sided foraminal narrowing with moderate stenosis of the left neural foramen. No abnormal enhancement is seen following contrast administration. There is mild focal dilatation of distal abdominal aorta measuring approximately 3.2 cm but this is incompletely visualized. Impression       Multilevel discogenic/spondylotic changes with multilevel canal and foraminal narrowing as above. Abdominal aortic aneurysm. No abnormal enhancement following contrast administration. MRI Thoracic Spine:  Impression       Unremarkable thoracic spine MRI. LABS:  All results below personally reviewed. Pertinent positives & negatives are addressed in Impression & Recommendations below. LABS   Metabolic Panel Recent Labs     08/17/22  1526 08/18/22  0420   * 136   K 4.3 3.8   CL 93* 97*   CO2 30 26   BUN 28* 25*   CREATININE 1.4* 1.3   GLUCOSE 160* 116*   CALCIUM 9.3 9.0      CBC / Coags Recent Labs     08/17/22  1526 08/17/22  1937/22  0420   WBC 8.2  --  8.9   RBC 3.65*  --  3.63*   HGB 10.7*  --  10.6*   HCT 32.2*  --  32.5*     --  276   INR  --  1.18*  --       Other Recent Labs     08/17/22  1526   LABA1C 8.0     No results for input(s): PHENYTOIN, KEPPRA, LACOSA, LAMO, VALPROATE, LACTSEPSIS, LACTA in the last 72 hours.        CURRENT SCHEDULED MEDICATIONS   Inpatient Medications     carvedilol, 3.125 mg, Oral, BID WC    latanoprost, 1 drop, Both Eyes, Daily    levothyroxine, 75 mcg, Oral, QAM AC    sodium chloride flush, 5-40 mL, IntraVENous, 2 times per day    insulin lispro, 0-4 Units, SubCUTAneous, TID WC    insulin lispro, 0-4 Units, SubCUTAneous, Nightly    brimonidine, 1 drop, Both Eyes, BID **AND** timolol, 1 drop, Both Eyes, BID    budesonide, 0.25 mg, Nebulization, BID **AND** tiotropium-olodaterol, 2 puff, Inhalation, Daily   Infusions    dextrose      sodium chloride Antibiotics   Recent Abx Admin        No antibiotic orders with administrations found. IMPRESSION & RECOMMENDATIONS     IMPRESSION:  Uzair Lofton is a 82 y.o. y/o male with PMH significant for hypertension, HLD, CHF, afib on eliquis, and recent IVH who presented after frequent falls and increased incontinence at home. He was found to have a small stroke on his MRI, he has been off of anticoagulation for his atrial fibrillation. He has some decreased coordination in his lower extremities but strength and tone as well as reflexes are normal    RECOMMENDATIONS:    Ischemic Stroke on MRI  - Patient with history of Afib, off anticoagulation due to IVH on Saturday  - Holding all anticoagulation/antiplatelets for two weeks (or per Neurosurgery) given recent IVH  - Recommend initiating low intensity statin  - Telemetry while inpatient  - Lipid panel and Hgb A1c if not recently completed  - PT/OT/SLP  - Last ECHO in 12/2021, demonstrated EF of 55 - 60%, consider repeating ECHO with bubble study    Frequent Falls:  - Reporting generalized weakness in legs since January - he reports this started after a stroke though I cannot find a record of this, it does appear he had an ICU admission at [facility] in December of 2021 where he required high flow oxygen and a stay in extended care facility afterwards.  Hard to say if there is any one thing causing his frequent falls, may be combination of deconditioning and recent bleed.  - PT/OT eval and treat  - Patient has a roommate who is unable to assist with ADLs, may need to consider placement    IVH:  - Discussed CT head and MRI with Neurosurgery, believe bleed is stable  - Q4 hour neuro checks  - No anticoagulation/antiplatelets for two weeks  - If changes in exam, please notify Neurology    VALERIO Cheatham Mai - CNP   Neurology & Neurocritical Care   Neurology Line: [phone]  [employer]: [facility] NPs  8/18/2022 2:17 PM    I spent 55 minutes in the care of this patient. Over 50% of that time was in face-to-face counseling regarding disease process, diagnostic testing, preventative measures, and answering patient and family questions.

## 2022-08-18 NOTE — PROGRESS NOTES
Occupational Therapy  Facility/Department: [place] [address] [facility]  Occupational Therapy Initial Assessment/Treatment    Name: Uzair Lofton  : 1935  MRN: [id number]  Date of Service: 2022    Discharge Recommendations:  Uzair Lofton scored a 17/24 on the AM-PAC ADL Inpatient form. Current research shows that an AM-PAC score of 17 or less is typically not associated with a discharge to the patient's home setting. Based on the patient's AM-PAC score and their current ADL deficits, it is recommended that the patient have 5-7 sessions per week of Occupational Therapy at d/c to increase the patient's independence. At this time, this patient demonstrates complex nursing, medical, and rehabilitative needs, and would benefit from intensive rehabilitation services upon discharge from the Inpatient setting. This patient demonstrates the ability to participate in and benefit from an intensive therapy program with a coordinated interdisciplinary team approach to foster frequent, structured, and documented communication among disciplines, who will work together to establish, prioritize, and achieve treatment goals. Please see assessment section for further patient specific details. If patient discharges prior to next session this note will serve as a discharge summary. Please see below for the latest assessment towards goals. OT Equipment Recommendations  Equipment Needed: No  Other: defer to next level of care       Patient Diagnosis(es): The primary encounter diagnosis was Intracranial hemorrhage ([address] Diagnoses of Falls frequently and Abdominal aortic aneurysm (AAA) without rupture ([address] were also pertinent to this visit. Past Medical History:  has a past medical history of Atrial fibrillation ([address] CHF (congestive heart failure) ([address] Hypertension, and Thyroid disease. Past Surgical History:  has a past surgical history that includes Carpal tunnel release (Bilateral);  Lumbar spine surgery; and Cystoscopy. Treatment Diagnosis: impaired ADLs and functional mobility/transfers      Assessment   Performance deficits / Impairments: Decreased functional mobility ; Decreased endurance;Decreased ADL status; Decreased balance;Decreased safe awareness;Decreased cognition;Decreased strength;Decreased coordination  Assessment: Pt re-admitted from home with 4 falls since being d/c'd home 8/15 with ICH. Pt groggy with noted confusion. Pt needing increased assist for transfers and demonstrates ataxic gait with use of rolling walker this date. Pt's friend at home is unable to provide adequate assistance. Pt is below reported baseline and would benefit from continued IP OT to maximize safety and functional independence w/ ADLs, mobility, and transfers. Will continue to follow for ongoing OT. Treatment Diagnosis: impaired ADLs and functional mobility/transfers  Prognosis: Good  Decision Making: Medium Complexity  REQUIRES OT FOLLOW-UP: Yes  Activity Tolerance  Activity Tolerance: Patient limited by fatigue;Treatment limited secondary to decreased cognition;Patient Tolerated treatment well        Plan   Plan  Times per Week: 5-7  Times per Day: Daily  Current Treatment Recommendations: Strengthening, Balance training, Functional mobility training, Endurance training, Patient/Caregiver education & training, Equipment evaluation, education, & procurement, Safety education & training, Self-Care / ADL     Restrictions  Position Activity Restriction  Other position/activity restrictions: Up with assist    Subjective   General  Chart Reviewed: Yes  Patient assessed for rehabilitation services?: Yes  Additional Pertinent Hx: Pt to ED 8/17 with ICH and falls. Pt discharged home from [facility] 8/15 with [address] and continued decline at home, falling 4 times. Head Imgaing: shows hemorrhage has decreased. Spine Imaging: Multilevel discogenic/spondylotic changes. Neurosurgery consult.   PMH:  Afib, CHF, HTN, Covid-19, falls, ICH  Family / Caregiver Present: Yes (pt's son arriving at end of session)  Referring Practitioner: Priscila Mendes MD  Diagnosis: Intracranial hemorrhage  Subjective  Subjective: Pt supine in bed, sleeping upon arrival. Pt easily awakened however very lethargic. Pt w/ increased alertness after sitting EOB. Pt pleasant and agreeable to OT eval and treat     Social/Functional History  Social/Functional History  Lives With: Friend(s) (Son's friend, Mac Mcpherson, who is 52 and medically released from the [employer]. Unable to assist pt at home.)  Type of Home: House  Home Layout: Two level, Able to Live on Main level with bedroom/bathroom  Home Access: Stairs to enter without rails (1 step)  Bathroom Shower/Tub: Walk-in shower, Shower chair with back  Bathroom Toilet: Standard  Bathroom Equipment: Grab bars in shower, Shower chair, Grab bars around toilet  Home Equipment: Rollator, Cane, Reacher, Oxygen (2L O2)  Has the patient had two or more falls in the past year or any fall with injury in the past year?: Yes (4 falls since being discharged from [facility] on 8/15.)  ADL Assistance: Needs assistance (has a lady come 2x a week for showers)  Homemaking Assistance: Needs assistance (Friend does homemaking)  Ambulation Assistance: Independent (using rollator)  Transfer Assistance: Independent  Active : No  Occupation: Retired ([employer] work)  Leisure & Hobbies: go shooting  Additional Comments: Son reports friend is not capable of assisting patient at home. Objective   Heart Rate: 83  Heart Rate Source: Monitor  BP: (!) 146/72  BP Location: Right upper arm  BP Method: Automatic  Patient Position: Semi fowlers  MAP (Calculated): 96.67  Resp: 18  SpO2: 93 %  O2 Device: Nasal cannula             Safety Devices  Type of Devices: Left in chair;Chair alarm in place;Call light within reach;Nurse notified (son in room)  Balance  Sitting:  (SBA)  Standing:  (Mod A-Min A)  Gait  Overall Level of Assistance:  Moderate assistance (functional mobility in room w/ RW w/ Mod A)  Gait Abnormalities: Ataxic  Assistive Device: Walker, rolling  Toilet Transfers  Toilet Transfer: Unable to assess  Toilet Transfers Comments: pt declined  AROM: Generally decreased, functional  Strength: Generally decreased, functional  Coordination: Grossly decreased, non-functional (during gait)  Sensation: Intact (per pt report)  ADL  Feeding: Independent  Feeding Skilled Clinical Factors: pt eating breakfast at end of session, able to open containers and utilizie utensils w/o assist.  Toileting Skilled Clinical Factors: pt declining need  Additional Comments: Pt declined participation in additional ADLs this date. Based on balance deficits noted during bed mobility and functional mobility w/ RW, anticipate pt will require increased assist for all ADLs        Bed mobility  Supine to Sit: Minimal assistance (HOB raised, max verbal cues, increased time)  Transfers  Sit to stand: Minimal assistance  Stand to sit: Minimal assistance  Transfer Comments: VC for hand placement and anterior WS  Vision - Basic Assessment  Prior Vision: Wears glasses all the time  Patient Visual Report: Other (add comment) (pt reporting change in vision, however unable to describe)  Oculo Motor Control: Impaired  Impairments: Tracking;Smooth Pursuits (pt w/ mild difficulty w/ tracking and visual attention)  Vision  Vision: Impaired  Vision Exceptions: Wears glasses at all times  Hearing  Hearing: Exceptions to [facility]  Hearing Exceptions: Bilateral hearing aid  Cognition  Overall Cognitive Status: Exceptions  Arousal/Alertness: Delayed responses to stimuli  Following Commands: Follows one step commands with increased time; Follows one step commands with repetition  Attention Span: Attends with cues to redirect  Memory: Decreased recall of biographical Information;Decreased recall of recent events;Decreased short term memory;Decreased long term memory  Safety Judgement: Decreased awareness of need for assistance;Decreased awareness of need for safety  Problem Solving: Decreased awareness of errors  Insights: Decreased awareness of deficits  Initiation: Requires cues for some  Sequencing: Requires cues for some  Orientation  Overall Orientation Status: Impaired  Orientation Level: Oriented to person;Disoriented to place; Disoriented to time;Disoriented to situation                  Education Given To: Patient; Family  Education Provided: Role of Therapy;Plan of Care;Orientation; ADL Adaptive Strategies;Transfer Training  Education Method: Demonstration;Verbal  Barriers to Learning: Cognition; Hearing  Education Outcome: Verbalized understanding;Continued education needed                   AM-PAC Score        AM-PAC Inpatient Daily Activity Raw Score: 17 (08/18/22 1015)  AM-PAC Inpatient ADL T-Scale Score : 37.26 (08/18/22 1015)  ADL Inpatient CMS 0-100% Score: 50.11 (08/18/22 1015)  ADL Inpatient CMS G-Code Modifier : CK (08/18/22 1015)    Tinneti Score       Goals  Short Term Goals  Time Frame for Short term goals: by dc  Short Term Goal 1: Pt will complete funcitonal transfers, including toilet transfer, w/ SBA  Short Term Goal 2: Pt will participate in LE dressing assessment       Therapy Time   Individual Concurrent Group Co-treatment   Time In 0850         Time Out 0934         Minutes 44               Timed Code Treatment Minutes:  29    Total Treatment Minutes:  [address] [facility], OT

## 2022-08-18 NOTE — PLAN OF CARE
Problem: Discharge Planning  Goal: Discharge to home or other facility with appropriate resources  Outcome: Progressing  Flowsheets (Taken 8/18/2022 0144)  Discharge to home or other facility with appropriate resources:   Identify barriers to discharge with patient and caregiver   Identify discharge learning needs (meds, wound care, etc)   Refer to discharge planning if patient needs post-hospital services based on physician order or complex needs related to functional status, cognitive ability or social support system   Arrange for needed discharge resources and transportation as appropriate   Arrange for interpreters to assist at discharge as needed     Problem: Pain  Goal: Verbalizes/displays adequate comfort level or baseline comfort level  Outcome: Progressing     Problem: Safety - Adult  Goal: Free from fall injury  Outcome: Progressing     Problem: ABCDS Injury Assessment  Goal: Absence of physical injury  Outcome: Progressing  Flowsheets (Taken 8/18/2022 0142)  Absence of Physical Injury: Implement safety measures based on patient assessment     Problem: Skin/Tissue Integrity  Goal: Absence of new skin breakdown  Description: 1. Monitor for areas of redness and/or skin breakdown  2. Assess vascular access sites hourly  3. Every 4-6 hours minimum:  Change oxygen saturation probe site  4. Every 4-6 hours:  If on nasal continuous positive airway pressure, respiratory therapy assess nares and determine need for appliance change or resting period.   Outcome: Progressing

## 2022-08-18 NOTE — CONSULTS
NEUROSURGERY CONSULT NOTE    Millicent Gamez  [id number]   1935   8/18/2022    Requesting physician: Allan Villafana MD    Reason for consultation: Lethargy and confusion in setting of recent [address]    History of present illness: Patient is a 82 y.o. male that  has a past medical history of Atrial fibrillation ([facility] CHF (congestive heart failure) ([facility] [address] Hypertension, and Thyroid disease. Patient presented on 8/17/2022 to [facility] ED per the request of Dr. Paola Madrigal 2/2 four falls since returning home after recent discharge from [facility] where he was seen for [address] [facility] s/p fall. ROS:   GENERAL:  Endorses being tired all the time; Denies fever or recent illness. Denies weight changes   EYES:  Denies vision change or diplopia  EARS:  Endorses hard of hearing requiring hearing aids  CARDIAC:  Denies chest pain  RESPIRATORY:  Denies shortness of breath  SKIN:  Denies rash or lesions   HEM:  Denies excessive bruising  PSYCH:  Denies anxiety or depression  NEURO: Endorses intermittent confusion and falls; Denies headache, numbness or tingling or lateralizing weakness   :  Endorses urinary incontinence  GI: Denies nausea, vomiting, diarrhea or constipation  MUSCULOSKELETAL:  No arthralgias    Allergies   Allergen Reactions    Naproxen Rash       Past Medical History:   Diagnosis Date    Atrial fibrillation (HCC)     CHF (congestive heart failure) ([facility] [address]     Hypertension     Thyroid disease         Past Surgical History:   Procedure Laterality Date    CARPAL TUNNEL RELEASE Bilateral     CYSTOSCOPY      LUMBAR SPINE SURGERY         Social History     Occupational History    Not on file   Tobacco Use    Smoking status: Former    Smokeless tobacco: Former   Substance and Sexual Activity    Alcohol use: Not Currently    Drug use: Not on file    Sexual activity: Not on file        Family History   Family history unknown: Yes        No outpatient medications have been marked as taking for the 8/17/22 encounter [facility] Encounter). Current Facility-Administered Medications   Medication Dose Route Frequency Provider Last Rate Last Admin    carvedilol (COREG) tablet 3.125 mg  3.125 mg Oral BID  Lucy Cruz MD   3.125 mg at 08/18/22 1818    latanoprost (XALATAN) 0.005 % ophthalmic solution 1 drop  1 drop Both Eyes Daily Lucy Cruz MD   1 drop at 08/18/22 1048    levothyroxine (SYNTHROID) tablet 75 mcg  75 mcg Oral QAM AC Lucy Cruz MD   75 mcg at 08/18/22 0528    glucose chewable tablet 16 g  4 tablet Oral PRN Lucy Cruz MD        dextrose bolus 10% 125 mL  125 mL IntraVENous PRN Lucy Cruz MD        Or    dextrose bolus 10% 250 mL  250 mL IntraVENous PRN Lucy Cruz MD        glucagon (rDNA) injection 1 mg  1 mg SubCUTAneous PRN Lucy Cruz MD        dextrose 10 % infusion   IntraVENous Continuous PRN Lucy Cruz MD        sodium chloride flush 0.9 % injection 5-40 mL  5-40 mL IntraVENous 2 times per day Lucy Cruz MD   10 mL at 08/18/22 0900    sodium chloride flush 0.9 % injection 5-40 mL  5-40 mL IntraVENous PRN Lucy Cruz MD        0.9 % sodium chloride infusion   IntraVENous PRN Lucy Cruz MD   Stopped at 08/18/22 1614    ondansetron (ZOFRAN-ODT) disintegrating tablet 4 mg  4 mg Oral Q8H PRN Lucy Cruz MD        Or    ondansetron (ZOFRAN) injection 4 mg  4 mg IntraVENous Q6H PRN Lucy Cruz MD        polyethylene glycol (GLYCOLAX) packet 17 g  17 g Oral Daily PRN Lucy Cruz MD        acetaminophen (TYLENOL) tablet 650 mg  650 mg Oral Q6H PRN Lucy Cruz MD        Or    acetaminophen (TYLENOL) suppository 650 mg  650 mg Rectal Q6H PRN Lucy Cruz MD        insulin lispro (1 Unit Dial) 0-4 Units  0-4 Units SubCUTAneous TID MICHAELA Cruz MD        insulin lispro (1 Unit Dial) 0-4 Units  0-4 Units SubCUTAneous Nightly Lucy Cruz MD brimonidine (ALPHAGAN) 0.2 % ophthalmic solution 1 drop  1 drop Both Eyes BID Kianna Ellington MD   1 drop at 08/18/22 1048    And    timolol (TIMOPTIC) 0.5 % ophthalmic solution 1 drop  1 drop Both Eyes BID Kianna Ellington MD   1 drop at 08/18/22 1048    budesonide (PULMICORT) nebulizer suspension 250 mcg  0.25 mg Nebulization BID Kianna Ellington MD   250 mcg at 08/18/22 0831    And    tiotropium-olodaterol (STIOLTO) 2.5-2.5 MCG/ACT inhaler 2 puff  2 puff Inhalation Daily Nigel Goetz MD            Objective:  BP (!) 102/53   Pulse 78   Temp 97.9 °F (36.6 °C) (Oral)   Resp 16   Ht 5' 4\" (1.626 m)   Wt 123 lb 10.9 oz (56.1 kg)   SpO2 94%   BMI 21.23 kg/m²     Physical Exam:   Patient seen and examined  GCS:  4 - Opens eyes on own  5 - Alert and oriented with intermittent confusion  6 - Follows simple motor commands  General: Well developed. Alert and cooperative in no acute distress. HENT: atraumatic, neck supple; Hard of hearing requiring hearing aids  Eyes: Optic discs: Not tested  Pulmonary: unlabored respiratory effort  Cardiovascular:  Warm well perfused.  No peripheral edema  Gastrointestinal: abdomen soft, NT, ND    Neurological:  Mental Status: Awake, alert, oriented x 4, speech clear and appropriate  Attention: Intact  Language: No aphasia or dysarthria noted  Sensation: Intact to all extremities to light touch  Coordination: Intact  DTRs:    Right  Left    Arrington's Neg Neg   ankle clonus  Neg Neg   toes (babinski)  Down Down     Cranial Nerves:  II: Visual acuity not tested, denies new visual changes / diplopia  III, IV, VI: PERRL, 3 mm bilaterally, EOMI, no nystagmus noted  V: Facial sensation intact bilaterally to touch  VII: Face symmetric  VIII: Hearing intact bilaterally to spoken voice  IX: Palate movement equal bilaterally  XI: Shoulder shrug equal bilaterally  XII: Tongue midline    Musculoskeletal:   Gait: Not tested   Assist devices: None   Tone: Normal  Motor strength:    Right  Left    Right  Left    Deltoid  5 5  Hip Flex  5 5   Biceps  5 5  Knee Extensors  5 5   Triceps  5 5  Knee Flexors  4- 5   Wrist Ext  5 5  Ankle Dorsiflex. 5 5   Wrist Flex  5 5  Ankle Plantarflex. 5 5   Handgrip  5 5  Ext Diony Longus  5 5   Thumb Ext  5 5         Radiological Findings:  CT HEAD WO CONTRAST  Result Date: 8/17/2022  Persistent intraventricular hemorrhage with small amount of suspected parenchymal bleed along the lateral aspect of the posterior horn of the left lateral ventricle. The extent of the hemorrhage has decreased in comparison the prior study. CT CERVICAL SPINE WO CONTRAST  Result Date: 8/17/2022  No acute fracture identified. CT THORACIC SPINE WO CONTRAST  Result Date: 8/17/2022  Thoracic scoliosis. No acute fracture seen. CT LUMBAR SPINE WO CONTRAST  Result Date: 8/17/2022  Spondylotic changes as above. No acute fracture seen. Abdominal aortic aneurysm measuring up to 3.2 cm. MRI THORACIC SPINE W WO CONTRAST  Result Date: 8/17/2022  Unremarkable thoracic spine MRI. MRI LUMBAR SPINE W WO CONTRAST  Result Date: 8/17/2022  Multilevel discogenic/spondylotic changes with multilevel canal and foraminal narrowing as above. Abdominal aortic aneurysm. No abnormal enhancement following contrast administration.        Labs:  Recent Labs     08/18/22  0420   WBC 8.9   HGB 10.6*   HCT 32.5*          Recent Labs     08/18/22  0420      K 3.8   CL 97*   CO2 26   BUN 25*   CREATININE 1.3   GLUCOSE 116*   CALCIUM 9.0       Recent Labs     08/17/22  1937   PROTIME 15.0*   INR 1.18*       Patient Active Problem List    Diagnosis Date Noted    Intracranial hemorrhage ([address] 08/18/2022    Intracranial hemangioma ([address] 08/18/2022    Brain bleed ([address] 08/13/2022    Acute intracranial hemorrhage ([address] 08/13/2022    Pneumonia due to COVID-19 virus 09/28/2021    Type 2 diabetes mellitus without complication ([address] 67/12/3705    COPD (chronic obstructive pulmonary disease) ([facility] [address] 09/28/2021    Acquired hypothyroidism 09/28/2021    Acute respiratory failure with hypoxia ([facility] [address] 09/28/2021       Assessment:  Patient is a 82 y.o. male w/recent falls after discharge. Patient was seen on 8/13/2022 by our service for small ICH w/IVH extension. Patient also experiencing intermittent ataxia, confusion and urinary incontinence over the past month per son, but worse lately. Plan:  No emergent neurosurgical intervention indicated  Neurologic exams frequency: Q4H  For change in exam MUST contact neurosurgery team along with critical care or primary team  ICH w/IVH:  - CT Head shows hemorrhage has decreased in comparison the prior study  - Continue to hold Eliquis until 8/27/2022  Confusion:  - Intermittent confusion over the past month per son, but worse lately. Hemorrhage could be cause of worsening confusion, but would not explain prior episodes of confusion.  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 Will discuss large volume tap with Dr. Juanjose Martínez if MRI Brain without definitive source for confusion and/or ataxia  PT/OT consulted, appreciate recs  Advance diet / activity per primary team  Thank you for consult. Will follow inpatient. Please call with any questions or decline in neurological status    DISPO: Remain inpatient from neurosurgery standpoint. Dispo timing to be determined by primary team once patient is medically stable for discharge.     Patient was discussed and images reviewed with Dr. Juanjose Martínez who agrees with above assessment and plan.      Electronically signed by: VALERIO Patel - TRACY, VALERIO-CNP, 8/18/2022 8:13 PM  [phone]

## 2022-08-18 NOTE — DISCHARGE INSTRUCTIONS
Extra Heart Failure sites:   https://[employer]/ --- this is American Heart Association interactive Healthier Living with Heart Failure guidebook. Please copy and paste link into search bar. Use your mouse to scroll through the pages. Lots and lots of info / tips    [address] --- free smart phone keesha- (icon will look like a lopsided pink heart) --Use your phone to track sodium / fluid intake,2 symptoms, weight, etc.    [employer] - website-- [employer] is a dialysis [employer]. All dialysis patients follow a renal diet which IS low sodium!! This website offers free seasonal cookbooks.   Each quarter, they will release 25-30 new recipes with a breakdown of calories, sodium, glucose, etc    [email or web address]/recipes -- more free recipes

## 2022-08-18 NOTE — PLAN OF CARE
Problem: Pain  Goal: Verbalizes/displays adequate comfort level or baseline comfort level  8/18/2022 1012 by Pauly Acevedo RN  Outcome: Progressing  Flowsheets (Taken 8/18/2022 1012)  Verbalizes/displays adequate comfort level or baseline comfort level:   Encourage patient to monitor pain and request assistance   Administer analgesics based on type and severity of pain and evaluate response     Problem: Safety - Adult  Goal: Free from fall injury  8/18/2022 1012 by Pauly Acevedo RN  Outcome: Progressing  Flowsheets  Taken 8/18/2022 1012  Free From Fall Injury: Instruct family/caregiver on patient safety  Taken 8/18/2022 0900  Free From Fall Injury: Instruct family/caregiver on patient safety     Problem: Chronic Conditions and Co-morbidities  Goal: Patient's chronic conditions and co-morbidity symptoms are monitored and maintained or improved  Recent Flowsheet Documentation  Taken 8/18/2022 0930 by Candelaria Neil 34 - Patient's Chronic Conditions and Co-Morbidity Symptoms are Monitored and Maintained or Improved: Monitor and assess patient's chronic conditions and comorbid symptoms for stability, deterioration, or improvement

## 2022-08-18 NOTE — H&P
reviewed and are negative. PHYSICAL EXAM:  /63   Pulse 77   Temp 97.6 °F (36.4 °C) (Oral)   Resp 18   Ht 5' 4\" (1.626 m)   Wt 123 lb 10.9 oz (56.1 kg)   SpO2 94%   BMI 21.23 kg/m²   Recent Labs     08/18/22  1226   POCGLU 144*     General appearance: appears stated age, cooperative, and drowsy  Head: Normocephalic, without obvious abnormality, atraumatic  Eyes: conjunctivae/corneas clear. PERRL, EOM's intact. Fundi benign. Neck: no adenopathy, no carotid bruit, no JVD, supple, symmetrical, trachea midline, and thyroid not enlarged, symmetric, no tenderness/mass/nodules  Lungs: clear to auscultation bilaterally  Heart: regular rate and rhythm, S1, S2 normal, no murmur, click, rub or gallop  Abdomen: soft, non-tender; bowel sounds normal; no masses,  no organomegaly  Extremities: extremities normal, atraumatic, no cyanosis or edema  Pulses: 2+ and symmetric  Skin: Skin color, texture, turgor normal. No rashes or lesions  Lymph nodes: Cervical, supraclavicular, and axillary nodes normal.  Neurologic: Grossly normal    LABS:  Recent Labs     08/17/22  1526 08/18/22  0420   WBC 8.2 8.9   HGB 10.7* 10.6*   HCT 32.2* 32.5*    276                                                                  Recent Labs     08/17/22  1526 08/18/22  0420   * 136   K 4.3 3.8   CL 93* 97*   CO2 30 26   BUN 28* 25*   CREATININE 1.4* 1.3   GLUCOSE 160* 116*     Recent Labs     08/17/22  1937   INR 1.18*     Recent Labs     08/17/22  1819   COLORU Yellow   PHUR 6.0   WBCUA 0-2   RBCUA None seen   CLARITYU Clear   SPECGRAV 1.010   LEUKOCYTESUR Negative   UROBILINOGEN 0.2   BILIRUBINUR Negative   BLOODU Negative   GLUCOSEU Negative      EKG Independently reviewed: normal EKG, normal sinus rhythm, unchanged from previous tracings.   PCXR Independently reviewed: mild left basilar atelectasis      Head CT:       Persistent intraventricular hemorrhage with small amount of suspected parenchymal bleed along the lateral aspect of the posterior horn of the left lateral ventricle. The extent of the hemorrhage has decreased in comparison the prior study but interval    rebleed is a potential consideration. CT Lumbar spine:   Spondylotic changes as above. No acute fracture seen. Abdominal aortic aneurysm measuring up to 3.2 cm. CT C spine: There is no evidence of acute fracture or traumatic bony abnormality identified in the cervical spine. Moderately advanced multilevel degenerative spondylotic changes are seen with disc height narrowing and bony spurring seen at multiple levels. Uncovertebral spurring is also noted most prominently on the left. Slight anterior offset at the C7-T1 level is likely related to degenerative change. If concern of discogenic disease, MRI correlation would be recommended. There appears to be contribution of a broad-based disc bulge at the C4-5 and C5-6 levels. CT T spine: There is normal alignment of the thoracic vertebra with no evidence of acute fracture or traumatic bony abnormality seen. No significant bony spondylotic change is identified in the thoracic vertebra. No significant bony canal or foraminal narrowing is seen. If concern of associated soft disc pathology, correlation with MRI should be considered. There is thoracic scoliosis noted. Assessment & Plan:    Patient Active Problem List:    ICH: questionable change on CT, neurosurgery has been consulted, await further eval/ recs. Continue off all AC at this time. HX  afib: off AC for now as above, currently appears to be in SR on tele. Will try to review prior records for more information, continue tele monitoring, will ask for cardiology input re: rate control per family concerns. Weakness/ falls: not clear if this is due to afib/ HR issues, deconditioning, HX CVA or other possible contributing factors.  PT/OT evals for assistance with current needs/ dc planning  Htn: BP stable at this time, continue to monitor. Low BP earlier per RN but quickly improved. DM: FSBS, cont SSI for now until further needs assessed. Hypothyroid: last TSH in November 1.96, will repeat TSH, free T4. The patient and / or the family were informed of the results of any tests, a time was given to answer questions, a plan was proposed and they agreed with plan. Thank you Dr. Misael Fields MD for the opportunity to be involved in this patients care. If you have any questions or concerns please feel free to contact me at [phone].   Full Code

## 2022-08-18 NOTE — PROGRESS NOTES
Patient admitted to room 4304 per ER. Transferred to bed with max assist. Oriented to room, call light, phone, TV, thermostat, bed controls, bathroom, emergency cord, white board, therapy times, unit routine, visiting hours,  and meal times. Patient verbalized understanding. Call light and personal items in reach, alarms active and in place. 4 Eyes Admission Assessment     I agree as the admission nurse that 2 RN's have performed a thorough Head to Toe Skin Assessment on the patient. ALL assessment sites listed below have been assessed on admission. Areas assessed by both nurses: Admission  [x]   Head, Face, and Ears   [x]   Shoulders, Back, and Chest  [x]   Arms, Elbows, and Hands   [x]   Coccyx, Sacrum, and Ischum  [x]   Legs, Feet, and Heels        Does the Patient have Skin Breakdown?   No         Guido Prevention initiated:  No   Wound Care Orders initiated:  No      [facility] nurse consulted for Pressure Injury (Stage 3,4, Unstageable, DTI, NWPT, and Complex wounds):  No      Nurse 1 eSignature: Electronically signed by Nicol Doll RN on 8/18/22 at 5:32 AM EDT    **SHARE this note so that the co-signing nurse is able to place an eSignature**    Nurse 2 eSignature: Electronically signed by Adams Giron RN on 8/18/22 at 6:06 AM EDT

## 2022-08-18 NOTE — PROGRESS NOTES
Physical Therapy  Facility/Department: [place] [address] [facility]  Physical Therapy Initial Assessment and Treatment    Name: Jenny Allen  : 1935  MRN: [id number]  Date of Service: 2022    Discharge Recommendations:  Jenny Allen scored a 15/24 on the AM-PAC short mobility form. Current research shows that an AM-PAC score of 17 or less is typically not associated with a discharge to the patient's home setting. Based on the patient's AM-PAC score and their current functional mobility deficits, it is recommended that the patient have 5-7 sessions per week of Physical Therapy at d/c to increase the patient's independence. At this time, this patient demonstrates complex nursing, medical, and rehabilitative needs, and would benefit from intensive rehabilitation services upon discharge from the Inpatient setting. This patient demonstrates the ability to participate in and benefit from an intensive therapy program with a coordinated interdisciplinary team approach to foster frequent, structured, and documented communication among disciplines, who will work together to establish, prioritize, and achieve treatment goals. Please see assessment section for further patient specific details. PT Equipment Recommendations  Equipment Needed: No        Assessment   Assessment: Pt re-admitted from home with 4 falls since being d/c'd home 8/15 with ICH. Pt groggy with noted confusion. Pt needing increased assist for transfers and demonstrates ataxic gait with use of rolling walker. Pt's friend at home is unable to provide adequate assistance. Pt is below reported baseline and would benefit from continued IP PT to increase strength, balance and gait. Will continue to follow for ongoing PT. Treatment Diagnosis: Decreased gait associated with ICH.   Decision Making: Medium Complexity  Requires PT Follow-Up: Yes     Plan   Plan: 5-7 times per week  Current Treatment Recommendations: Transfer training, Functional mobility training, Strengthening, Gait training    Safety Devices  Type of Devices: Left in chair, Chair alarm in place, Call light within reach, Nurse notified (son in room)     Restrictions  Up with assist     Subjective   Chart Reviewed: Yes  Additional Pertinent Hx: Pt to ED 8/17 with ICH and falls. Pt discharged home from [place] [address] 8/15 with [address] and continued decline at home, falling 4 times. Head Imgaing: shows hemorrhage has decreased. Spine Imaging: Multilevel discogenic/spondylotic changes. Neurosurgery consult. PMH:  Afib, CHF, HTN, Covid-19, falls, ICH  Diagnosis: ICH    Subjective  Subjective: Pt found supine in bed. Son arrived at end of session. Pt groggy and tired with noted confusion. Pt agreeable for PT evaluation. Social/Functional History  Lives With: Friend(s) (Son's friend, Mac Mcpherson, who is 52 and medically released from the [employer]. Unable to assist pt at home.)  Type of Home: House  Home Layout: Two level, Able to Live on Main level with bedroom/bathroom  Home Access: Stairs to enter without rails (1 step)  Bathroom Shower/Tub: Walk-in shower, Shower chair with back  Bathroom Toilet: Standard  Bathroom Equipment: Grab bars in shower, Shower chair, Grab bars around toilet  Home Equipment: Rollator, Cane, Reacher, Oxygen (2L O2)  Has the patient had two or more falls in the past year or any fall with injury in the past year?: Yes (4 falls since being discharged from [place] [address] on 8/15.)  ADL Assistance: Needs assistance (has a lady come 2x a week for showers)  Homemaking Assistance: Needs assistance (Friend does homemaking)  Ambulation Assistance: Independent (using rollator)  Transfer Assistance: Independent  Active : No  Occupation: Retired ([address] work)  Leisure & Hobbies: go shooting  Additional Comments: Son reports friend is not capable of assisting patient at home.     Vision/Hearing  Vision  Vision: Within Functional Limits  Hearing: Exceptions to [facility]: Bilateral hearing aid      Cognition Overall Orientation Status: Impaired  Orientation Level: Oriented to person;Disoriented to place; Disoriented to time;Disoriented to situation     Objective   Gross Assessment  AROM: Within functional limits  Strength: Generally decreased, functional     Bed mobility  Supine to Sit: Minimal assistance (HOB raised, max verbal cues, increased time)    Transfers  Sit to Stand: Minimal Assistance (from EOB to walker, cues for hand placement and safety techniques)  Stand to sit: Minimal Assistance (cues for safety, decreased control to sit)  Bed to Chair: Minimal assistance (stand step pivot with rolling walker)    Ambulation  Device: Rolling Walker  Assistance: Moderate assistance  Quality of Gait: Ataxic gait, assist for walker management when turning  Gait Deviations: Slow Mandy;Decreased step length;Decreased step height  Distance: 20ft  Comments: SpO2 decreased to 86% on room air with activity. Pt placed back on 2L. SpO2 increased back to 93%. Balance  Sitting - Static: Good  Sitting - Dynamic: Fair  Standing - Static: Poor (slight posterior lean at walker)  Standing - Dynamic: Poor (Mod A with walker for ambulation)      AM-PAC Score  AM-PAC Inpatient Mobility Raw Score : 15 (08/18/22 0949)  AM-PAC Inpatient T-Scale Score : 39.45 (08/18/22 0949)  Mobility Inpatient CMS 0-100% Score: 57.7 (08/18/22 0949)  Mobility Inpatient CMS G-Code Modifier : CK (08/18/22 0949)      Goals  Short Term Goals  Time Frame for Short term goals: Discharge  Short term goal 1: supine <> sit SBA  Short term goal 2: sit <> stand SBA  Short term goal 3: ambulate 150ft with rolling walker SBA  Patient Goals   Patient goals : Not stated       Education  Patient Education  Education Given To: Patient; Family  Education Provided: Role of Therapy  Education Method: Verbal  Education Outcome: Demonstrated understanding      Therapy Time   Individual Concurrent Group Co-treatment   Time In 3843         Time Out 0930         Minutes 36 Timed Code Treatment Minutes:  25  Total Treatment Minutes:  40      Billy Castro, PT

## 2022-08-19 ENCOUNTER — APPOINTMENT (OUTPATIENT)
Dept: GENERAL RADIOLOGY | Age: 87
DRG: 310 | End: 2022-08-19
Payer: MEDICARE

## 2022-08-19 PROBLEM — I48.0 PAROXYSMAL ATRIAL FIBRILLATION (HCC): Status: ACTIVE | Noted: 2022-08-19

## 2022-08-19 PROBLEM — R00.1 BRADYCARDIA: Status: ACTIVE | Noted: 2022-08-19

## 2022-08-19 LAB
APPEARANCE CSF: ABNORMAL
CHOLESTEROL, TOTAL: 155 MG/DL (ref 0–199)
CLOT EVALUATION CSF: ABNORMAL
COLOR CSF: COLORLESS
ESTIMATED AVERAGE GLUCOSE: 180 MG/DL
GLUCOSE BLD-MCNC: 132 MG/DL (ref 70–99)
GLUCOSE BLD-MCNC: 139 MG/DL (ref 70–99)
GLUCOSE BLD-MCNC: 168 MG/DL (ref 70–99)
GLUCOSE BLD-MCNC: 250 MG/DL (ref 70–99)
GLUCOSE, CSF: 82 MG/DL (ref 40–80)
HBA1C MFR BLD: 7.9 %
HDLC SERPL-MCNC: 58 MG/DL (ref 40–60)
INR BLD: 1.11 (ref 0.87–1.14)
LDL CHOLESTEROL CALCULATED: 85 MG/DL
NO DIFFERENTIAL CSF: ABNORMAL
PERFORMED ON: ABNORMAL
PROTEIN CSF: 53 MG/DL (ref 15–45)
PROTHROMBIN TIME: 14.3 SEC (ref 11.7–14.5)
RBC CSF: 1070 /CUMM
TRIGL SERPL-MCNC: 59 MG/DL (ref 0–150)
TSH REFLEX: 1.27 UIU/ML (ref 0.27–4.2)
TUBE NUMBER CSF: ABNORMAL
VLDLC SERPL CALC-MCNC: 12 MG/DL
WBC CSF: 1 /CUMM (ref 0–5)

## 2022-08-19 PROCEDURE — 6360000002 HC RX W HCPCS: Performed by: INTERNAL MEDICINE

## 2022-08-19 PROCEDURE — 87205 SMEAR GRAM STAIN: CPT

## 2022-08-19 PROCEDURE — 99231 SBSQ HOSP IP/OBS SF/LOW 25: CPT | Performed by: NURSE PRACTITIONER

## 2022-08-19 PROCEDURE — 94640 AIRWAY INHALATION TREATMENT: CPT

## 2022-08-19 PROCEDURE — 85610 PROTHROMBIN TIME: CPT

## 2022-08-19 PROCEDURE — 87483 CNS DNA AMP PROBE TYPE 12-25: CPT

## 2022-08-19 PROCEDURE — 2700000000 HC OXYGEN THERAPY PER DAY

## 2022-08-19 PROCEDURE — 87070 CULTURE OTHR SPECIMN AEROBIC: CPT

## 2022-08-19 PROCEDURE — 88112 CYTOPATH CELL ENHANCE TECH: CPT

## 2022-08-19 PROCEDURE — 84157 ASSAY OF PROTEIN OTHER: CPT

## 2022-08-19 PROCEDURE — 6370000000 HC RX 637 (ALT 250 FOR IP): Performed by: INTERNAL MEDICINE

## 2022-08-19 PROCEDURE — 94761 N-INVAS EAR/PLS OXIMETRY MLT: CPT

## 2022-08-19 PROCEDURE — 97530 THERAPEUTIC ACTIVITIES: CPT

## 2022-08-19 PROCEDURE — 62328 DX LMBR SPI PNXR W/FLUOR/CT: CPT

## 2022-08-19 PROCEDURE — 2580000003 HC RX 258: Performed by: INTERNAL MEDICINE

## 2022-08-19 PROCEDURE — 84443 ASSAY THYROID STIM HORMONE: CPT

## 2022-08-19 PROCEDURE — 82945 GLUCOSE OTHER FLUID: CPT

## 2022-08-19 PROCEDURE — 97535 SELF CARE MNGMENT TRAINING: CPT

## 2022-08-19 PROCEDURE — 1200000000 HC SEMI PRIVATE

## 2022-08-19 PROCEDURE — 89050 BODY FLUID CELL COUNT: CPT

## 2022-08-19 PROCEDURE — 97116 GAIT TRAINING THERAPY: CPT

## 2022-08-19 PROCEDURE — 009U3ZX DRAINAGE OF SPINAL CANAL, PERCUTANEOUS APPROACH, DIAGNOSTIC: ICD-10-PCS | Performed by: RADIOLOGY

## 2022-08-19 PROCEDURE — 36415 COLL VENOUS BLD VENIPUNCTURE: CPT

## 2022-08-19 PROCEDURE — 99223 1ST HOSP IP/OBS HIGH 75: CPT | Performed by: INTERNAL MEDICINE

## 2022-08-19 RX ADMIN — BRIMONIDINE TARTRATE 1 DROP: 2 SOLUTION OPHTHALMIC at 10:02

## 2022-08-19 RX ADMIN — LEVOTHYROXINE SODIUM 75 MCG: 0.07 TABLET ORAL at 05:45

## 2022-08-19 RX ADMIN — BRIMONIDINE TARTRATE 1 DROP: 2 SOLUTION OPHTHALMIC at 20:43

## 2022-08-19 RX ADMIN — CARVEDILOL 3.12 MG: 3.12 TABLET, FILM COATED ORAL at 18:42

## 2022-08-19 RX ADMIN — CARVEDILOL 3.12 MG: 3.12 TABLET, FILM COATED ORAL at 10:02

## 2022-08-19 RX ADMIN — TIMOLOL MALEATE 1 DROP: 5 SOLUTION OPHTHALMIC at 10:03

## 2022-08-19 RX ADMIN — LATANOPROST 1 DROP: 50 SOLUTION OPHTHALMIC at 10:03

## 2022-08-19 RX ADMIN — TIMOLOL MALEATE 1 DROP: 5 SOLUTION OPHTHALMIC at 20:43

## 2022-08-19 RX ADMIN — BUDESONIDE 250 MCG: 0.25 SUSPENSION RESPIRATORY (INHALATION) at 10:57

## 2022-08-19 RX ADMIN — SODIUM CHLORIDE, PRESERVATIVE FREE 10 ML: 5 INJECTION INTRAVENOUS at 10:05

## 2022-08-19 RX ADMIN — BUDESONIDE 250 MCG: 0.25 SUSPENSION RESPIRATORY (INHALATION) at 19:59

## 2022-08-19 ASSESSMENT — PAIN SCALES - GENERAL
PAINLEVEL_OUTOF10: 0
PAINLEVEL_OUTOF10: 0

## 2022-08-19 NOTE — PROGRESS NOTES
Hospitalist Progress Note      PCP: Ayaz Roy MD    Date of Admission: 8/17/2022    Chief Complaint: Weakness and fall    Hospital Course: This is a very pleasant 82 y.o. male with a history of multiple medical problems presenting with weakness and falls. Pt was discharged from [facility] Monday after an admit for ICH s/p fall at home. Was admitted, seen by neurosurgery, no surgical intervention needed. Repeat head CT stable, eliquis stopped. Pt dc'd home with f/u with neurosurgery. Per family pt has fallen several times since dc Monday, has been sleeping most of the time since he left the hospital. Was seen in office for f/u yesterday by Dr. Cyrus Jordan, who recommended he come to the ER for further assessment. Repeat head CT as noted below, questionable change, not clear at this time. Orthostatic negative. MRI brain performed with no evidence of acute events. Cervical MRI with advanced cervical spondylosis with associated disc herniations resulting in varying degrees of central canal and foraminal stenosis. Subjective:   Patient is more lucid this morning and able to answer questions appropriately. No complaints of pain. Seen by neurosurgery with the plan for LP to assess NPH with the previously reported confusion and ataxia.     Medications:  Reviewed    Infusion Medications    dextrose      sodium chloride Stopped (08/18/22 1614)     Scheduled Medications    carvedilol  3.125 mg Oral BID WC    latanoprost  1 drop Both Eyes Daily    levothyroxine  75 mcg Oral QAM AC    sodium chloride flush  5-40 mL IntraVENous 2 times per day    insulin lispro  0-4 Units SubCUTAneous TID WC    insulin lispro  0-4 Units SubCUTAneous Nightly    brimonidine  1 drop Both Eyes BID    And    timolol  1 drop Both Eyes BID    budesonide  0.25 mg Nebulization BID    And    tiotropium-olodaterol  2 puff Inhalation Daily     PRN Meds: perflutren lipid microspheres, glucose, dextrose bolus **OR** dextrose bolus, glucagon (rDNA), dextrose, sodium chloride flush, sodium chloride, ondansetron **OR** ondansetron, polyethylene glycol, acetaminophen **OR** acetaminophen      Intake/Output Summary (Last 24 hours) at 8/19/2022 1739  Last data filed at 8/19/2022 1728  Gross per 24 hour   Intake 480 ml   Output 750 ml   Net -270 ml       Physical Exam Performed:    BP (!) 169/83   Pulse 65   Temp 97.5 °F (36.4 °C) (Oral)   Resp 18   Ht 5' 4\" (1.626 m)   Wt 123 lb 7.3 oz (56 kg)   SpO2 96%   BMI 21.19 kg/m²     General appearance: No apparent distress, appears stated age and cooperative. HEENT: Pupils equal, round, and reactive to light. Conjunctivae/corneas clear. Neck: Supple, with full range of motion. No jugular venous distention. Trachea midline. Respiratory:  Normal respiratory effort. Clear to auscultation, bilaterally without Rales/Wheezes/Rhonchi. Cardiovascular: Regular rate and rhythm with normal S1/S2 without murmurs, rubs or gallops. Abdomen: Soft, non-tender, non-distended with normal bowel sounds. Musculoskeletal: No clubbing, cyanosis or edema bilaterally. Full range of motion without deformity. Skin: Skin color, texture, turgor normal.  No rashes or lesions. Neurologic: Nonfocal  Psychiatric: Awake and alert thought content appropriate, normal insight      Labs:   Recent Labs     08/17/22  1526 08/18/22  0420   WBC 8.2 8.9   HGB 10.7* 10.6*   HCT 32.2* 32.5*    276     Recent Labs     08/17/22  1526 08/18/22  0420   * 136   K 4.3 3.8   CL 93* 97*   CO2 30 26   BUN 28* 25*   CREATININE 1.4* 1.3   CALCIUM 9.3 9.0     No results for input(s): AST, ALT, BILIDIR, BILITOT, ALKPHOS in the last 72 hours. Recent Labs     08/17/22  1937/22  0917   INR 1.18* 1.11     No results for input(s): Yas Mickey in the last 72 hours.     Urinalysis:      Lab Results   Component Value Date/Time    NITRU Negative 08/17/2022 06:19 PM    WBCUA 0-2 08/17/2022 06:19 PM    RBCUA None seen 08/17/2022 06:19 PM    BLOODU Negative 08/17/2022 06:19 PM    SPECGRAV 1.010 08/17/2022 06:19 PM    GLUCOSEU Negative 08/17/2022 06:19 PM       Radiology:  FL LUMBAR PUNCTURE DIAG   Final Result   1. Uneventful diagnostic fluoroscopic guided lumbar puncture   2. Opening pressure of 16 cm water with xanthochromic CSF fluid noted      MRI CERVICAL SPINE WO CONTRAST   Final Result   1. Advanced cervical spondylosis with associated disc herniations resulting in varying degrees of central canal and foraminal stenosis as detailed above. MRI BRAIN WO CONTRAST   Final Result   1. Small amount of hemorrhage layering in the occipital horns of the bilateral lateral ventricles. FLAIR hyperintensity also noted in the bilateral subarachnoid space, compatible with some subarachnoid hemorrhage, within the mid and posterior/dependent    brain, involving bilateral frontal, parietal, occipital and temporal regions. 2. Susceptibility artifact along the lateral aspect of the left lateral ventricle body, most compatible with subependymal parenchymal hemorrhage, similar to prior CT. 3. Diffusion restriction compatible with recent small ischemic infarct along the medial right frontal cortex. 4. Moderate to severe diffuse cerebral atrophy with superimposed moderate to severe small vessel ischemic disease. MRI THORACIC SPINE W WO CONTRAST   Final Result      Unremarkable thoracic spine MRI. MRI LUMBAR SPINE W WO CONTRAST   Final Result      Multilevel discogenic/spondylotic changes with multilevel canal and foraminal narrowing as above. Abdominal aortic aneurysm. No abnormal enhancement following contrast administration. CT CERVICAL SPINE WO CONTRAST   Final Result      No acute fracture identified. CT LUMBAR SPINE WO CONTRAST   Final Result      Spondylotic changes as above. No acute fracture seen. Abdominal aortic aneurysm measuring up to 3.2 cm.       CT THORACIC SPINE WO CONTRAST Final Result      Thoracic scoliosis. No acute fracture seen. XR CHEST PORTABLE   Final Result      Mild left basilar atelectasis or infiltrate. CT HEAD WO CONTRAST   Final Result      Persistent intraventricular hemorrhage with small amount of suspected parenchymal bleed along the lateral aspect of the posterior horn of the left lateral ventricle. The extent of the hemorrhage has decreased in comparison the prior study but interval    rebleed is a potential consideration. Results called to Dr. Molina Montgomery at 7:15 PM on 8/17/2022. Assessment/Plan:    Active Hospital Problems    Diagnosis     Bradycardia [R00.1]      Priority: Medium    Paroxysmal atrial fibrillation (HCC) [I48.0]      Priority: Medium    Intracranial hemorrhage (HCC) [I62.9]      Priority: Medium    Intracranial hemangioma (HCC) [D18.02]      Priority: Medium    COPD (chronic obstructive pulmonary disease) ([facility] [J44.9]     Acquired hypothyroidism [E03.9]      Plan  -LP per neurosurgery  -Echocardiogram pending per cardiology  -Continue breathing treatment for COPD. Patient on 2 L chronically.  -Continue levothyroxine. TSH 1.27 on 8/19/2022. DVT Prophylaxis: None given intracranial hemorrhage  Diet: ADULT DIET; Regular; 3 carb choices (45 gm/meal); Low Fat/Low Chol/High Fiber/2 gm Na  Code Status: Full Code    PT/OT Eval Status:  Following    Dispo -Home in 1 to 2 days    Galen Aranda MD

## 2022-08-19 NOTE — FLOWSHEET NOTE
08/19/22 1034   Vitals   Orthostatic B/P and Pulse?  Yes   Blood Pressure Lying 131/54   Pulse Lying 67 PER MINUTE   Blood Pressure Sitting 147/61   Pulse Sitting 74 PER MINUTE   Blood Pressure Standing 121/56   Pulse Standing 81 PER MINUTE

## 2022-08-19 NOTE — PROGRESS NOTES
NEUROSURGERY PROGRESS NOTE    8/19/2022 9:40 AM                               Brendan Cunningham                      LOS: 1 day       Subjective: Patient sitting up in bed upon entering the room. No acute events overnight. Patient has no specific complaints this morning. Physical Exam:  Patient seen and examined    Vitals:    08/19/22 0457   BP: (!) 161/78   Pulse: 82   Resp: 18   Temp: 97.6 °F (36.4 °C)   SpO2:      GCS:  4 - Opens eyes on own  5 - Alert and oriented with intermittent confusion  6 - Follows simple motor commands  General: Well developed. Alert and cooperative in no acute distress. HENT: atraumatic, neck supple; Hard of hearing requiring hearing aids  Eyes: Optic discs: Not tested  Pulmonary: unlabored respiratory effort  Cardiovascular:  Warm well perfused. No peripheral edema  Gastrointestinal: abdomen soft, NT, ND     Neurological:  Mental Status: Awake, alert, oriented x 4, speech clear and appropriate  Attention: Intact  Language: No aphasia or dysarthria noted  Sensation: Intact to all extremities to light touch  Coordination: Intact  DTRs:    Right Left   Arrington's Neg Neg   ankle clonus  Neg Neg   toes (babinski)  Down Down      Cranial Nerves:  II: Visual acuity not tested, denies new visual changes / diplopia  III, IV, VI: PERRL, 3 mm bilaterally, EOMI, no nystagmus noted  V: Facial sensation intact bilaterally to touch  VII: Face symmetric  VIII: Hearing intact bilaterally to spoken voice  IX: Palate movement equal bilaterally  XI: Shoulder shrug equal bilaterally  XII: Tongue midline     Musculoskeletal:  Gait: Not tested  Assist devices: None  Tone: Normal  Motor strength:    Right Left     Right Left   Deltoid  5 5   Hip Flex  5 5   Biceps  5 5   Knee Extensors  5 5   Triceps  5 5   Knee Flexors  4- 5   Wrist Ext  5 5   Ankle Dorsiflex. 5 5   Wrist Flex  5 5   Ankle Plantarflex.   5 5   Handgrip  5 5   Ext Diony Longus  5 5   Thumb Ext  5 5              Radiological Findings:  CT HEAD WO CONTRAST  Result Date: 8/17/2022  Persistent intraventricular hemorrhage with small amount of suspected parenchymal bleed along the lateral aspect of the posterior horn of the left lateral ventricle. The extent of the hemorrhage has decreased in comparison the prior study. CT CERVICAL SPINE WO CONTRAST  Result Date: 8/17/2022  No acute fracture identified. CT THORACIC SPINE WO CONTRAST  Result Date: 8/17/2022  Thoracic scoliosis. No acute fracture seen. CT LUMBAR SPINE WO CONTRAST  Result Date: 8/17/2022  Spondylotic changes as above. No acute fracture seen. Abdominal aortic aneurysm measuring up to 3.2 cm. MRI THORACIC SPINE W WO CONTRAST  Result Date: 8/17/2022  Unremarkable thoracic spine MRI. MRI LUMBAR SPINE W WO CONTRAST  Result Date: 8/17/2022  Multilevel discogenic/spondylotic changes with multilevel canal and foraminal narrowing as above. Abdominal aortic aneurysm. No abnormal enhancement following contrast administration. MRI CERVICAL SPINE WO CONTRAST  Result Date: 8/18/2022  Advanced cervical spondylosis with associated disc herniations resulting in varying degrees of central canal and foraminal stenosis as detailed above. MRI BRAIN WO CONTRAST  Result Date: 8/18/2022  1. Small amount of hemorrhage layering in the occipital horns of the bilateral lateral ventricles. FLAIR hyperintensity also noted in the bilateral subarachnoid space, compatible with some subarachnoid hemorrhage, within the mid and posterior/dependent brain, involving bilateral frontal, parietal, occipital and temporal regions. 2. Susceptibility artifact along the lateral aspect of the left lateral ventricle body, most compatible with subependymal parenchymal hemorrhage, similar to prior CT. 3. Diffusion restriction compatible with recent small ischemic infarct along the medial right frontal cortex.   4. Moderate to severe diffuse cerebral atrophy with superimposed moderate to severe small vessel ischemic disease. Labs:  Recent Labs     08/18/22 0420   WBC 8.9   HGB 10.6*   HCT 32.5*          Recent Labs     08/18/22 0420      K 3.8   CL 97*   CO2 26   BUN 25*   CREATININE 1.3   GLUCOSE 116*   CALCIUM 9.0       Recent Labs     08/1935   PROTIME 15.0*   INR 1.18*       Patient Active Problem List    Diagnosis Date Noted    Intracranial hemorrhage ([address] 08/18/2022    Intracranial hemangioma ([address] 08/18/2022    Brain bleed ([address] 08/13/2022    Acute intracranial hemorrhage ([address] 08/13/2022    Pneumonia due to COVID-19 virus 09/28/2021    Type 2 diabetes mellitus without complication ([address] 65/18/9133    COPD (chronic obstructive pulmonary disease) ([address] 09/28/2021    Acquired hypothyroidism 09/28/2021    Acute respiratory failure with hypoxia ([address] 09/28/2021     Assessment:  Patient is a 82 y.o. male w/recent falls after discharge. Patient was seen on 8/13/2022 by our service for small ICH w/IVH extension. Patient also experiencing intermittent ataxia, confusion and urinary incontinence over the past month per son, but worse lately. Plan:  Neurologic exams frequency: Q4H  For change in exam MUST contact neurosurgery team along with critical care or primary team  ICH w/IVH:  - CT Head shows hemorrhage has decreased in comparison the prior study  - MRI Brain shows previously seen parenchymal hemorrhage and intraventricular hemorrhage, but also shows FLAIR hyperintensity also noted in the bilateral subarachnoid space, compatible with some subarachnoid hemorrhage  - Continue to hold Eliquis until 8/27/2022  Confusion:  - Intermittent confusion over the past month per son, but worse lately.  Hemorrhage could be cause of worsening confusion, but would not explain prior episodes of confusion.  - UA Negative  - MRI Brain shows moderate to severe diffuse cerebral atrophy with superimposed moderate to severe small vessel ischemic disease, but no definitive source for confusion  Ataxia:  - CT of spine shows no acute fractures just Spondylotic changes and DDD  - MRI Thoracic/Lumbar revealed multilevel discogenic/spondylotic changes with multilevel canal and foraminal narrowing, worst at L3/4  - MRI Cervical shows advanced cervical spondylosis with associated disc herniations resulting in varying degrees of central canal and foraminal stenosis   Urinary Incontinence:  - Intermittent incontinence over the past month per son, but worse lately  - UA Negative  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 Will send CSF labs  - PT/OT following, appreciate recs  Advance diet / activity per primary team  Will follow inpatient. Please call with any questions or decline in neurological status     DISPO: Remain inpatient from neurosurgery standpoint. Dispo timing to be determined by primary team once patient is medically stable for discharge. Patient was discussed and images reviewed with Dr. Sebas Moses who agrees with above assessment and plan. Electronically signed by: VALERIO Campbell CNP, APRN-CNP, 8/19/2022 9:40 AM  [phone]    I spent 30 minutes in the care of this patient.   Over 50% of that time was in face-to-face counseling regarding disease process, diagnostic testing, preventative measures, and answering patient and family questions

## 2022-08-19 NOTE — PLAN OF CARE
Problem: Discharge Planning  Goal: Discharge to home or other facility with appropriate resources  Outcome: Progressing  Flowsheets (Taken 8/18/2022 0930 by Dominick Reddy RN)  Discharge to home or other facility with appropriate resources: Identify barriers to discharge with patient and caregiver     Problem: Pain  Goal: Verbalizes/displays adequate comfort level or baseline comfort level  8/18/2022 2200 by Abel Lira RN  Outcome: Progressing  8/18/2022 1012 by Dominick Reddy RN  Outcome: Progressing  Flowsheets (Taken 8/18/2022 1012)  Verbalizes/displays adequate comfort level or baseline comfort level:   Encourage patient to monitor pain and request assistance   Administer analgesics based on type and severity of pain and evaluate response     Problem: Safety - Adult  Goal: Free from fall injury  8/18/2022 2200 by Abel Lira RN  Outcome: Progressing  8/18/2022 1012 by Dominick Reddy RN  Outcome: Progressing  Flowsheets  Taken 8/18/2022 1012  Free From Fall Injury: Instruct family/caregiver on patient safety  Taken 8/18/2022 0900  Free From Fall Injury: Instruct family/caregiver on patient safety     Problem: ABCDS Injury Assessment  Goal: Absence of physical injury  Outcome: Progressing  Flowsheets (Taken 8/18/2022 0900 by Dominick Reddy RN)  Absence of Physical Injury: Implement safety measures based on patient assessment     Problem: Skin/Tissue Integrity  Goal: Absence of new skin breakdown  Description: 1. Monitor for areas of redness and/or skin breakdown  2. Assess vascular access sites hourly  3. Every 4-6 hours minimum:  Change oxygen saturation probe site  4. Every 4-6 hours:  If on nasal continuous positive airway pressure, respiratory therapy assess nares and determine need for appliance change or resting period.   Outcome: Progressing     Problem: Chronic Conditions and Co-morbidities  Goal: Patient's chronic conditions and co-morbidity symptoms are monitored and maintained or improved  Outcome: Progressing  Flowsheets (Taken 8/18/2022 7930 by Michelle Orosco RN)  Care Plan - Patient's Chronic Conditions and Co-Morbidity Symptoms are Monitored and Maintained or Improved: Monitor and assess patient's chronic conditions and comorbid symptoms for stability, deterioration, or improvement

## 2022-08-19 NOTE — CONSULTS
[address]         Reason for Consultation/Chief Complaint: frequent falls and hypersomnolence with slow HR       History of Present Illness:  Mayte Zimmerman is a 82 y.o.  is a very pleasant 82 y.o. male with complaints of frequent falls and hypersomnolence with slow HR. Pt was discharged from [facility] after an admit for [address] s/p fall at home on Monday 8/15/22. Pt was seen by neurosurgery and pt needed no surgical intervention as the repeat head CT was stable and subsequently eliquis was discontinued. . Pt dc'd home with f/u with neurosurgery. Pt son related to Dr. Choe Back that pt had a slow HR at [address] and is concerned that this is contributing to the patient's weakness and falls     Per family pt has fallen several times since dc Monday, has been sleeping most of the time since he left the hospital. Was seen in office for f/u yesterday by Dr. Ariane Thakkar, who recommended he come to the ER for further assessment. Past Medical History:   has a past medical history of Atrial fibrillation ([address] CHF (congestive heart failure) ([address] Hypertension, and Thyroid disease. Surgical History:   has a past surgical history that includes Carpal tunnel release (Bilateral); Lumbar spine surgery; and Cystoscopy. Social History:   reports that he has quit smoking. He has quit using smokeless tobacco. He reports that he does not currently use alcohol. Family History:  No evidence for sudden cardiac death or premature CAD    Home Medications:  Were reviewed and are listed in nursing record. and/or listed below  Prior to Admission medications    Medication Sig Start Date End Date Taking? Authorizing Provider   Probiotic Product (PROBIOTIC ADVANCED PO) Take 1 caplet by mouth daily    Historical Provider, MD   furosemide (LASIX) 40 MG tablet Take 40 mg by mouth in the morning. Historical Provider, MD   dilTIAZem (CARDIZEM) 30 MG tablet Take 30 mg by mouth in the morning.  PRN HR over 100 for more than 10 min. Historical Provider, MD   potassium chloride (MICRO-K) 10 MEQ extended release capsule Take 10 mEq by mouth in the morning. Historical Provider, MD   cefUROXime (CEFTIN) 500 MG tablet Take 1 tablet by mouth in the morning and 1 tablet before bedtime. Do all this for 10 days. 8/15/22 8/25/22  Mukund Sawyer MD   metroNIDAZOLE (FLAGYL) 500 MG tablet Take 1 tablet by mouth in the morning and 1 tablet at noon and 1 tablet before bedtime. Do all this for 10 days. 8/15/22 8/25/22  Mukund Sawyer MD   carvedilol (COREG) 3.125 MG tablet Take 3.125 mg by mouth in the morning and 3.125 mg in the evening. Take with meals. Historical Provider, MD   Cholecalciferol (VITAMIN D3) 50 MCG ([address]) CAPS Take by mouth 2 times daily    Historical Provider, MD   valsartan (DIOVAN) 40 MG tablet Take 40 mg by mouth in the morning.     Historical Provider, MD   fluticasone-umeclidin-vilant (Frosty Edinger) 100-62.5-25 MCG/INH AEPB Inhale 1 puff into the lungs daily    Historical Provider, MD   levothyroxine (SYNTHROID) 75 MCG tablet Take 75 mcg by mouth Daily    Historical Provider, MD   JANUVIA 50 MG tablet Take 50 mg by mouth daily  3/29/21   Historical Provider, MD   COMBIGAN 0.2-0.5 % ophthalmic solution Place 1 drop into both eyes every 12 hours  5/1/21   Historical Provider, MD   latanoprost (XALATAN) 0.005 % ophthalmic solution Place 1 drop into both eyes daily  5/21/20   Historical Provider, MD   Multiple Vitamin (MULTIVITAMIN) tablet Take 1 tablet by mouth daily    Historical Provider, MD        Hospital Medications:   carvedilol  3.125 mg Oral BID WC    latanoprost  1 drop Both Eyes Daily    levothyroxine  75 mcg Oral QAM AC    sodium chloride flush  5-40 mL IntraVENous 2 times per day    insulin lispro  0-4 Units SubCUTAneous TID WC    insulin lispro  0-4 Units SubCUTAneous Nightly    brimonidine  1 drop Both Eyes BID    And    timolol  1 drop Both Eyes BID    budesonide  0.25 mg Nebulization BID    And tiotropium-olodaterol  2 puff Inhalation Daily     perflutren lipid microspheres, glucose, dextrose bolus **OR** dextrose bolus, glucagon (rDNA), dextrose, sodium chloride flush, sodium chloride, ondansetron **OR** ondansetron, polyethylene glycol, acetaminophen **OR** acetaminophen   dextrose      sodium chloride Stopped (08/18/22 1614)       Allergies:  Naproxen     Review of Systems:     A 14 ROS obtained and negative except as mentioned in HPI. Constitutional: there has been no unanticipated weight loss. Eyes: No visual changes or diplopia. No scleral icterus. ENT: No Headaches, hearing loss or vertigo. No mouth sores or sore throat. Cardiovascular: No loss of consciousness. No hemoptysis, pleuritic pain, or phlebitis. Respiratory: No cough or wheezing, no sputum production. No hematemesis. Gastrointestinal: No abdominal pain, appetite loss, blood in stools. No change in bowel or bladder habits. Genitourinary: No dysuria, or hematuria. Musculoskeletal:  No gait disturbance, weakness or joint complaints. Integumentary: No rash or pruritis. Neurological: No headache, diplopia,numbness or tingling. No change in gait, balance, coordination, mood, affect, memory, mentation, behavior. Psychiatric: No anxiety,  Endocrine: No malaise,  Hematologic/Lymphatic: No abnormal bruising  Allergic/Immunologic: No nasal congestion      Physical Examination:    Vitals:    08/19/22 1656   BP: (!) 169/83   Pulse: 65   Resp: 18   Temp: 97.5 °F (36.4 °C)   SpO2: 96%    Weight: 123 lb 7.3 oz (56 kg)         General Appearance:  Alert, cooperative, no distress, appears stated age   Head:  Normocephalic, without obvious abnormality   Eyes:  PERRL   Nose: Nares normal,   Neck: Supple, JVP normal    Lungs:   Clear to auscultation bilaterally, respirations unlabored   Chest Wall:  No tenderness or deformity   Heart:  Regular rate and rhythm, normal S1, S2 normal, no murmur,  No rub.  No S3 gallop   Abdomen:   Soft, non-tender, +bowel sounds   Extremities: no cyanosis, no clubbing , No edema   Pulses: Symmetric extremities   Skin: no gross lesions or rashes   Pysch: Normal mood and affect  pt is alert and pleasant and conversant   Neurologic: No gross deficits. CN II - XII grossly intact        Labs  CBC:   Lab Results   Component Value Date/Time    WBC 8.9 08/18/2022 04:20 AM    RBC 3.63 08/18/2022 04:20 AM    HGB 10.6 08/18/2022 04:20 AM    HCT 32.5 08/18/2022 04:20 AM    MCV 89.6 08/18/2022 04:20 AM    RDW 16.5 08/18/2022 04:20 AM     08/18/2022 04:20 AM     CMP:    Lab Results   Component Value Date/Time     08/18/2022 04:20 AM    K 3.8 08/18/2022 04:20 AM    CL 97 08/18/2022 04:20 AM    CO2 26 08/18/2022 04:20 AM    BUN 25 08/18/2022 04:20 AM    CREATININE 1.3 08/18/2022 04:20 AM    GFRAA >60 08/18/2022 04:20 AM    AGRATIO 0.9 08/13/2022 08:52 AM    LABGLOM 52 08/18/2022 04:20 AM    GLUCOSE 116 08/18/2022 04:20 AM    PROT 7.4 08/13/2022 08:52 AM    CALCIUM 9.0 08/18/2022 04:20 AM    BILITOT <0.2 08/13/2022 08:52 AM    ALKPHOS 82 08/13/2022 08:52 AM    AST 23 08/13/2022 08:52 AM    ALT 14 08/13/2022 08:52 AM     PT/INR:  No results found for: PTINR  No results for input(s): CKTOTAL, CKMB, TROPONINI in the last 72 hours. EKG:  SR with PACs. [id number] Score for Atrial Fibrillation Stroke Risk   Risk   Factors  Component Value   C CHF No 0   H HTN No 0   A2 Age >= 76 Yes,  (82 y.o.) 2   D DM Yes 1   S2 Prior Stroke/TIA No 0   V Vascular Disease No 0   A Age 74-69 No,  (82 y.o.) 0   Sc Sex male 0    [id number]  Score  3   Score last updated 8/19/22 1:52 PM EDT    Click here for a link to the UpToDate guideline \"Atrial Fibrillation: Anticoagulation therapy to prevent embolization    Disclaimer: Risk Score calculation is dependent on accuracy of patient problem list and past encounter diagnosis.       Assessment  Patient Active Problem List   Diagnosis    Pneumonia due to COVID-19 virus    Type 2 diabetes mellitus without complication (HCC)    COPD (chronic obstructive pulmonary disease) (HCC)    Acquired hypothyroidism    Acute respiratory failure with hypoxia (HCC)    Brain bleed (HCC)    Acute intracranial hemorrhage (HCC)    Intracranial hemorrhage (HCC)    Intracranial hemangioma (HCC)        Impression:  Paroxysmal atrial fibrillation. Bradycardia reported. No chest pain whatsoever. Recommendations:    I had the opportunity to review the clinical symptoms and presentation of Priscila Mendes. I recommend that the patient undergo further cardiac evaluation with  echo. I recommend that the patient be treated with no rate slowing medications. Pt appears to be in SR with no evidence of block on ECG. Agree with no AC given recent ICH. Monitor on tele for bradycardia. Possible place a long term continuous monitor at discharge to assess for bradycardia. Pt seems much more lucid today than is described in previous notes. Thank you for allowing to us to participate in the care or Priscila Mendes. All questions and concerns were addressed to the patient/family. Alternatives to my treatment were discussed. The note was completed using EMR. Every effort was made to ensure accuracy; however, inadvertent computerized transcription errors may be present.        Elissa Ambriz MD [facility]

## 2022-08-19 NOTE — PROGRESS NOTES
Physical Therapy    Daily Treatment Note      Discharge Recommendations:  Stevie Cline scored a 16/24 on the AM-PAC short mobility form. Current research shows that an AM-PAC score of 17 or less is typically not associated with a discharge to the patient's home setting. Based on the patient's AM-PAC score and their current functional mobility deficits, it is recommended that the patient have 3-5 sessions per week of Physical Therapy at d/c to increase the patient's independence. Please see assessment section for further patient specific details. Assessment:  Pt more alert and oriented today. Pt less ataxic with ambulation, but remains unsteady while using rolling walker. Pt is at risk for falls and not safe to mobilize without assistance. Pt would benefit from ongoing skilled PT at d/c to increase strength, balance and gait. Plan is for SNF. Equipment Needs:  None    Chart Reviewed: Yes     Other position/activity restrictions: Up with assist   Additional Pertinent Hx: Pt to ED 8/17 with ICH and falls. Pt discharged home from [facility] 8/15 with [address] and continued decline at home, falling 4 times. Head Imgaing: shows hemorrhage has decreased. Spine Imaging: Multilevel discogenic/spondylotic changes. Neurosurgery consult. PMH:  Afib, CHF, HTN, Covid-19, falls, ICH      Diagnosis: ICH   Treatment Diagnosis: Decreased gait associated with ICH. Subjective:  Pt found supine in bed. Pt agreeable for PT. \"It feels good to get up. \"     Pain:  Denies      Objective:    Bed mobility  Supine to sit:  Min A (HOB raised, verbal cues for safety, use of rail)    Transfers  Sit to stand:  Min A (from bed, toilet and chair heights to walker, cues for hand placement and safety)  Stand to sit:  Min A (cues for hand placement and safety, cues for safe alignment before sitting)    Ambulation  Assistance Level:  Min A   Assistive device:  Rolling walker  Distance:  20ft + 20ft + 35ft   Quality of gait:  Ataxic gait, unsteady, cues for walker safety  Other:  2L O2    Neuro/balance  Sitting:  SBA  unsupported EOB  Static stance:  CGA with walker  Dynamic stance:  Min A with walker    Patient Education  Role of PT and d/c plans. Pt verbalized partial understanding and would benefit from reinforcement. Safety Devices  Pt left with needs in reach, seated in chair with alarm in place and RN aware. AM-PAC score  AM-PAC Inpatient Mobility Raw Score : 16  AM-PAC Inpatient T-Scale Score : 40.78  Mobility Inpatient CMS 0-100% Score: 54.16  Mobility Inpatient CMS G-Code Modifier : CK    Goals:  Goals not met this treatment, continue with current goals. Time Frame for Short term goals: Discharge  Short term goal 1: supine <> sit SBA   Short term goal 2: sit <> stand SBA   Short term goal 3: ambulate 150ft with rolling walker SBA          Plan:  2-5x/week  Current Treatment Recommendations: Transfer training, Functional mobility training, Strengthening, Gait training    Therapy Time    Individual  Concurrent  Group  Co-treatment    Time In  1130            Time Out  1208            Minutes  38              Timed Code Treatment Minutes:  38  Total Treatment Minutes:  38      If patient is discharged prior to next treatment, this note will serve as the discharge summary.     Kiana Reed, PT

## 2022-08-19 NOTE — CARE COORDINATION
Case Management Assessment           Initial Evaluation                Date / Time of Evaluation: 8/19/2022 5:04 PM                 Assessment Completed by: Simone Thibodeaux RN    Patient Name: Tramaine Pardo     YOB: 1935  Diagnosis: Intracranial hemorrhage ([facility] [I62.9]  Falls frequently [R29.6]  Abdominal aortic aneurysm (AAA) without rupture ([facility] [I71.4]  Intracranial hemangioma ([facility] [D18.02]     Date / Time: 8/17/2022  4:16 PM    Patient Admission Status: Inpatient    If patient is discharged prior to next notation, then this note serves as note for discharge by case management. Current PCP: Rashel Ryan MD  Clinic Patient: No    Chart Reviewed: Yes  Patient/ Family Interviewed: Yes    Initial assessment completed at bedside with:     Hospitalization in the last 30 days: No    Emergency Contacts:  Extended Emergency Contact Information  Primary Emergency Contact: Braden Cunningham   [address] Phone: [phone]  Relation: Child    Advance Directives:   Code Status: Full Code      Financial  Payor: Thaddeusjayden Ranks / Plan: Jason Has HMO / Product Type: Medicare /     Pre-cert required for SNF: Yes    Pharmacy    [facility] [id number] [address] [phone] [facility] [phone]  [address] [place] [address]  Phone: [phone] Fax: [phone]      Potential assistance Purchasing Medications: Potential Assistance Purchasing Medications: No  Does Patient want to participate in local refill/ meds to beds program?: No    Meds To Beds General Rules:  1. Can ONLY be done Monday- Friday between 8:30am-5pm  2. Prescription(s) must be in pharmacy by 3pm to be filled same day  3. Copy of patient's insurance/ prescription drug card and patient face sheet must be sent along with the prescription(s)  4. Cost of Rx cannot be added to hospital bill.  If financial assistance is needed, please contact unit  or ;  or  CANNOT provide pharmacy voucher for patients co-pays  5. Patients can then  the prescription on their way out of the hospital at discharge, or pharmacy can deliver to the bedside if staff is available. (payment due at time of pick-up or delivery - cash, check, or card accepted)     Able to afford home medications/ co-pay costs: SNF    ADLS  Support Systems: Children    PT AM-PAC: 16 /24  OT AM-PAC: 17 /24    [place]: friend lives with him  Steps: 2    Plans to RETURN to current housing: No  Barriers to RETURNING to current housing: unable to  care for self    Pavithra Brenner 80  Currently ACTIVE with [address]: Yes  [address]: [address]  Phone: [phone] Fax: [phone]    Currently ACTIVE with [place] on Aging: No      Home Oxygen and [address] prior to admission: Yes  [address]: [facility]  Phone: [phone]   Other Respiratory Equipment: no    Will be discharged to SNF   Dialysis  Active with HD/PD prior to admission: No    DISCHARGE PLAN:  Disposition: [facility]  Phone [phone]  Fax: [phone]  Transportation PLAN for discharge: EMS transportation     Factors facilitating achievement of predicted outcomes: Family support and Cooperative    Barriers to discharge: Confusion, Decreased endurance, and ataxia, incontinence    Additional Case Management Notes: Patient remains inpatient by neuro for testing possible NPH.  Awaiting test results     The Plan for Transition of Care is related to the following treatment goals of Intracranial hemorrhage ([address] [I62.9]  Falls frequently [R29.6]  Abdominal aortic aneurysm (AAA) without rupture ([address] [I71.4]  Intracranial hemangioma ([address] [D18.02]    The Patient and/or patient representative Mariella Molina and his family were provided with a choice of provider and agrees with the discharge plan Yes    Freedom of choice list was provided with basic dialogue that supports the patient's individualized plan of care/goals and shares the quality data associated with the providers.  Yes    Care Transition patient: Yes    Hallie Sheikh RN  [facility]  Case Management Department  Ph: [phone]

## 2022-08-19 NOTE — CARE COORDINATION
[facility] to see the status of referral. They are reviewing and will call CM back when they have reached a determination to accept or not. CM will continue to follow patient until discharge. Electronically signed by Hallie Sheikh RN on 8/19/2022 at 11:08 AM    Addendum:  [facility] called and they are starting precert and they said they will extend it if need be. CM will continue to follow patient until discharge.   Electronically signed by Hallie Sheikh RN on 8/19/2022 at 11:37 AM

## 2022-08-19 NOTE — DISCHARGE INSTR - COC
Continuity of Care Form    Patient Name: Osvaldo Stokes   :  1935  MRN:  [id number]    Admit date:  2022  Discharge date:  22      Code Status Order: Full Code   Advance Directives:     Admitting Physician:  Ivy Mancini MD  PCP: Latia Meeks MD    Discharging Nurse: [facility] Unit/Room#: 7463/7854-74  Discharging Unit Phone Number: [phone]    Emergency Contact:   Extended Emergency Contact Information  Primary Emergency Contact: OctavioBraden   [address] Phone: [phone]  Relation: Child    Past Surgical History:  Past Surgical History:   Procedure Laterality Date    CARPAL TUNNEL RELEASE Bilateral     CYSTOSCOPY      LUMBAR SPINE SURGERY         Immunization History:   Immunization History   Administered Date(s) Administered    COVID-19, PFIZER PURPLE top, DILUTE for use, (age 15 y+), 30mcg/0.3mL 10/26/2021       Active Problems:  Patient Active Problem List   Diagnosis Code    Pneumonia due to COVID-19 virus U07.1, J12.82    Type 2 diabetes mellitus without complication (HCC) W22.1    COPD (chronic obstructive pulmonary disease) ([address] J44.9    Acquired hypothyroidism E03.9    Acute respiratory failure with hypoxia ([address] J96.01    Brain bleed ([address] I61.9    Acute intracranial hemorrhage ([address] I62.9    Intracranial hemorrhage ([address] I62.9    Intracranial hemangioma ([address] D18.02       Isolation/Infection:   Isolation            No Isolation          Patient Infection Status       Infection Onset Added Last Indicated Last Indicated By Review Planned Expiration Resolved Resolved By    None active    Resolved    COVID-19 21 COVID-19   10/13/21     21 positive rapid at PCP office    C-diff Rule Out 20 Gastrointestinal Panel, Molecular (Ordered)   20 Rule-Out Test Resulted            Nurse Assessment:  Last Vital Signs: BP (!) 140/66   Pulse 81   Temp 98.1 °F (36.7 °C) (Oral) report to MD:  Worsening Heart Failure: sudden weight gain, shortness of breath, lower extremity or general edema/swelling, abdominal bloating/swelling, inability to lie flat, intolerance to usual activity, or cough (especially at night). Report these finding even if no increase in weight. Dehydration:  having difficulty or a decrease in urination, dizziness, worsening fatigue, or new onset/worsening of generalized weakness. Please continue a LOW SODIUM diet and LIMIT fluid intake to 48 - 64 ounces ( 1.5 - 2 liters) per day. Call [facility] [phone], Nava Cisse [phone], and facility MD with any questions or concerns. Please continue heart failure education to patient and family/support system. See After Visit Summary for hospital follow up appointment details. Consider spiritual care referral for support and/or completion of advance directives . Consider: having the facility MD complete required 7 day follow up, [place] [address] telehealth program if patient agreeable and able to participate, and palliative care consult for ongoing goals of care, end of life, and/or chronic disease management discussions. Patient's primary cardiologist is Dr Bard Mora.       Rehab Therapies: Physical Therapy and Occupational Therapy  Weight Bearing Status/Restrictions: No weight bearing restrictions  Other Medical Equipment (for information only, NOT a DME order):  walker  Other Treatments: ***    Patient's personal belongings (please select all that are sent with patient):  Glasses, Hearing Aides bilateral    RN SIGNATURE:  Electronically signed by Sofia Dunn RN on 8/23/22 at 12:58 PM EDT    CASE MANAGEMENT/SOCIAL WORK SECTION    Inpatient Status Date: 8-18-22    Readmission Risk Assessment Score:  Readmission Risk              Risk of Unplanned Readmission:  19           Discharging to Facility/ [address] SNF  Phone [phone]  Fax: [phone]      /Social Worker signature: Electronically signed by Reji Paz RN on 8/19/22 at 5:16 PM EDT    PHYSICIAN SECTION    Prognosis: Fair    Condition at Discharge: Stable    Rehab Potential (if transferring to Rehab): Fair    Recommended Labs or Other Treatments After Discharge: pt/ot    Physician Certification: I certify the above information and transfer of Lisa Pacheco  is necessary for the continuing treatment of the diagnosis listed and that he requires [facility] for greater 30 days.      Update Admission H&P: No change in H&P    PHYSICIAN SIGNATURE:  Electronically signed by Rosa Maria Long MD on 8/23/2022 at 2:27 PM

## 2022-08-19 NOTE — PROGRESS NOTES
Occupational Therapy  Daily Treatment Note  Patient Name: Stevie Cline  MRN: [id number]    Assessment: Pt tolerated therapy well. He was a bit steadier today and tolerated more functional mobility but remains ataxic and is unsafe to be up without assist. He requires assist with all mobility related ADLs as well. Recommend continued inpt OT/PT at d/c prior to return home. Discharge Recommendations: Stevie Cline scored a 17/24 on the AM-PAC ADL Inpatient form. Current research shows that an AM-PAC score of 17 or less is typically not associated with a discharge to the patient's home setting. Based on the patient's AM-PAC score and their current ADL deficits, it is recommended that the patient have 3-5 sessions per week of Occupational Therapy at d/c to increase the patient's independence. Please see assessment section for further patient specific details. Equipment Needs:  No    Chart Reviewed: Yes     Other position/activity restrictions: Up with assist   Additional Pertinent Hx: Pt to ED 8/17 with ICH and falls. Pt discharged [facility] 8/15 with [address] and continued decline at home, falling 4 times. Head Imgaing: shows hemorrhage has decreased. Spine Imaging: Multilevel discogenic/spondylotic changes. Neurosurgery consult. PMH:  Afib, CHF, HTN, Covid-19, falls, ICH    Diagnosis: Intracranial hemorrhage  Treatment Diagnosis: impaired ADLs and functional mobility/transfers    Subjective: Pt in bed on entry. Pleasant and cooperative with therapy.       Pain: No c/o pain      Objective:    Cognition/Orientation:alert, Ox3, delayed responses, requires repetition of cues/instruction, converses appropriately    Bed mobility   Supine to sit: SBA    Functional Mobility   Sit to Stand: Min assist, min cues (from bed, chair, toilet)  Stand to Sit: Min assist, min cues  Chair Transfer: Min assist, min cues, walker  Commode Transfer: Min assist, min cues, walker, grab bar  Ambulation: to/from bathroom for toileting and hand hygiene at sink, then to/from hallway. Pt varies CGA to min assist. Demonstrates ataxia, impaired balance particularly when turning and backing up to transfer surface. Time in stance: 2 min + 4 min + 2 min     ADLs   Grooming: Mod I, set up (washed hands at sink without cues; washed face and hair, combed hair and brushed teeth seated in chair with set up)  LB dressing: Min assist (assist to tie pants)  Toileting: Min assist (clothing management)    Activity Tolerance: Tolerated session well    Patient Education: Transfers, ADLs - verb understanding    Safety Devices in Place: left in chair, alarm on, needs in reach, RN aware of assist level - Ax1 transfers with walker, Ax2 ambulation       Goals:  Short Term Goals  Time Frame for Short term goals: by dc  Short Term Goal 1: Pt will complete functional transfers, including toilet transfer, w/ SBA - Not met  Short Term Goal 2: Pt will participate in LE dressing assessment - Not met  Short Term Goal 3: Complete toileting with SBA - Not met  Short Term Goal 4: Maintain standing balance SBA during ADLs - Not met         Plan:      Times per Week: 2-5  Times per Day: Daily    If patient is discharged prior to next treatment, this note will serve as the discharge summary.       Therapy Time   Individual Concurrent Group Co-treatment   Time In 1130         Time Out 1208         Minutes 38            Timed Code Treatment Minutes: 38  Total Treatment Time: 5360 Abrahan Arteaga OT

## 2022-08-20 LAB
GLUCOSE BLD-MCNC: 135 MG/DL (ref 70–99)
GLUCOSE BLD-MCNC: 189 MG/DL (ref 70–99)
GLUCOSE BLD-MCNC: 215 MG/DL (ref 70–99)
GLUCOSE BLD-MCNC: 259 MG/DL (ref 70–99)
MENINGITIS ENCEPHALITIS PANEL: NORMAL
PERFORMED ON: ABNORMAL
REPORT: NORMAL

## 2022-08-20 PROCEDURE — 94640 AIRWAY INHALATION TREATMENT: CPT

## 2022-08-20 PROCEDURE — 94761 N-INVAS EAR/PLS OXIMETRY MLT: CPT

## 2022-08-20 PROCEDURE — 2580000003 HC RX 258: Performed by: INTERNAL MEDICINE

## 2022-08-20 PROCEDURE — 2700000000 HC OXYGEN THERAPY PER DAY

## 2022-08-20 PROCEDURE — 93320 DOPPLER ECHO COMPLETE: CPT

## 2022-08-20 PROCEDURE — 93325 DOPPLER ECHO COLOR FLOW MAPG: CPT

## 2022-08-20 PROCEDURE — 93308 TTE F-UP OR LMTD: CPT

## 2022-08-20 PROCEDURE — 6370000000 HC RX 637 (ALT 250 FOR IP): Performed by: INTERNAL MEDICINE

## 2022-08-20 PROCEDURE — 1200000000 HC SEMI PRIVATE

## 2022-08-20 PROCEDURE — 6360000002 HC RX W HCPCS: Performed by: INTERNAL MEDICINE

## 2022-08-20 RX ADMIN — SODIUM CHLORIDE, PRESERVATIVE FREE 10 ML: 5 INJECTION INTRAVENOUS at 10:07

## 2022-08-20 RX ADMIN — CARVEDILOL 3.12 MG: 3.12 TABLET, FILM COATED ORAL at 10:02

## 2022-08-20 RX ADMIN — LATANOPROST 1 DROP: 50 SOLUTION OPHTHALMIC at 10:03

## 2022-08-20 RX ADMIN — TIOTROPIUM BROMIDE AND OLODATEROL 2 PUFF: 3.124; 2.736 SPRAY, METERED RESPIRATORY (INHALATION) at 10:01

## 2022-08-20 RX ADMIN — BUDESONIDE 250 MCG: 0.25 SUSPENSION RESPIRATORY (INHALATION) at 20:56

## 2022-08-20 RX ADMIN — SODIUM CHLORIDE, PRESERVATIVE FREE 10 ML: 5 INJECTION INTRAVENOUS at 20:59

## 2022-08-20 RX ADMIN — BUDESONIDE 250 MCG: 0.25 SUSPENSION RESPIRATORY (INHALATION) at 19:42

## 2022-08-20 RX ADMIN — TIMOLOL MALEATE 1 DROP: 5 SOLUTION OPHTHALMIC at 10:02

## 2022-08-20 RX ADMIN — BUDESONIDE 250 MCG: 0.25 SUSPENSION RESPIRATORY (INHALATION) at 10:01

## 2022-08-20 RX ADMIN — INSULIN LISPRO 2 UNITS: 100 INJECTION, SOLUTION INTRAVENOUS; SUBCUTANEOUS at 13:29

## 2022-08-20 RX ADMIN — BRIMONIDINE TARTRATE 1 DROP: 2 SOLUTION OPHTHALMIC at 10:03

## 2022-08-20 RX ADMIN — CARVEDILOL 3.12 MG: 3.12 TABLET, FILM COATED ORAL at 16:29

## 2022-08-20 RX ADMIN — LEVOTHYROXINE SODIUM 75 MCG: 0.07 TABLET ORAL at 05:30

## 2022-08-20 RX ADMIN — TIMOLOL MALEATE 1 DROP: 5 SOLUTION OPHTHALMIC at 20:59

## 2022-08-20 RX ADMIN — BRIMONIDINE TARTRATE 1 DROP: 2 SOLUTION OPHTHALMIC at 20:58

## 2022-08-20 ASSESSMENT — PAIN SCALES - GENERAL
PAINLEVEL_OUTOF10: 0

## 2022-08-20 NOTE — FLOWSHEET NOTE
08/19/22 1959   Assessment   Charting Type Shift assessment   Psychosocial   Psychosocial (WDL) WDL   Neurological   Neuro (WDL) WDL   Level of Consciousness Alert (0)   Orientation Level Oriented X4   Cognition Appropriate judgement; Appropriate attention/concentration; Appropriate safety awareness; Appropriate for developmental age; Follows commands   Speech Clear   R Pupil Size (mm) 3   R Pupil Shape Round   R Pupil Reaction Brisk   L Pupil Size (mm) 3   L Pupil Shape Round   L Pupil Reaction Brisk   R Hand  Strong   L Hand  Strong   R Foot Dorsiflexion Moderate   L Foot Dorsiflexion Moderate   R Foot Plantar Flexion Moderate   L Foot Plantar Flexion Moderate   RUE Motor Response Normal extension;Normal flexion; Responds to command   LUE Motor Response Normal extension;Normal flexion; Responds to command   RLE Motor Response Normal extension;Normal flexion; Responds to command   LLE Motor Response Normal extension;Normal flexion; Responds to command   Tongue Deviation None   Gag Present   [place] Coma Scale   Eye Opening 4   Best Verbal Response 5   Best Motor Response 6   [place] Coma Scale Score 15   HEENT (Head, Ears, Eyes, Nose, & Throat)   HEENT (WDL) X   Right Ear Hearing aid; Impaired hearing   Left Ear Hearing aid; Impaired hearing   Right Eye Impaired vision   Left Eye Impaired vision   Respiratory   Respiratory (WDL) X   Respiratory Interventions H.O.B. elevated   Respiratory Pattern Regular   Respiratory Depth Normal   Respiratory Quality/Effort Unlabored   Chest Assessment Chest expansion symmetrical;Trachea midline   L Breath Sounds Diminished   R Breath Sounds Diminished   Level of Activity/Mobility 1   Subcutaneous Air/Crepitus None   Cough/Sputum   Sputum How Obtained Cough on request   Cough Non-productive   Cardiac   Cardiac (WDL) X   Cardiac Regularity Regular   Heart Sounds S1, S2   Cardiac Rhythm Sinus rhythm;Sinus rhythm with PAC   Rhythm Interpretation   Heart Rate 69   Cardiac Monitor Cardiac/Telemetry Monitor On Portable telemetry pack applied   Gastrointestinal   Abdominal (WDL) X   Abdomen Inspection Soft;Rounded   Last BM (including prior to admit) 08/18/22   RUQ Bowel Sounds Hypoactive   LUQ Bowel Sounds Hypoactive   RLQ Bowel Sounds Hypoactive   LLQ Bowel Sounds Hypoactive   Genitourinary   Genitourinary (WDL) WDL   Urine Assessment   Urinary Status Voiding   Urinary Incontinence Absent   Urine Color JESUS   Urine Appearance JESUS   Urine Odor JESUS   Peripheral Vascular   Peripheral Vascular (WDL) X   RUE Neurovascular Assessment   Capillary Refill Less than/Equal to 3 seconds   Color Pale   Temperature Cool   RUE Sensation  Full sensation; No pain; No numbness; No tingling   R Radial Pulse +2 (Moderate)   LUE Neurovascular Assessment   Capillary Refill Less than/Equal to 3 seconds   Color Pale   Temperature Cool   LUE Sensation  Full sensation   L Radial Pulse +2 (Moderate)   RLE Neurovascular Assessment   Capillary Refill Less than/Equal to 3 seconds   Color Pale   Temperature Cool   RLE Sensation  Full sensation; No numbness; No pain; No tingling   R Post Tibial Pulse +1 (Weak)   LLE Neurovascular Assessment   Capillary Refill Less than/Equal to 3 seconds   Color Pale   Temperature Cool   LLE Sensation  Full sensation; No numbness; No pain; No tingling   L Post Tibial Pulse +1 (Weak)   Musculoskeletal   Musculoskeletal (WDL) X   RL Extremity Weakness; Unsteady   LL Extremity Weakness; Unsteady   Care Plan - Chronic Conditions and Co-Morbidity   Care Plan - Patient's Chronic Conditions and Co-Morbidity Symptoms are Monitored and Maintained or Improved Monitor and assess patient's chronic conditions and comorbid symptoms for stability, deterioration, or improvement   Care Plan - Discharge Planning Goals   Discharge to home or other facility with appropriate resources Identify barriers to discharge with patient and caregiver   Handoff   Communication Given Shift Handoff   Handoff Given To [facility] Handoff Received From [address] [place] [address] Road Communication At bedside   Time Handoff Given 0875   End of Shift Check Performed Yes

## 2022-08-20 NOTE — PLAN OF CARE
Problem: Pain  Goal: Verbalizes/displays adequate comfort level or baseline comfort level  Outcome: Progressing  Flowsheets (Taken 8/19/2022 2001)  Verbalizes/displays adequate comfort level or baseline comfort level:   Encourage patient to monitor pain and request assistance   Assess pain using appropriate pain scale   Administer analgesics based on type and severity of pain and evaluate response   Implement non-pharmacological measures as appropriate and evaluate response  Note: Monitoring pain trend, implementing non-pharm interventions and also admins PRN pain medication. Problem: Safety - Adult  Goal: Free from fall injury  Outcome: Progressing  Flowsheets (Taken 8/19/2022 2001)  Free From Fall Injury: Instruct family/caregiver on patient safety     Problem: Skin/Tissue Integrity  Goal: Absence of new skin breakdown  Description: 1. Monitor for areas of redness and/or skin breakdown  2. Assess vascular access sites hourly  3. Every 4-6 hours minimum:  Change oxygen saturation probe site  4. Every 4-6 hours:  If on nasal continuous positive airway pressure, respiratory therapy assess nares and determine need for appliance change or resting period.   Outcome: Progressing  Note: No skin issues at this time     Problem: Chronic Conditions and Co-morbidities  Goal: Patient's chronic conditions and co-morbidity symptoms are monitored and maintained or improved  Outcome: Progressing  Flowsheets (Taken 8/19/2022 2001)  Care Plan - Patient's Chronic Conditions and Co-Morbidity Symptoms are Monitored and Maintained or Improved:   Monitor and assess patient's chronic conditions and comorbid symptoms for stability, deterioration, or improvement   Collaborate with multidisciplinary team to address chronic and comorbid conditions and prevent exacerbation or deterioration   Update acute care plan with appropriate goals if chronic or comorbid symptoms are exacerbated and prevent overall improvement and discharge

## 2022-08-20 NOTE — PLAN OF CARE
Problem: Pain  Goal: Verbalizes/displays adequate comfort level or baseline comfort level  Outcome: Progressing  Flowsheets  Taken 8/20/2022 1644 by Ivonne Erazo RN  Verbalizes/displays adequate comfort level or baseline comfort level:   Encourage patient to monitor pain and request assistance   Assess pain using appropriate pain scale   Administer analgesics based on type and severity of pain and evaluate response   Implement non-pharmacological measures as appropriate and evaluate response   Consider cultural and social influences on pain and pain management  Taken 8/20/2022 0428 by Brady Tolentino RN  Verbalizes/displays adequate comfort level or baseline comfort level: Encourage patient to monitor pain and request assistance     Problem: Safety - Adult  Goal: Free from fall injury  Outcome: Progressing  Flowsheets (Taken 8/20/2022 1644)  Free From Fall Injury: Instruct family/caregiver on patient safety     Problem: Chronic Conditions and Co-morbidities  Goal: Patient's chronic conditions and co-morbidity symptoms are monitored and maintained or improved  Outcome: Progressing  Flowsheets (Taken 8/20/2022 1644)  Care Plan - Patient's Chronic Conditions and Co-Morbidity Symptoms are Monitored and Maintained or Improved:   Monitor and assess patient's chronic conditions and comorbid symptoms for stability, deterioration, or improvement   Collaborate with multidisciplinary team to address chronic and comorbid conditions and prevent exacerbation or deterioration   Update acute care plan with appropriate goals if chronic or comorbid symptoms are exacerbated and prevent overall improvement and discharge

## 2022-08-20 NOTE — PROGRESS NOTES
Hospitalist Progress Note      PCP: Jose Luis Suazo MD    Date of Admission: 8/17/2022    Chief Complaint: Weakness and fall    Hospital Course: This is a very pleasant 82 y.o. male with a history of multiple medical problems presenting with weakness and falls. Pt was discharged from [facility] Monday after an admit for ICH s/p fall at home. Was admitted, seen by neurosurgery, no surgical intervention needed. Repeat head CT stable, eliquis stopped. Pt dc'd home with f/u with neurosurgery. Per family pt has fallen several times since dc Monday, has been sleeping most of the time since he left the hospital. Was seen in office for f/u yesterday by Dr. Kat Ritchie, who recommended he come to the ER for further assessment. Repeat head CT as noted below, questionable change, not clear at this time. Orthostatic negative. MRI brain performed with no evidence of acute events. Cervical MRI with advanced cervical spondylosis with associated disc herniations resulting in varying degrees of central canal and foraminal stenosis. Subjective:   Patient is more lucid this morning and able to answer questions appropriately. No complaints of pain. LP yesterday was inconclusive. Current plan for echo per cardiology and pending SNF.     Medications:  Reviewed    Infusion Medications    dextrose      sodium chloride Stopped (08/18/22 1614)     Scheduled Medications    carvedilol  3.125 mg Oral BID WC    latanoprost  1 drop Both Eyes Daily    levothyroxine  75 mcg Oral QAM AC    sodium chloride flush  5-40 mL IntraVENous 2 times per day    insulin lispro  0-4 Units SubCUTAneous TID WC    insulin lispro  0-4 Units SubCUTAneous Nightly    brimonidine  1 drop Both Eyes BID    And    timolol  1 drop Both Eyes BID    budesonide  0.25 mg Nebulization BID    And    tiotropium-olodaterol  2 puff Inhalation Daily     PRN Meds: perflutren lipid microspheres, glucose, dextrose bolus **OR** dextrose bolus, glucagon (rDNA), dextrose, sodium chloride flush, sodium chloride, ondansetron **OR** ondansetron, polyethylene glycol, acetaminophen **OR** acetaminophen      Intake/Output Summary (Last 24 hours) at 8/20/2022 1554  Last data filed at 8/20/2022 1340  Gross per 24 hour   Intake 640 ml   Output 825 ml   Net -185 ml       Physical Exam Performed:    BP (!) 169/71   Pulse 78   Temp 97.4 °F (36.3 °C) (Oral)   Resp 18   Ht 5' 4\" (1.626 m)   Wt 123 lb 1.6 oz (55.8 kg)   SpO2 99%   BMI 21.13 kg/m²     General appearance: No apparent distress, appears stated age and cooperative. HEENT: Pupils equal, round, and reactive to light. Conjunctivae/corneas clear. Neck: Supple, with full range of motion. No jugular venous distention. Trachea midline. Respiratory:  Normal respiratory effort. Clear to auscultation, bilaterally without Rales/Wheezes/Rhonchi. Cardiovascular: Regular rate and rhythm with normal S1/S2 without murmurs, rubs or gallops. Abdomen: Soft, non-tender, non-distended with normal bowel sounds. Musculoskeletal: No clubbing, cyanosis or edema bilaterally. Full range of motion without deformity. Skin: Skin color, texture, turgor normal.  No rashes or lesions. Neurologic: Nonfocal  Psychiatric: Awake and alert thought content appropriate, normal insight      Labs:   Recent Labs     08/18/22  0420   WBC 8.9   HGB 10.6*   HCT 32.5*        Recent Labs     08/18/22  0420      K 3.8   CL 97*   CO2 26   BUN 25*   CREATININE 1.3   CALCIUM 9.0     No results for input(s): AST, ALT, BILIDIR, BILITOT, ALKPHOS in the last 72 hours. Recent Labs     08/17/22  1937/22  0917   INR 1.18* 1.11     No results for input(s): Amy Risk in the last 72 hours.     Urinalysis:      Lab Results   Component Value Date/Time    NITRU Negative 08/17/2022 06:19 PM    WBCUA 0-2 08/17/2022 06:19 PM    RBCUA None seen 08/17/2022 06:19 PM    BLOODU Negative 08/17/2022 06:19 PM    SPECGRAV 1.010 08/17/2022 06:19 PM    GLUCOSEU Negative 08/17/2022 06:19 PM       Radiology:  [facility] LUMBAR PUNCTURE DIAG   Final Result   1. Uneventful diagnostic fluoroscopic guided lumbar puncture   2. Opening pressure of 16 cm water with xanthochromic CSF fluid noted      MRI CERVICAL SPINE WO CONTRAST   Final Result   1. Advanced cervical spondylosis with associated disc herniations resulting in varying degrees of central canal and foraminal stenosis as detailed above. MRI BRAIN WO CONTRAST   Final Result   1. Small amount of hemorrhage layering in the occipital horns of the bilateral lateral ventricles. FLAIR hyperintensity also noted in the bilateral subarachnoid space, compatible with some subarachnoid hemorrhage, within the mid and posterior/dependent    brain, involving bilateral frontal, parietal, occipital and temporal regions. 2. Susceptibility artifact along the lateral aspect of the left lateral ventricle body, most compatible with subependymal parenchymal hemorrhage, similar to prior CT. 3. Diffusion restriction compatible with recent small ischemic infarct along the medial right frontal cortex. 4. Moderate to severe diffuse cerebral atrophy with superimposed moderate to severe small vessel ischemic disease. MRI THORACIC SPINE W WO CONTRAST   Final Result      Unremarkable thoracic spine MRI. MRI LUMBAR SPINE W WO CONTRAST   Final Result      Multilevel discogenic/spondylotic changes with multilevel canal and foraminal narrowing as above. Abdominal aortic aneurysm. No abnormal enhancement following contrast administration. CT CERVICAL SPINE WO CONTRAST   Final Result      No acute fracture identified. CT LUMBAR SPINE WO CONTRAST   Final Result      Spondylotic changes as above. No acute fracture seen. Abdominal aortic aneurysm measuring up to 3.2 cm. CT THORACIC SPINE WO CONTRAST   Final Result      Thoracic scoliosis. No acute fracture seen.       XR CHEST PORTABLE   Final Result Mild left basilar atelectasis or infiltrate. CT HEAD WO CONTRAST   Final Result      Persistent intraventricular hemorrhage with small amount of suspected parenchymal bleed along the lateral aspect of the posterior horn of the left lateral ventricle. The extent of the hemorrhage has decreased in comparison the prior study but interval    rebleed is a potential consideration. Results called to Dr. Rachel Brannon at 7:15 PM on 8/17/2022. 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 Patient on 2 L chronically.  -Continue levothyroxine. TSH 1.27 on 8/19/2022. DVT Prophylaxis: None given intracranial hemorrhage  Diet: ADULT DIET; Regular; 3 carb choices (45 gm/meal); Low Fat/Low Chol/High Fiber/2 gm Na  Code Status: Full Code    PT/OT Eval Status:  Following    Dispo -SNF pending    Julio Klein MD

## 2022-08-21 PROBLEM — I10 HYPERTENSION: Status: ACTIVE | Noted: 2022-08-21

## 2022-08-21 LAB
ANION GAP SERPL CALCULATED.3IONS-SCNC: 11 MMOL/L (ref 3–16)
BASOPHILS ABSOLUTE: 0 K/UL (ref 0–0.2)
BASOPHILS RELATIVE PERCENT: 0.5 %
BUN BLDV-MCNC: 31 MG/DL (ref 7–20)
CALCIUM SERPL-MCNC: 8.7 MG/DL (ref 8.3–10.6)
CHLORIDE BLD-SCNC: 101 MMOL/L (ref 99–110)
CO2: 25 MMOL/L (ref 21–32)
CREAT SERPL-MCNC: 1.3 MG/DL (ref 0.8–1.3)
EOSINOPHILS ABSOLUTE: 0.3 K/UL (ref 0–0.6)
EOSINOPHILS RELATIVE PERCENT: 3.9 %
GFR AFRICAN AMERICAN: >60
GFR NON-AFRICAN AMERICAN: 52
GLUCOSE BLD-MCNC: 158 MG/DL (ref 70–99)
GLUCOSE BLD-MCNC: 203 MG/DL (ref 70–99)
GLUCOSE BLD-MCNC: 215 MG/DL (ref 70–99)
GLUCOSE BLD-MCNC: 216 MG/DL (ref 70–99)
GLUCOSE BLD-MCNC: 231 MG/DL (ref 70–99)
HCT VFR BLD CALC: 31.6 % (ref 40.5–52.5)
HEMOGLOBIN: 10.2 G/DL (ref 13.5–17.5)
LYMPHOCYTES ABSOLUTE: 0.7 K/UL (ref 1–5.1)
LYMPHOCYTES RELATIVE PERCENT: 7.9 %
MCH RBC QN AUTO: 28.6 PG (ref 26–34)
MCHC RBC AUTO-ENTMCNC: 32.3 G/DL (ref 31–36)
MCV RBC AUTO: 88.4 FL (ref 80–100)
MONOCYTES ABSOLUTE: 0.9 K/UL (ref 0–1.3)
MONOCYTES RELATIVE PERCENT: 10.7 %
NEUTROPHILS ABSOLUTE: 6.6 K/UL (ref 1.7–7.7)
NEUTROPHILS RELATIVE PERCENT: 77 %
PDW BLD-RTO: 16.2 % (ref 12.4–15.4)
PERFORMED ON: ABNORMAL
PLATELET # BLD: 297 K/UL (ref 135–450)
PMV BLD AUTO: 7.3 FL (ref 5–10.5)
POTASSIUM REFLEX MAGNESIUM: 4 MMOL/L (ref 3.5–5.1)
RBC # BLD: 3.57 M/UL (ref 4.2–5.9)
SODIUM BLD-SCNC: 137 MMOL/L (ref 136–145)
WBC # BLD: 8.6 K/UL (ref 4–11)

## 2022-08-21 PROCEDURE — 2700000000 HC OXYGEN THERAPY PER DAY

## 2022-08-21 PROCEDURE — 36415 COLL VENOUS BLD VENIPUNCTURE: CPT

## 2022-08-21 PROCEDURE — 1200000000 HC SEMI PRIVATE

## 2022-08-21 PROCEDURE — 6370000000 HC RX 637 (ALT 250 FOR IP): Performed by: INTERNAL MEDICINE

## 2022-08-21 PROCEDURE — 94761 N-INVAS EAR/PLS OXIMETRY MLT: CPT

## 2022-08-21 PROCEDURE — 94640 AIRWAY INHALATION TREATMENT: CPT

## 2022-08-21 PROCEDURE — 85025 COMPLETE CBC W/AUTO DIFF WBC: CPT

## 2022-08-21 PROCEDURE — 2580000003 HC RX 258: Performed by: INTERNAL MEDICINE

## 2022-08-21 PROCEDURE — 6360000002 HC RX W HCPCS: Performed by: INTERNAL MEDICINE

## 2022-08-21 PROCEDURE — 80048 BASIC METABOLIC PNL TOTAL CA: CPT

## 2022-08-21 RX ORDER — HYDRALAZINE HYDROCHLORIDE 20 MG/ML
10 INJECTION INTRAMUSCULAR; INTRAVENOUS EVERY 6 HOURS PRN
Status: DISCONTINUED | OUTPATIENT
Start: 2022-08-21 | End: 2022-08-23 | Stop reason: HOSPADM

## 2022-08-21 RX ORDER — VALSARTAN 80 MG/1
40 TABLET ORAL DAILY
Status: DISCONTINUED | OUTPATIENT
Start: 2022-08-21 | End: 2022-08-23 | Stop reason: HOSPADM

## 2022-08-21 RX ADMIN — SODIUM CHLORIDE, PRESERVATIVE FREE 10 ML: 5 INJECTION INTRAVENOUS at 20:33

## 2022-08-21 RX ADMIN — LATANOPROST 1 DROP: 50 SOLUTION OPHTHALMIC at 09:07

## 2022-08-21 RX ADMIN — INSULIN LISPRO 1 UNITS: 100 INJECTION, SOLUTION INTRAVENOUS; SUBCUTANEOUS at 18:17

## 2022-08-21 RX ADMIN — BRIMONIDINE TARTRATE 1 DROP: 2 SOLUTION OPHTHALMIC at 09:07

## 2022-08-21 RX ADMIN — SODIUM CHLORIDE, PRESERVATIVE FREE 10 ML: 5 INJECTION INTRAVENOUS at 09:07

## 2022-08-21 RX ADMIN — LEVOTHYROXINE SODIUM 75 MCG: 0.07 TABLET ORAL at 06:30

## 2022-08-21 RX ADMIN — CARVEDILOL 3.12 MG: 3.12 TABLET, FILM COATED ORAL at 09:06

## 2022-08-21 RX ADMIN — BRIMONIDINE TARTRATE 1 DROP: 2 SOLUTION OPHTHALMIC at 20:33

## 2022-08-21 RX ADMIN — VALSARTAN 40 MG: 80 TABLET, FILM COATED ORAL at 10:58

## 2022-08-21 RX ADMIN — ACETAMINOPHEN 650 MG: 325 TABLET, FILM COATED ORAL at 12:36

## 2022-08-21 RX ADMIN — BUDESONIDE 250 MCG: 0.25 SUSPENSION RESPIRATORY (INHALATION) at 20:18

## 2022-08-21 RX ADMIN — TIOTROPIUM BROMIDE AND OLODATEROL 2 PUFF: 3.124; 2.736 SPRAY, METERED RESPIRATORY (INHALATION) at 08:01

## 2022-08-21 RX ADMIN — INSULIN LISPRO 1 UNITS: 100 INJECTION, SOLUTION INTRAVENOUS; SUBCUTANEOUS at 12:39

## 2022-08-21 RX ADMIN — TIMOLOL MALEATE 1 DROP: 5 SOLUTION OPHTHALMIC at 20:33

## 2022-08-21 RX ADMIN — BUDESONIDE 250 MCG: 0.25 SUSPENSION RESPIRATORY (INHALATION) at 08:01

## 2022-08-21 RX ADMIN — TIMOLOL MALEATE 1 DROP: 5 SOLUTION OPHTHALMIC at 09:07

## 2022-08-21 ASSESSMENT — PAIN SCALES - GENERAL
PAINLEVEL_OUTOF10: 0
PAINLEVEL_OUTOF10: 1
PAINLEVEL_OUTOF10: 0

## 2022-08-21 ASSESSMENT — PAIN DESCRIPTION - PAIN TYPE: TYPE: ACUTE PAIN

## 2022-08-21 ASSESSMENT — PAIN DESCRIPTION - ORIENTATION: ORIENTATION: ANTERIOR

## 2022-08-21 ASSESSMENT — PAIN DESCRIPTION - FREQUENCY: FREQUENCY: INTERMITTENT

## 2022-08-21 ASSESSMENT — PAIN DESCRIPTION - LOCATION: LOCATION: HEAD

## 2022-08-21 ASSESSMENT — PAIN DESCRIPTION - DESCRIPTORS: DESCRIPTORS: ACHING

## 2022-08-21 ASSESSMENT — PAIN DESCRIPTION - ONSET: ONSET: ON-GOING

## 2022-08-21 NOTE — PROGRESS NOTES
Contacted Dr Vera, updated about the patient's condition. Patient reports headache while sitting on the chair, relieved by lying down. neurological status intact . Morning carvedilol given. BP is 183/64.

## 2022-08-21 NOTE — PLAN OF CARE
Problem: Discharge Planning  Goal: Discharge to home or other facility with appropriate resources  Outcome: Progressing  Flowsheets (Taken 8/21/2022 1701)  Discharge to home or other facility with appropriate resources:   Identify barriers to discharge with patient and caregiver   Arrange for needed discharge resources and transportation as appropriate   Identify discharge learning needs (meds, wound care, etc)   Arrange for interpreters to assist at discharge as needed   Refer to discharge planning if patient needs post-hospital services based on physician order or complex needs related to functional status, cognitive ability or social support system     Problem: Safety - Adult  Goal: Free from fall injury  Outcome: Progressing  Flowsheets (Taken 8/21/2022 1701)  Free From Fall Injury:   Instruct family/caregiver on patient safety   Based on caregiver fall risk screen, instruct family/caregiver to ask for assistance with transferring infant if caregiver noted to have fall risk factors     Problem: Skin/Tissue Integrity  Goal: Absence of new skin breakdown  Description: 1. Monitor for areas of redness and/or skin breakdown  2. Assess vascular access sites hourly  3. Every 4-6 hours minimum:  Change oxygen saturation probe site  4. Every 4-6 hours:  If on nasal continuous positive airway pressure, respiratory therapy assess nares and determine need for appliance change or resting period.   Outcome: Progressing

## 2022-08-21 NOTE — PROGRESS NOTES
DIAG   Final Result   1. Uneventful diagnostic fluoroscopic guided lumbar puncture   2. Opening pressure of 16 cm water with xanthochromic CSF fluid noted      MRI CERVICAL SPINE WO CONTRAST   Final Result   1. Advanced cervical spondylosis with associated disc herniations resulting in varying degrees of central canal and foraminal stenosis as detailed above. MRI BRAIN WO CONTRAST   Final Result   1. Small amount of hemorrhage layering in the occipital horns of the bilateral lateral ventricles. FLAIR hyperintensity also noted in the bilateral subarachnoid space, compatible with some subarachnoid hemorrhage, within the mid and posterior/dependent    brain, involving bilateral frontal, parietal, occipital and temporal regions. 2. Susceptibility artifact along the lateral aspect of the left lateral ventricle body, most compatible with subependymal parenchymal hemorrhage, similar to prior CT. 3. Diffusion restriction compatible with recent small ischemic infarct along the medial right frontal cortex. 4. Moderate to severe diffuse cerebral atrophy with superimposed moderate to severe small vessel ischemic disease. MRI THORACIC SPINE W WO CONTRAST   Final Result      Unremarkable thoracic spine MRI. MRI LUMBAR SPINE W WO CONTRAST   Final Result      Multilevel discogenic/spondylotic changes with multilevel canal and foraminal narrowing as above. Abdominal aortic aneurysm. No abnormal enhancement following contrast administration. CT CERVICAL SPINE WO CONTRAST   Final Result      No acute fracture identified. CT LUMBAR SPINE WO CONTRAST   Final Result      Spondylotic changes as above. No acute fracture seen. Abdominal aortic aneurysm measuring up to 3.2 cm. CT THORACIC SPINE WO CONTRAST   Final Result      Thoracic scoliosis. No acute fracture seen. XR CHEST PORTABLE   Final Result      Mild left basilar atelectasis or infiltrate.          CT HEAD WO CONTRAST   Final Result      Persistent intraventricular hemorrhage with small amount of suspected parenchymal bleed along the lateral aspect of the posterior horn of the left lateral ventricle. The extent of the hemorrhage has decreased in comparison the prior study but interval    rebleed is a potential consideration. Results called to Dr. Colin Valladares at 7:15 PM on 8/17/2022. 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 Patient on 2 L chronically.  -Continue levothyroxine. TSH 1.27 on 8/19/2022.  -Restart home valsartan and hydralazine IV as needed. DVT Prophylaxis: None given intracranial hemorrhage  Diet: ADULT DIET; Regular; 3 carb choices (45 gm/meal); Low Fat/Low Chol/High Fiber/2 gm Na  Code Status: Full Code    PT/OT Eval Status:  Following    Dispo -SNF pending    Lalitha Enrique MD

## 2022-08-22 LAB
GLUCOSE BLD-MCNC: 139 MG/DL (ref 70–99)
GLUCOSE BLD-MCNC: 195 MG/DL (ref 70–99)
GLUCOSE BLD-MCNC: 233 MG/DL (ref 70–99)
PERFORMED ON: ABNORMAL

## 2022-08-22 PROCEDURE — 1200000000 HC SEMI PRIVATE

## 2022-08-22 PROCEDURE — 97535 SELF CARE MNGMENT TRAINING: CPT

## 2022-08-22 PROCEDURE — 97530 THERAPEUTIC ACTIVITIES: CPT

## 2022-08-22 PROCEDURE — 2700000000 HC OXYGEN THERAPY PER DAY

## 2022-08-22 PROCEDURE — 94761 N-INVAS EAR/PLS OXIMETRY MLT: CPT

## 2022-08-22 PROCEDURE — 6360000002 HC RX W HCPCS: Performed by: INTERNAL MEDICINE

## 2022-08-22 PROCEDURE — 99291 CRITICAL CARE FIRST HOUR: CPT | Performed by: NURSE PRACTITIONER

## 2022-08-22 PROCEDURE — 6370000000 HC RX 637 (ALT 250 FOR IP): Performed by: INTERNAL MEDICINE

## 2022-08-22 PROCEDURE — 97116 GAIT TRAINING THERAPY: CPT

## 2022-08-22 PROCEDURE — 2580000003 HC RX 258: Performed by: INTERNAL MEDICINE

## 2022-08-22 PROCEDURE — 94640 AIRWAY INHALATION TREATMENT: CPT

## 2022-08-22 RX ADMIN — TIMOLOL MALEATE 1 DROP: 5 SOLUTION OPHTHALMIC at 21:10

## 2022-08-22 RX ADMIN — SODIUM CHLORIDE, PRESERVATIVE FREE 10 ML: 5 INJECTION INTRAVENOUS at 11:16

## 2022-08-22 RX ADMIN — LEVOTHYROXINE SODIUM 75 MCG: 0.07 TABLET ORAL at 06:11

## 2022-08-22 RX ADMIN — BRIMONIDINE TARTRATE 1 DROP: 2 SOLUTION OPHTHALMIC at 11:15

## 2022-08-22 RX ADMIN — VALSARTAN 40 MG: 80 TABLET, FILM COATED ORAL at 08:53

## 2022-08-22 RX ADMIN — TIOTROPIUM BROMIDE AND OLODATEROL 2 PUFF: 3.124; 2.736 SPRAY, METERED RESPIRATORY (INHALATION) at 10:16

## 2022-08-22 RX ADMIN — SODIUM CHLORIDE, PRESERVATIVE FREE 10 ML: 5 INJECTION INTRAVENOUS at 21:09

## 2022-08-22 RX ADMIN — LATANOPROST 1 DROP: 50 SOLUTION OPHTHALMIC at 11:14

## 2022-08-22 RX ADMIN — BUDESONIDE 250 MCG: 0.25 SUSPENSION RESPIRATORY (INHALATION) at 10:15

## 2022-08-22 RX ADMIN — ACETAMINOPHEN 650 MG: 325 TABLET, FILM COATED ORAL at 21:08

## 2022-08-22 RX ADMIN — BRIMONIDINE TARTRATE 1 DROP: 2 SOLUTION OPHTHALMIC at 21:10

## 2022-08-22 RX ADMIN — BUDESONIDE 250 MCG: 0.25 SUSPENSION RESPIRATORY (INHALATION) at 20:09

## 2022-08-22 RX ADMIN — TIMOLOL MALEATE 1 DROP: 5 SOLUTION OPHTHALMIC at 11:15

## 2022-08-22 RX ADMIN — ACETAMINOPHEN 650 MG: 325 TABLET, FILM COATED ORAL at 06:52

## 2022-08-22 ASSESSMENT — PAIN SCALES - GENERAL
PAINLEVEL_OUTOF10: 0
PAINLEVEL_OUTOF10: 3
PAINLEVEL_OUTOF10: 0
PAINLEVEL_OUTOF10: 3
PAINLEVEL_OUTOF10: 3
PAINLEVEL_OUTOF10: 0

## 2022-08-22 ASSESSMENT — PAIN DESCRIPTION - PAIN TYPE
TYPE: ACUTE PAIN
TYPE: ACUTE PAIN

## 2022-08-22 ASSESSMENT — PAIN DESCRIPTION - LOCATION
LOCATION: HEAD
LOCATION: HEAD

## 2022-08-22 ASSESSMENT — PAIN DESCRIPTION - DESCRIPTORS
DESCRIPTORS: ACHING
DESCRIPTORS: ACHING

## 2022-08-22 ASSESSMENT — PAIN DESCRIPTION - ORIENTATION
ORIENTATION: MID
ORIENTATION: MID

## 2022-08-22 ASSESSMENT — PAIN - FUNCTIONAL ASSESSMENT
PAIN_FUNCTIONAL_ASSESSMENT: ACTIVITIES ARE NOT PREVENTED
PAIN_FUNCTIONAL_ASSESSMENT: ACTIVITIES ARE NOT PREVENTED

## 2022-08-22 ASSESSMENT — PAIN DESCRIPTION - FREQUENCY
FREQUENCY: CONTINUOUS
FREQUENCY: CONTINUOUS

## 2022-08-22 ASSESSMENT — PAIN DESCRIPTION - ONSET
ONSET: PROGRESSIVE
ONSET: ON-GOING

## 2022-08-22 NOTE — PLAN OF CARE
Problem: Pain  Goal: Verbalizes/displays adequate comfort level or baseline comfort level  Outcome: Progressing  Flowsheets (Taken 8/22/2022 0308)  Verbalizes/displays adequate comfort level or baseline comfort level:   Encourage patient to monitor pain and request assistance   Administer analgesics based on type and severity of pain and evaluate response   Assess pain using appropriate pain scale   Implement non-pharmacological measures as appropriate and evaluate response   Notify Licensed Independent Practitioner if interventions unsuccessful or patient reports new pain  Note: Pt has denied pain during shift. Will continue to monitor pt's pain level. Problem: Safety - Adult  Goal: Free from fall injury  8/22/2022 0308 by Tenisha Ramos  Outcome: Progressing  Flowsheets (Taken 8/21/2022 2054)  Free From Fall Injury: Instruct family/caregiver on patient safety  Note: Pt will remain free from accidental injury this shift. Pt has fall risk measures (fall risk bracelet, fall risk sign, fall risk cloth, non-slip socks, dome light on) in place. Pt bed is in low position, bed alarm on, bed wheels locked, call light within reach, bedside table within reach, chair wheels locked, chair alarm on. Problem: ABCDS Injury Assessment  Goal: Absence of physical injury  Outcome: Progressing  Flowsheets (Taken 8/21/2022 2054)  Absence of Physical Injury: Implement safety measures based on patient assessment     Problem: Skin/Tissue Integrity  Goal: Absence of new skin breakdown  Description: 1. Monitor for areas of redness and/or skin breakdown  2. Assess vascular access sites hourly  3. Every 4-6 hours minimum:  Change oxygen saturation probe site  4. Every 4-6 hours:  If on nasal continuous positive airway pressure, respiratory therapy assess nares and determine need for appliance change or resting period. 8/22/2022 0308 by Tenisha Ramos  Outcome: Progressing  Note: Pt shows no new skin breakdown.      Problem: Neurosensory - Adult  Goal: Achieves stable or improved neurological status  Outcome: Progressing  Flowsheets (Taken 8/22/2022 0308)  Achieves stable or improved neurological status:   Assess for and report changes in neurological status   Maintain blood pressure and fluid volume within ordered parameters to optimize cerebral perfusion and minimize risk of hemorrhage   Monitor temperature, glucose, and sodium. Initiate appropriate interventions as ordered  Note: Pt is alert and oriented x4 and follows commands appropriately. Neuro checks are being performed q4h. Problem: Respiratory - Adult  Goal: Achieves optimal ventilation and oxygenation  Outcome: Progressing  Flowsheets (Taken 8/22/2022 0308)  Achieves optimal ventilation and oxygenation:   Assess for changes in respiratory status   Position to facilitate oxygenation and minimize respiratory effort   Respiratory therapy support as indicated   Oxygen supplementation based on oxygen saturation or arterial blood gases   Encourage broncho-pulmonary hygiene including cough, deep breathe, incentive spirometry   Assess and instruct to report shortness of breath or any respiratory difficulty   Assess for changes in mentation and behavior   Assess the need for suctioning and aspirate as needed  Note: Pt is currently on 2L NC and SpO2 has been WNL. Problem: Cardiovascular - Adult  Goal: Maintains optimal cardiac output and hemodynamic stability  Outcome: Progressing  Flowsheets (Taken 8/22/2022 0308)  Maintains optimal cardiac output and hemodynamic stability:   Monitor blood pressure and heart rate   Monitor urine output and notify Licensed Independent Practitioner for values outside of normal range   Assess for signs of decreased cardiac output  Note: Pt is on continuous telemetry and heart rhythm is NSR/SB.      Problem: Metabolic/Fluid and Electrolytes - Adult  Goal: Glucose maintained within prescribed range  Outcome: Progressing  Flowsheets (Taken 8/22/2022 0308)  Glucose maintained within prescribed range:   Monitor blood glucose as ordered   Administer ordered medications to maintain glucose within target range   Assess for signs and symptoms of hyperglycemia and hypoglycemia  Note: Pt's blood sugar is being checked ACHS and being covered per protocol.

## 2022-08-22 NOTE — PROGRESS NOTES
[address]  CARDIOLOGY  FOLLOW UP NOTE        Reason for Consultation/Chief Complaint: frequent falls and hypersomnolence with slow HR       History of Present Illness:  Lucy Cruz is a 82 y.o.  is a very pleasant 82 y.o. male with complaints of frequent falls and hypersomnolence with slow HR. Pt was discharged from [facility] after an admit for [address] s/p fall at home on Monday 8/15/22. Pt was seen by neurosurgery and pt needed no surgical intervention as the repeat head CT was stable and subsequently eliquis was discontinued. . Pt dc'd home with f/u with neurosurgery. Pt son related to Dr. Farooq Thakkar that pt had a slow HR at [address] and is concerned that this is contributing to the patient's weakness and falls     Per family pt has fallen several times since dc Monday, has been sleeping most of the time since he left the hospital. Was seen in office for f/u yesterday by Dr. Idris Kaminski, who recommended he come to the ER for further assessment. Past Medical History:   has a past medical history of Atrial fibrillation ([facility] [address] CHF (congestive heart failure) ([facility] [address] Hypertension, and Thyroid disease. Surgical History:   has a past surgical history that includes Carpal tunnel release (Bilateral); Lumbar spine surgery; and Cystoscopy. Social History:   reports that he has quit smoking. He has quit using smokeless tobacco. He reports that he does not currently use alcohol. Family History:  No evidence for sudden cardiac death or premature CAD    Home Medications:  Were reviewed and are listed in nursing record. and/or listed below  Prior to Admission medications    Medication Sig Start Date End Date Taking? Authorizing Provider   Probiotic Product (PROBIOTIC ADVANCED PO) Take 1 caplet by mouth daily    Historical Provider, MD   furosemide (LASIX) 40 MG tablet Take 40 mg by mouth in the morning. Historical Provider, MD   dilTIAZem (CARDIZEM) 30 MG tablet Take 30 mg by mouth in the morning.  PRN HR over 100 for more than 10 min. Historical Provider, MD   potassium chloride (MICRO-K) 10 MEQ extended release capsule Take 10 mEq by mouth in the morning. Historical Provider, MD   cefUROXime (CEFTIN) 500 MG tablet Take 1 tablet by mouth in the morning and 1 tablet before bedtime. Do all this for 10 days. 8/15/22 8/25/22  Karey Gloria MD   carvedilol (COREG) 3.125 MG tablet Take 3.125 mg by mouth in the morning and 3.125 mg in the evening. Take with meals. Historical Provider, MD   Cholecalciferol (VITAMIN D3) 50 MCG ([address]) CAPS Take by mouth 2 times daily    Historical Provider, MD   valsartan (DIOVAN) 40 MG tablet Take 40 mg by mouth in the morning.     Historical Provider, MD   fluticasone-umeclidin-vilant (Kirill Heckle) 100-62.5-25 MCG/INH AEPB Inhale 1 puff into the lungs daily    Historical Provider, MD   levothyroxine (SYNTHROID) 75 MCG tablet Take 75 mcg by mouth Daily    Historical Provider, MD   JANUVIA 50 MG tablet Take 50 mg by mouth daily  3/29/21   Historical Provider, MD   COMBIGAN 0.2-0.5 % ophthalmic solution Place 1 drop into both eyes every 12 hours  5/1/21   Historical Provider, MD   latanoprost (XALATAN) 0.005 % ophthalmic solution Place 1 drop into both eyes daily  5/21/20   Historical Provider, MD   Multiple Vitamin (MULTIVITAMIN) tablet Take 1 tablet by mouth daily    Historical Provider, MD        Hospital Medications:   valsartan  40 mg Oral Daily    carvedilol  3.125 mg Oral BID WC    latanoprost  1 drop Both Eyes Daily    levothyroxine  75 mcg Oral QAM AC    sodium chloride flush  5-40 mL IntraVENous 2 times per day    insulin lispro  0-4 Units SubCUTAneous TID WC    insulin lispro  0-4 Units SubCUTAneous Nightly    brimonidine  1 drop Both Eyes BID    And    timolol  1 drop Both Eyes BID    budesonide  0.25 mg Nebulization BID    And    tiotropium-olodaterol  2 puff Inhalation Daily     hydrALAZINE, perflutren lipid microspheres, glucose, dextrose bolus **OR** dextrose bolus, glucagon (rDNA), dextrose, sodium chloride flush, sodium chloride, ondansetron **OR** ondansetron, polyethylene glycol, acetaminophen **OR** acetaminophen   dextrose      sodium chloride Stopped (08/18/22 1614)       Allergies:  Naproxen     Review of Systems:     A 14 ROS obtained and negative except as mentioned in HPI. Constitutional: there has been no unanticipated weight loss. Eyes: No visual changes or diplopia. No scleral icterus. ENT: No Headaches, hearing loss or vertigo. No mouth sores or sore throat. Cardiovascular: No loss of consciousness. No hemoptysis, pleuritic pain, or phlebitis. Respiratory: No cough or wheezing, no sputum production. No hematemesis. Gastrointestinal: No abdominal pain, appetite loss, blood in stools. No change in bowel or bladder habits. Genitourinary: No dysuria, or hematuria. Musculoskeletal:  No gait disturbance, weakness or joint complaints. Integumentary: No rash or pruritis. Neurological: No headache, diplopia,numbness or tingling. No change in gait, balance, coordination, mood, affect, memory, mentation, behavior. Psychiatric: No anxiety,  Endocrine: No malaise,  Hematologic/Lymphatic: No abnormal bruising  Allergic/Immunologic: No nasal congestion      Physical Examination:    Vitals:    08/22/22 1650   BP: (!) 148/74   Pulse: 68   Resp: 16   Temp: 98 °F (36.7 °C)   SpO2:     Weight: 118 lb 9.7 oz (53.8 kg)         General Appearance:  Alert, cooperative, no distress, appears stated age   Head:  Normocephalic, without obvious abnormality   Eyes:  PERRL   Nose: Nares normal,   Neck: Supple, JVP normal    Lungs:   Clear to auscultation bilaterally, respirations unlabored   Chest Wall:  No tenderness or deformity   Heart:  Regular rate and rhythm, normal S1, S2 normal, no murmur,  No rub.  No S3 gallop   Abdomen:   Soft, non-tender, +bowel sounds   Extremities: no cyanosis, no clubbing , No edema   Pulses: Symmetric extremities   Skin: no gross lesions or rashes Pysch: Normal mood and affect  pt is alert and pleasant and conversant   Neurologic: No gross deficits. CN II - XII grossly intact        Labs  CBC:   Lab Results   Component Value Date/Time    WBC 8.6 08/21/2022 10:27 AM    RBC 3.57 08/21/2022 10:27 AM    HGB 10.2 08/21/2022 10:27 AM    HCT 31.6 08/21/2022 10:27 AM    MCV 88.4 08/21/2022 10:27 AM    RDW 16.2 08/21/2022 10:27 AM     08/21/2022 10:27 AM     CMP:    Lab Results   Component Value Date/Time     08/21/2022 10:27 AM    K 4.0 08/21/2022 10:27 AM     08/21/2022 10:27 AM    CO2 25 08/21/2022 10:27 AM    BUN 31 08/21/2022 10:27 AM    CREATININE 1.3 08/21/2022 10:27 AM    GFRAA >60 08/21/2022 10:27 AM    AGRATIO 0.9 08/13/2022 08:52 AM    LABGLOM 52 08/21/2022 10:27 AM    GLUCOSE 231 08/21/2022 10:27 AM    PROT 7.4 08/13/2022 08:52 AM    CALCIUM 8.7 08/21/2022 10:27 AM    BILITOT <0.2 08/13/2022 08:52 AM    ALKPHOS 82 08/13/2022 08:52 AM    AST 23 08/13/2022 08:52 AM    ALT 14 08/13/2022 08:52 AM     PT/INR:  No results found for: PTINR  No results for input(s): CKTOTAL, CKMB, TROPONINI in the last 72 hours. EKG:  SR with PACs. [id number] Score for Atrial Fibrillation Stroke Risk   Risk   Factors  Component Value   C CHF No 0   H HTN Yes 1   A2 Age >= 76 Yes,  (82 y.o.) 2   D DM Yes 1   S2 Prior Stroke/TIA No 0   V Vascular Disease No 0   A Age 74-69 No,  (82 y.o.) 0   Sc Sex male 0    [id number]  Score  4   Score last updated 8/19/22 7:30 PM EDT    Click here for a link to the UpToDate guideline \"Atrial Fibrillation: Anticoagulation therapy to prevent embolization    Disclaimer: Risk Score calculation is dependent on accuracy of patient problem list and past encounter diagnosis.       Assessment  Patient Active Problem List   Diagnosis    Pneumonia due to COVID-19 virus    Type 2 diabetes mellitus without complication (HCC)    COPD (chronic obstructive pulmonary disease) ([facility] [address]    Acquired hypothyroidism    Acute respiratory failure with hypoxia (HCC)    Brain bleed (HCC)    Acute intracranial hemorrhage (HCC)    Intracranial hemorrhage (HCC)    Intracranial hemangioma (HCC)    Bradycardia    Paroxysmal atrial fibrillation ([address]    Hypertension          A/P    AFib:  paced and in regular rhythm on interrogation. No further cardiac workup needed. Pt is comfortable and ok for discharge to SNF. Pt Cannot receive AC for A Fib due to recent ICH. Will sign off at this time. Pt follows with [facility] for Cardiac care.

## 2022-08-22 NOTE — PROGRESS NOTES
Hospitalist Progress Note      PCP: Rocyk Del Valle MD    Date of Admission: 8/17/2022    Chief Complaint: Weakness and fall    Hospital Course: This is a very pleasant 82 y.o. male with a history of multiple medical problems presenting with weakness and falls. Pt was discharged from [facility] Monday after an admit for ICH s/p fall at home. Was admitted, seen by neurosurgery, no surgical intervention needed. Repeat head CT stable, eliquis stopped. Pt dc'd home with f/u with neurosurgery. Per family pt has fallen several times since dc Monday, has been sleeping most of the time since he left the hospital. Was seen in office for f/u yesterday by Dr. Smita Baez, who recommended he come to the ER for further assessment. Repeat head CT as noted below, questionable change, not clear at this time. Orthostatic negative. MRI brain performed with no evidence of acute events. Cervical MRI with advanced cervical spondylosis with associated disc herniations resulting in varying degrees of central canal and foraminal stenosis. Subjective:   Resting comfortably, no complaints at this time. Overall BP control has improved.      Medications:  Reviewed    Infusion Medications    dextrose      sodium chloride Stopped (08/18/22 1614)     Scheduled Medications    valsartan  40 mg Oral Daily    carvedilol  3.125 mg Oral BID WC    latanoprost  1 drop Both Eyes Daily    levothyroxine  75 mcg Oral QAM AC    sodium chloride flush  5-40 mL IntraVENous 2 times per day    insulin lispro  0-4 Units SubCUTAneous TID WC    insulin lispro  0-4 Units SubCUTAneous Nightly    brimonidine  1 drop Both Eyes BID    And    timolol  1 drop Both Eyes BID    budesonide  0.25 mg Nebulization BID    And    tiotropium-olodaterol  2 puff Inhalation Daily     PRN Meds: hydrALAZINE, perflutren lipid microspheres, glucose, dextrose bolus **OR** dextrose bolus, glucagon (rDNA), dextrose, sodium chloride flush, sodium chloride, ondansetron **OR** ondansetron, polyethylene glycol, acetaminophen **OR** acetaminophen      Intake/Output Summary (Last 24 hours) at 8/22/2022 1301  Last data filed at 8/22/2022 0855  Gross per 24 hour   Intake 600 ml   Output 625 ml   Net -25 ml         Physical Exam Performed:    BP (!) 142/66   Pulse 68   Temp 97.7 °F (36.5 °C) (Oral)   Resp 16   Ht 5' 4\" (1.626 m)   Wt 118 lb 9.7 oz (53.8 kg)   SpO2 97%   BMI 20.36 kg/m²     General appearance: No apparent distress, appears stated age and cooperative. HEENT: Pupils equal, round, and reactive to light. Conjunctivae/corneas clear. Neck: Supple, with full range of motion. No jugular venous distention. Trachea midline. Respiratory:  Normal respiratory effort. Clear to auscultation, bilaterally without Rales/Wheezes/Rhonchi. Cardiovascular: Regular rate and rhythm with normal S1/S2 without murmurs, rubs or gallops. Abdomen: Soft, non-tender, non-distended with normal bowel sounds. Musculoskeletal: No clubbing, cyanosis or edema bilaterally. Full range of motion without deformity. Skin: Skin color, texture, turgor normal.  No rashes or lesions. Neurologic: Nonfocal  Psychiatric: Awake and alert thought content appropriate, normal insight      Labs:   Recent Labs     08/21/22  1027   WBC 8.6   HGB 10.2*   HCT 31.6*          Recent Labs     08/21/22  1027      K 4.0      CO2 25   BUN 31*   CREATININE 1.3   CALCIUM 8.7       No results for input(s): AST, ALT, BILIDIR, BILITOT, ALKPHOS in the last 72 hours. No results for input(s): INR in the last 72 hours. No results for input(s): [place] Ill in the last 72 hours.     Urinalysis:      Lab Results   Component Value Date/Time    NITRU Negative 08/17/2022 06:19 PM    WBCUA 0-2 08/17/2022 06:19 PM    RBCUA None seen 08/17/2022 06:19 PM    BLOODU Negative 08/17/2022 06:19 PM    SPECGRAV 1.010 08/17/2022 06:19 PM    GLUCOSEU Negative 08/17/2022 06:19 PM       Radiology:  FL LUMBAR PUNCTURE DIAG Final Result   1. Uneventful diagnostic fluoroscopic guided lumbar puncture   2. Opening pressure of 16 cm water with xanthochromic CSF fluid noted      MRI CERVICAL SPINE WO CONTRAST   Final Result   1. Advanced cervical spondylosis with associated disc herniations resulting in varying degrees of central canal and foraminal stenosis as detailed above. MRI BRAIN WO CONTRAST   Final Result   1. Small amount of hemorrhage layering in the occipital horns of the bilateral lateral ventricles. FLAIR hyperintensity also noted in the bilateral subarachnoid space, compatible with some subarachnoid hemorrhage, within the mid and posterior/dependent    brain, involving bilateral frontal, parietal, occipital and temporal regions. 2. Susceptibility artifact along the lateral aspect of the left lateral ventricle body, most compatible with subependymal parenchymal hemorrhage, similar to prior CT. 3. Diffusion restriction compatible with recent small ischemic infarct along the medial right frontal cortex. 4. Moderate to severe diffuse cerebral atrophy with superimposed moderate to severe small vessel ischemic disease. MRI THORACIC SPINE W WO CONTRAST   Final Result      Unremarkable thoracic spine MRI. MRI LUMBAR SPINE W WO CONTRAST   Final Result      Multilevel discogenic/spondylotic changes with multilevel canal and foraminal narrowing as above. Abdominal aortic aneurysm. No abnormal enhancement following contrast administration. CT CERVICAL SPINE WO CONTRAST   Final Result      No acute fracture identified. CT LUMBAR SPINE WO CONTRAST   Final Result      Spondylotic changes as above. No acute fracture seen. Abdominal aortic aneurysm measuring up to 3.2 cm. CT THORACIC SPINE WO CONTRAST   Final Result      Thoracic scoliosis. No acute fracture seen. XR CHEST PORTABLE   Final Result      Mild left basilar atelectasis or infiltrate.          CT HEAD WO CONTRAST   Final Result      Persistent intraventricular hemorrhage with small amount of suspected parenchymal bleed along the lateral aspect of the posterior horn of the left lateral ventricle. The extent of the hemorrhage has decreased in comparison the prior study but interval    rebleed is a potential consideration. Results called to Dr. Olivia Fuentes at 7:15 PM on 8/17/2022. Assessment/Plan:    Active Hospital Problems    Diagnosis     Hypertension [I10]      Priority: Medium    Bradycardia [R00.1]      Priority: Medium    Paroxysmal atrial fibrillation (HCC) [I48.0]      Priority: Medium    Intracranial hemorrhage (HCC) [I62.9]      Priority: Medium    Intracranial hemangioma (HCC) [D18.02]      Priority: Medium    COPD (chronic obstructive pulmonary disease) ([facility] [address] [J44.9]     Acquired hypothyroidism [E03.9]    Echo:   Conclusions      Summary   Limited study. Left ventricular cavity size is normal. Normal left ventricular wall   thickness. Overall left ventricular systolic function appears mildly reduce with an   ejection fraction of 40-45%. There is mild diffuse hypokinesis. Mild to moderate mitral regurgitation is present. Aortic valve appears sclerotic but opens adequately. HTN: improved, cont current meds, can be adjused longer term if needed. ICH: cont to hold eliquis until 8/27 per neurosurgery recs. Afib: currently rate controlled, appreciate cardiology input. No symptomatic bradycardia since admit, lowest HR 57, has been overall running in 60's. May need monitor as outpatient for continued eval for bradycardia. Dispo: precert pending. Likely dc tomorrow. DVT Prophylaxis: None given intracranial hemorrhage  Diet: ADULT DIET; Regular; 3 carb choices (45 gm/meal); Low Fat/Low Chol/High Fiber/2 gm Na  Code Status: Full Code    PT/OT Eval Status:  Following    Dispo -SNF pending    Reanna Galindo MD

## 2022-08-22 NOTE — PROGRESS NOTES
Findings:  CT HEAD WO CONTRAST  Result Date: 8/17/2022  Persistent intraventricular hemorrhage with small amount of suspected parenchymal bleed along the lateral aspect of the posterior horn of the left lateral ventricle. The extent of the hemorrhage has decreased in comparison the prior study. CT CERVICAL SPINE WO CONTRAST  Result Date: 8/17/2022  No acute fracture identified. CT THORACIC SPINE WO CONTRAST  Result Date: 8/17/2022  Thoracic scoliosis. No acute fracture seen. CT LUMBAR SPINE WO CONTRAST  Result Date: 8/17/2022  Spondylotic changes as above. No acute fracture seen. Abdominal aortic aneurysm measuring up to 3.2 cm. MRI THORACIC SPINE W WO CONTRAST  Result Date: 8/17/2022  Unremarkable thoracic spine MRI. MRI LUMBAR SPINE W WO CONTRAST  Result Date: 8/17/2022  Multilevel discogenic/spondylotic changes with multilevel canal and foraminal narrowing as above. Abdominal aortic aneurysm. No abnormal enhancement following contrast administration. MRI CERVICAL SPINE WO CONTRAST  Result Date: 8/18/2022  Advanced cervical spondylosis with associated disc herniations resulting in varying degrees of central canal and foraminal stenosis as detailed above. MRI BRAIN WO CONTRAST  Result Date: 8/18/2022  1. Small amount of hemorrhage layering in the occipital horns of the bilateral lateral ventricles. FLAIR hyperintensity also noted in the bilateral subarachnoid space, compatible with some subarachnoid hemorrhage, within the mid and posterior/dependent brain, involving bilateral frontal, parietal, occipital and temporal regions. 2. Susceptibility artifact along the lateral aspect of the left lateral ventricle body, most compatible with subependymal parenchymal hemorrhage, similar to prior CT. 3. Diffusion restriction compatible with recent small ischemic infarct along the medial right frontal cortex.   4. Moderate to severe diffuse cerebral atrophy with superimposed moderate to severe small vessel ischemic disease. Labs:  Recent Labs     08/21/22  1027   WBC 8.6   HGB 10.2*   HCT 31.6*            Recent Labs     08/21/22  1027      K 4.0      CO2 25   BUN 31*   CREATININE 1.3   GLUCOSE 231*   CALCIUM 8.7         No results for input(s): PROTIME, INR, APTT in the last 72 hours. Patient Active Problem List    Diagnosis Date Noted    Hypertension 08/21/2022    Bradycardia 08/19/2022    Paroxysmal atrial fibrillation ([address] 08/19/2022    Intracranial hemorrhage ([address] 08/18/2022    Intracranial hemangioma ([address] 08/18/2022    Brain bleed ([address] 08/13/2022    Acute intracranial hemorrhage ([address] 08/13/2022    Pneumonia due to COVID-19 virus 09/28/2021    Type 2 diabetes mellitus without complication ([address] 60/87/7408    COPD (chronic obstructive pulmonary disease) ([address] 09/28/2021    Acquired hypothyroidism 09/28/2021    Acute respiratory failure with hypoxia ([address] 09/28/2021     Assessment:  Patient is a 82 y.o. male w/recent falls after discharge. Patient was seen on 8/13/2022 by our service for small ICH w/IVH extension. Patient also experiencing intermittent ataxia, confusion and urinary incontinence over the past month per son, but worse lately. 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  Hemorrhage could be cause of worsening confusion, but would not explain prior episodes of confusion.  - MRI Brain shows moderate to severe diffuse cerebral atrophy with superimposed moderate to severe small vessel ischemic disease, but no definitive source for confusion  - MRI Cervical shows advanced cervical spondylosis with associated disc herniations resulting in varying degrees of central canal and foraminal stenosis  - MRI Thoracic/Lumbar revealed multilevel discogenic/spondylotic changes with multilevel canal and foraminal narrowing, worst at L3/4  - No surgical intervention indicated as he has no myelopathy or abnormal reflexes  - Opening pressure on LP WNL  - CSF Labs WNL and NGTD on cultures  - UA Negative  - Follow up in clinic in 1 month to see if symptoms have improved  PT/OT following, appreciate recs  Advance diet / activity per primary team  Will follow peripherally while inpatient. Please call with any questions or decline in neurological status     DISPO: OK to DC to SNF from neurosurgery standpoint. Dispo timing to be determined by primary team once patient is medically stable for discharge. Patient was discussed and images reviewed with Dr. Shira Giles who agrees with above assessment and plan. Electronically signed by: VALERIO Francis CNP, ASHANTI, 8/22/2022 1:21 PM  [phone]    I spent 30 minutes in the care of this patient.   Over 50% of that time was in face-to-face counseling regarding disease process, diagnostic testing, preventative measures, and answering patient and family questions

## 2022-08-22 NOTE — PROGRESS NOTES
Occupational Therapy  Facility/Department: [facility]  Occupational Therapy Daily Treatment    Name: Uzair Lofton  : 1935  MRN: [id number]  Date of Service: 2022    Discharge Recommendations:  Uzair Lofton scored a 17/24 on the AM-PAC ADL Inpatient form. Current research shows that an AM-PAC score of 17 or less is typically not associated with a discharge to the patient's home setting. Based on the patient's AM-PAC score and their current ADL deficits, it is recommended that the patient have 3-5 sessions per week of Occupational Therapy at d/c to increase the patient's independence. Please see assessment section for further patient specific details. If patient discharges prior to next session this note will serve as a discharge summary. Please see below for the latest assessment towards goals. OT Equipment Recommendations  Equipment Needed: No  Other: defer to next level of care       Patient Diagnosis(es): The primary encounter diagnosis was Intracranial hemorrhage ([facility] Diagnoses of Falls frequently and Abdominal aortic aneurysm (AAA) without rupture ([address] were also pertinent to this visit. Past Medical History:  has a past medical history of Atrial fibrillation ([address] CHF (congestive heart failure) ([address] Hypertension, and Thyroid disease. Past Surgical History:  has a past surgical history that includes Carpal tunnel release (Bilateral); Lumbar spine surgery; and Cystoscopy. Treatment Diagnosis: impaired ADLs and functional mobility/transfers      Assessment   Performance deficits / Impairments: Decreased functional mobility ; Decreased endurance;Decreased ADL status; Decreased balance;Decreased safe awareness;Decreased cognition;Decreased strength;Decreased coordination  Assessment: Pt demo good progress w/ funcitonal mobility this date, ambulating [id number] w/ CGA-Octavio. Pt demo increased difficulty w/ gait w/ fatigue.  Pt also demo improvement w/ LE dressing and toileting this date, completing w/ Octavio. Pt cont to be limited by decreased endurance and activity tolerance. Pt would benefit from cont OT services at [place] to maximize safety and functional independence. Will cont to follow for acute OT  Treatment Diagnosis: impaired ADLs and functional mobility/transfers  Prognosis: Good  REQUIRES OT FOLLOW-UP: Yes  Activity Tolerance  Activity Tolerance: Patient Tolerated treatment well;Patient limited by fatigue        Plan   Plan  Times per Week: 2-5x  Times per Day: Daily  Current Treatment Recommendations: Strengthening, Balance training, Functional mobility training, Endurance training, Patient/Caregiver education & training, Equipment evaluation, education, & procurement, Safety education & training, Self-Care / ADL     Restrictions  Position Activity Restriction  Other position/activity restrictions: Up with assist    Subjective   General  Chart Reviewed: Yes  Patient assessed for rehabilitation services?: Yes  Additional Pertinent Hx: Pt to ED 8/17 with ICH and falls. Pt discharged home from [facility] 8/15 with [address] and continued decline at home, falling 4 times. Head Imgaing: shows hemorrhage has decreased. Spine Imaging: Multilevel discogenic/spondylotic changes. Neurosurgery consult. PMH:  Afib, CHF, HTN, Covid-19, falls, ICH  Family / Caregiver Present: No  Referring Practitioner: Tye Arthur MD  Diagnosis: Intracranial hemorrhage  Subjective  Subjective: Pt supine in bed, sleeping upon arrival. Pt easily awakened, pleasant and agreeable to therapy session. Social/Functional History  Social/Functional History  Lives With: Friend(s) (Son's friend, Hao Raymond, who is 52 and medically released from the [employer].   Unable to assist pt at home.)  Type of Home: House  Home Layout: Two level, Able to Live on Main level with bedroom/bathroom  Home Access: Stairs to enter without rails (1 step)  Bathroom Shower/Tub: Walk-in shower, Shower chair with back  [employer]: Standard  Bathroom Equipment: Grab bars in shower, Shower chair, Grab bars around toilet  Home Equipment: Rollator, Cane, Reacher, Oxygen (2L O2)  Has the patient had two or more falls in the past year or any fall with injury in the past year?: Yes (4 falls since being discharged from [facility] on 8/15.)  ADL Assistance: Needs assistance (has a lady come 2x a week for showers)  Homemaking Assistance: Needs assistance (Friend does homemaking)  Ambulation Assistance: Independent (using rollator)  Transfer Assistance: Independent  Active : No  Occupation: Retired ([address] work)  Leisure & Hobbies: go shooting  Additional Comments: Son reports friend is not capable of assisting patient at home. Objective   Heart Rate: 68  Heart Rate Source: Monitor  BP: (!) 142/66  BP Location: Left upper arm  BP Method: Automatic  Patient Position: Semi fowlers  MAP (Calculated): 91.33  Resp: 16  SpO2: 97 %  O2 Device: Nasal cannula             Safety Devices  Type of Devices: Bed alarm in place; Patient at risk for falls; Left in bed;Nurse notified  Restraints  Restraints Initially in Place: No  Bed Mobility Training  Bed Mobility Training: Yes  Overall Level of Assistance: Stand-by assistance  Supine to Sit: Stand-by assistance  Sit to Supine: Stand-by assistance  Balance  Sitting: Impaired  Sitting - Static: Good (unsupported)  Sitting - Dynamic: Good (unsupported) (slight posterior lean during exercise)  Standing: Impaired  Standing - Static: Occasional (CGA)  Standing - Dynamic: Occasional (CGA)  Transfer Training  Transfer Training: Yes  Overall Level of Assistance: Contact-guard assistance  Sit to Stand: Contact-guard assistance  Stand to Sit: Contact-guard assistance  Toilet Transfer: Contact-guard assistance  Gait Training  Gait Training: Yes  Gait  Overall Level of Assistance: Minimum assistance;Contact-guard assistance (Pt requires MAx VC for RW management and O2 line management.  Pt requries VC to stay inside walker and proper hand placement throughout all transfers.)  Interventions: Manual cues; Safety awareness training; Tactile cues  Base of Support: Widened  Speed/Mandy: Slow  Step Length: Left shortened;Right shortened  Gait Abnormalities: Ataxic (slightly rigid gait pattern with decreased bilateral heel toe gait pattern and decreased bilateral knee flexion.)  Assistive Device: Walker, rolling        ADL  Grooming: Contact guard assistance;Verbal cueing; Increased time to complete  Grooming Skilled Clinical Factors: in stance at sink to complete hand hygiene w/ CGA  LE Dressing: Minimal assistance;Setup; Increased time to complete;Verbal cueing  LE Dressing Skilled Clinical Factors: pt donned hospital pants sitting EOB w/ SBA for sitting balance while threading BLEs. Pt in stance to complete clothing mgmt at hips w// CGA. Pt required CGA for clothing mgmt at hips during 1st toileting trial, however required min A during 2nd d/t increased fatigue  Toileting: Minimal assistance;Setup; Increased time to complete  Toileting Skilled Clinical Factors: Pt voided seated on commode w/ CGA in stance during clothing mgmt at hips. Pt then requesting to toilet again at end of session, pt had BM seated on commode and required setup assist w/ wipes and min A for clothing mgmt at hips     Activity Tolerance  Activity Tolerance: Patient tolerated treatment well           Cognition  Overall Cognitive Status: Exceptions  Arousal/Alertness: Appropriate responses to stimuli  Following Commands: Follows one step commands with increased time; Follows one step commands with repetition  Attention Span: Attends with cues to redirect  Memory: Decreased short term memory  Safety Judgement: Decreased awareness of need for assistance;Decreased awareness of need for safety  Problem Solving: Decreased awareness of errors  Insights: Decreased awareness of deficits  Initiation: Requires cues for some  Sequencing: Requires cues for some               Exercise Treatment: pt sitting EOB to complete the following exercises: 1) BUE: shoulder flex/ext x15- pt noted difficult maintaining upright posture, swinging side to side during exercise, however able to correct w/ VC. 2) BLE: seated marches x 15 & LAQs x 15 (see PT note)  Education Given To: Patient  Education Provided: Role of Therapy;Plan of Care;ADL Adaptive Strategies;Transfer Training;Home Exercise Program  Education Method: Demonstration;Verbal  Barriers to Learning: Cognition; Hearing  Education Outcome: Verbalized understanding;Continued education needed                        AM-PAC Score        AM-[facility] Inpatient Daily Activity Raw Score: 17 (08/22/22 1546)  AM-PAC Inpatient ADL T-Scale Score : 37.26 (08/22/22 1546)  ADL Inpatient CMS 0-100% Score: 50.11 (08/22/22 1546)  ADL Inpatient CMS G-Code Modifier : CK (08/22/22 1546)    Tinneti Score       Goals  Short Term Goals  Time Frame for Short term goals: by dc  Short Term Goal 1: Pt will complete functional transfers, including toilet transfer, w/ SBA - ongoing  Short Term Goal 2: Pt will participate in LE dressing assessment - goal met 8/22; Updated: Pt will complete LE dressing w/ Spvn  Short Term Goal 3: Complete toileting with SBA - ongoing  Short Term Goal 4: Maintain standing balance SBA during ADLs - ongoing       Therapy Time   Individual Concurrent Group Co-treatment   Time In 1355         Time Out 1448         Minutes Nikita OT

## 2022-08-22 NOTE — PROGRESS NOTES
Physical Therapy  Facility/Department: [place] [address] [facility]  Physical Therapy Initial Assessment    Name: Mary Barreto  : 1935  MRN: [id number]  Date of Service: 2022    Discharge Recommendations:  Mary Barreto scored a 17/24 on the AM-PAC short mobility form. Current research shows that an AM-PAC score of 17 or less is typically not associated with a discharge to the patient's home setting. Based on the patient's AM-PAC score and their current functional mobility deficits, it is recommended that the patient have 3-5 sessions per week of Physical Therapy at d/c to increase the patient's independence. Please see assessment section for further patient specific details. If patient discharges prior to next session this note will serve as a discharge summary. Please see below for the latest assessment towards goals. PT Equipment Recommendations  Other: defer to next level of care      Patient Diagnosis(es): The primary encounter diagnosis was Intracranial hemorrhage ([address] Diagnoses of Falls frequently and Abdominal aortic aneurysm (AAA) without rupture ([address] were also pertinent to this visit. Past Medical History:  has a past medical history of Atrial fibrillation ([address] CHF (congestive heart failure) ([address] Hypertension, and Thyroid disease. Past Surgical History:  has a past surgical history that includes Carpal tunnel release (Bilateral); Lumbar spine surgery; and Cystoscopy. Assessment   Body Structures, Functions, Activity Limitations Requiring Skilled Therapeutic Intervention: Decreased functional mobility ; Decreased strength;Decreased balance;Decreased endurance;Decreased safe awareness  Assessment: Pt demonstrates increased functional activity tolerance this date ambulating further distances with decreased assistance. Pt completes toileting x 2. Pt requires Max VC throughout session for proper hand placement and safety of all functional transfers.  VC also to stay inside walker during ambulation. Pt would benefit from continued skilled PT serivces throughout IP stay in addition to therapy upon discharge. Activity Tolerance  Activity Tolerance: Patient tolerated treatment well     Plan   Plan  Plan:  (2-5)  Current Treatment Recommendations: Transfer training, Functional mobility training, Strengthening, Gait training  Safety Devices  Type of Devices: Bed alarm in place, Patient at risk for falls, Left in bed, Nurse notified  Restraints  Restraints Initially in Place: No     Restrictions  Position Activity Restriction  Other position/activity restrictions: Up with assist     Subjective   Subjective  Subjective: Pt supine in bed upon arrival and agreeable to therapy. Pt denies pain this date. Pt notes need to use the restroom x 2 throughout session. Social/Functional History  Social/Functional History  Lives With: Friend(s) (Son's friend, Miguel Porter, who is 52 and medically released from the [employer]. Unable to assist pt at home.)  Type of Home: House  Home Layout: Two level, Able to Live on Main level with bedroom/bathroom  Home Access: Stairs to enter without rails (1 step)  Bathroom Shower/Tub: Walk-in shower, Shower chair with back  Bathroom Toilet: Standard  Bathroom Equipment: Grab bars in shower, Shower chair, Grab bars around toilet  Home Equipment: Rollator, Cane, Reacher, Oxygen (2L O2)  Has the patient had two or more falls in the past year or any fall with injury in the past year?: Yes (4 falls since being discharged from [facility] on 8/[facility].)  ADL Assistance: Needs assistance (has a lady come 2x a week for showers)  Homemaking Assistance: Needs assistance (Friend does homemaking)  Ambulation Assistance: Independent (using rollator)  Transfer Assistance: Independent  Active : No  Occupation: Retired ([address] work)  Leisure & Hobbies: go shooting  Additional Comments: Son reports friend is not capable of assisting patient at home.       Objective   Heart Rate: 68  Heart Rate Source: Monitor  BP: (!) 142/66  BP Location: Left upper arm  BP Method: Automatic  Patient Position: Semi fowlers  MAP (Calculated): 91.33  Resp: 16  SpO2: 97 %  O2 Device: Nasal cannula       Bed Mobility Training  Bed Mobility Training: Yes  Overall Level of Assistance: Stand-by assistance  Supine to Sit: Stand-by assistance  Sit to Supine: Stand-by assistance  Balance  Sitting: Impaired  Sitting - Static: Good (unsupported)  Sitting - Dynamic: Good (unsupported) (slight posterior lean during exercise)  Standing: Impaired  Standing - Static: Occasional (CGA)  Standing - Dynamic: Occasional (CGA)  Transfer Training  Transfer Training: Yes  Overall Level of Assistance: Contact-guard assistance  Sit to Stand: Contact-guard assistance  Stand to Sit: Contact-guard assistance  Toilet Transfer: Contact-guard assistance  Gait Training  Gait Training: Yes  Gait  Overall Level of Assistance: Minimum assistance;Contact-guard assistance (Pt requires MAx VC for RW management and O2 line management. Pt requries VC to stay inside walker and proper hand placement throughout all transfers.)  Interventions: Manual cues; Safety awareness training; Tactile cues  Base of Support: Widened  Speed/Mandy: Slow  Step Length: Left shortened;Right shortened  Gait Abnormalities: Ataxic (slightly rigid gait pattern with decreased bilateral heel toe gait pattern and decreased bilateral knee flexion.)  Assistive Device: Walker, rolling          Exercise Treatment: Pt completed toileting x 2 (see OT note for full details), Seated B LE exercise: Marechaes x 15, LAQs x 15        AM-PAC Score  AM-PAC Inpatient Mobility Raw Score : 17 (08/22/22 1504)  AM-PAC Inpatient T-Scale Score : 42.13 (08/22/22 1504)  Mobility Inpatient CMS 0-100% Score: 50.57 (08/22/22 1504)  Mobility Inpatient CMS G-Code Modifier : CK (08/22/22 1504)         Goals  Short Term Goals  Time Frame for Short term goals: Discharge  Short term goal 1: supine <> sit SBA- goal met UPDATED: Pt will complete bed mobility Ind  Short term goal 2: sit <> stand SBA- ongoing  Short term goal 3: ambulate 150ft with rolling walker SBA- ongoing  Patient Goals   Patient goals : Not stated       Education  Patient Education  Education Given To: Patient  Education Provided: Role of Therapy  Education Method: Verbal  Education Outcome: Demonstrated understanding      Therapy Time   Individual Concurrent Group Co-treatment   Time In 7036         Time Out 1448         Minutes 53         Timed Code Treatment Minutes: [facility], [address] [place] [address], [address] [place] [address]

## 2022-08-23 VITALS
BODY MASS INDEX: 19.8 KG/M2 | HEIGHT: 64 IN | TEMPERATURE: 97.2 F | HEART RATE: 64 BPM | RESPIRATION RATE: 16 BRPM | DIASTOLIC BLOOD PRESSURE: 74 MMHG | SYSTOLIC BLOOD PRESSURE: 139 MMHG | WEIGHT: 115.96 LBS | OXYGEN SATURATION: 95 %

## 2022-08-23 LAB
GLUCOSE BLD-MCNC: 174 MG/DL (ref 70–99)
GLUCOSE BLD-MCNC: 197 MG/DL (ref 70–99)
PERFORMED ON: ABNORMAL
PERFORMED ON: ABNORMAL
SARS-COV-2, NAAT: NOT DETECTED

## 2022-08-23 PROCEDURE — 94640 AIRWAY INHALATION TREATMENT: CPT

## 2022-08-23 PROCEDURE — 97530 THERAPEUTIC ACTIVITIES: CPT

## 2022-08-23 PROCEDURE — 87635 SARS-COV-2 COVID-19 AMP PRB: CPT

## 2022-08-23 PROCEDURE — 6370000000 HC RX 637 (ALT 250 FOR IP): Performed by: INTERNAL MEDICINE

## 2022-08-23 PROCEDURE — 94150 VITAL CAPACITY TEST: CPT

## 2022-08-23 PROCEDURE — 94761 N-INVAS EAR/PLS OXIMETRY MLT: CPT

## 2022-08-23 PROCEDURE — 97116 GAIT TRAINING THERAPY: CPT

## 2022-08-23 PROCEDURE — 6360000002 HC RX W HCPCS: Performed by: INTERNAL MEDICINE

## 2022-08-23 RX ADMIN — CARVEDILOL 3.12 MG: 3.12 TABLET, FILM COATED ORAL at 08:55

## 2022-08-23 RX ADMIN — ACETAMINOPHEN 650 MG: 325 TABLET, FILM COATED ORAL at 04:33

## 2022-08-23 RX ADMIN — ACETAMINOPHEN 650 MG: 325 TABLET, FILM COATED ORAL at 10:38

## 2022-08-23 RX ADMIN — BRIMONIDINE TARTRATE 1 DROP: 2 SOLUTION OPHTHALMIC at 08:56

## 2022-08-23 RX ADMIN — TIOTROPIUM BROMIDE AND OLODATEROL 2 PUFF: 3.124; 2.736 SPRAY, METERED RESPIRATORY (INHALATION) at 09:34

## 2022-08-23 RX ADMIN — VALSARTAN 40 MG: 80 TABLET, FILM COATED ORAL at 08:55

## 2022-08-23 RX ADMIN — BUDESONIDE 250 MCG: 0.25 SUSPENSION RESPIRATORY (INHALATION) at 09:33

## 2022-08-23 RX ADMIN — TIMOLOL MALEATE 1 DROP: 5 SOLUTION OPHTHALMIC at 08:56

## 2022-08-23 RX ADMIN — LATANOPROST 1 DROP: 50 SOLUTION OPHTHALMIC at 08:56

## 2022-08-23 RX ADMIN — LEVOTHYROXINE SODIUM 75 MCG: 0.07 TABLET ORAL at 06:39

## 2022-08-23 ASSESSMENT — PAIN DESCRIPTION - FREQUENCY
FREQUENCY: CONTINUOUS

## 2022-08-23 ASSESSMENT — PAIN - FUNCTIONAL ASSESSMENT
PAIN_FUNCTIONAL_ASSESSMENT: ACTIVITIES ARE NOT PREVENTED

## 2022-08-23 ASSESSMENT — PAIN SCALES - GENERAL
PAINLEVEL_OUTOF10: 3
PAINLEVEL_OUTOF10: 5
PAINLEVEL_OUTOF10: 0
PAINLEVEL_OUTOF10: 0
PAINLEVEL_OUTOF10: 5
PAINLEVEL_OUTOF10: 5
PAINLEVEL_OUTOF10: 0

## 2022-08-23 ASSESSMENT — PAIN DESCRIPTION - ONSET
ONSET: ON-GOING

## 2022-08-23 ASSESSMENT — PAIN DESCRIPTION - ORIENTATION
ORIENTATION: POSTERIOR

## 2022-08-23 ASSESSMENT — PAIN DESCRIPTION - PAIN TYPE
TYPE: ACUTE PAIN

## 2022-08-23 ASSESSMENT — PAIN DESCRIPTION - DESCRIPTORS
DESCRIPTORS: ACHING
DESCRIPTORS: ACHING;DISCOMFORT
DESCRIPTORS: ACHING
DESCRIPTORS: ACHING;DISCOMFORT

## 2022-08-23 ASSESSMENT — PAIN DESCRIPTION - LOCATION
LOCATION: HEAD
LOCATION: HEAD;NECK
LOCATION: HEAD;NECK
LOCATION: HEAD

## 2022-08-23 NOTE — PROGRESS NOTES
Attempted to call report again at this time.  No answer    Electronically signed by Loretta Galvan RN on 8/23/2022 at 2:24 PM

## 2022-08-23 NOTE — PROGRESS NOTES
Pt discharged to [facility].  Pt taken off tele and IV removed    Electronically signed by Aydin Schroeder RN on 8/23/2022 at 2:28 PM

## 2022-08-23 NOTE — DISCHARGE SUMMARY
Hospital Medicine Discharge Summary    Patient ID: Aurther Grade      Patient's PCP: Yovani Robbins MD    Admit Date: 8/17/2022     Discharge Date:   08/23/22 discharge order was placed before. However, discharge got delayed because of insurance approval coming back late. Admitting Provider: Frieda Jacobo MD     Discharge Provider: Avni Sandoval MD     Discharge Diagnoses: Active Hospital Problems    Diagnosis     Hypertension [I10]      Priority: Medium    Bradycardia [R00.1]      Priority: Medium    Paroxysmal atrial fibrillation (HCC) [I48.0]      Priority: Medium    Intracranial hemorrhage (HCC) [I62.9]      Priority: Medium    Intracranial hemangioma (HCC) [D18.02]      Priority: Medium    COPD (chronic obstructive pulmonary disease) ([facility] [address] [J44.9]     Acquired hypothyroidism [E03.9]        The patient was seen and examined on day of discharge and this discharge summary is in conjunction with any daily progress note from day of discharge. Hospital Course:     Patient was readmitted after being kept in the hospital short-term for an intercranial hemorrhage. Came back with worsening weakness evaluated by therapy services and skilled nursing facility was recommended. Imaging did not show worsening hemorrhage. Neurosurgery did not recommend any interventions. Evaluated by cardiology. Anticoagulation was stopped. Being discharged to skilled nursing facility for further therapy. Physical Exam Performed:     /74   Pulse 64   Temp 97.2 °F (36.2 °C) (Oral)   Resp 16   Ht 5' 4\" (1.626 m)   Wt 115 lb 15.4 oz (52.6 kg)   SpO2 95%   BMI 19.90 kg/m²       General appearance:  No apparent distress, appears stated age and cooperative. HEENT:  Normal cephalic, atraumatic without obvious deformity. Pupils equal, round, and reactive to light. Extra ocular muscles intact. Conjunctivae/corneas clear. Neck: Supple, with full range of motion.  No jugular venous distention. Trachea midline. Respiratory:  Normal respiratory effort. Clear to auscultation, bilaterally without Rales/Wheezes/Rhonchi. Cardiovascular:  Regular rate and rhythm with normal S1/S2 without murmurs, rubs or gallops. Abdomen: Soft, non-tender, non-distended with normal bowel sounds. Musculoskeletal:  No clubbing, cyanosis or edema bilaterally. Full range of motion without deformity. Skin: Skin color, texture, turgor normal.  No rashes or lesions. Neurologic:  Neurovascularly intact without any focal sensory/motor deficits. Cranial nerves: II-XII intact, grossly non-focal.  Psychiatric:  Alert and oriented, thought content appropriate, normal insight  Capillary Refill: Brisk,< 3 seconds   Peripheral Pulses: +2 palpable, equal bilaterally       Labs: For convenience and continuity at follow-up the following most recent labs are provided:      CBC:    Lab Results   Component Value Date/Time    WBC 8.6 08/21/2022 10:27 AM    HGB 10.2 08/21/2022 10:27 AM    HCT 31.6 08/21/2022 10:27 AM     08/21/2022 10:27 AM       Renal:    Lab Results   Component Value Date/Time     08/21/2022 10:27 AM    K 4.0 08/21/2022 10:27 AM     08/21/2022 10:27 AM    CO2 25 08/21/2022 10:27 AM    BUN 31 08/21/2022 10:27 AM    CREATININE 1.3 08/21/2022 10:27 AM    CALCIUM 8.7 08/21/2022 10:27 AM         Significant Diagnostic Studies    Radiology:   FL LUMBAR PUNCTURE DIAG   Final Result   1. Uneventful diagnostic fluoroscopic guided lumbar puncture   2. Opening pressure of 16 cm water with xanthochromic CSF fluid noted      MRI CERVICAL SPINE WO CONTRAST   Final Result   1. Advanced cervical spondylosis with associated disc herniations resulting in varying degrees of central canal and foraminal stenosis as detailed above. MRI BRAIN WO CONTRAST   Final Result   1. Small amount of hemorrhage layering in the occipital horns of the bilateral lateral ventricles.  FLAIR hyperintensity also noted in the bilateral subarachnoid space, compatible with some subarachnoid hemorrhage, within the mid and posterior/dependent    brain, involving bilateral frontal, parietal, occipital and temporal regions. 2. Susceptibility artifact along the lateral aspect of the left lateral ventricle body, most compatible with subependymal parenchymal hemorrhage, similar to prior CT. 3. Diffusion restriction compatible with recent small ischemic infarct along the medial right frontal cortex. 4. Moderate to severe diffuse cerebral atrophy with superimposed moderate to severe small vessel ischemic disease. MRI THORACIC SPINE W WO CONTRAST   Final Result      Unremarkable thoracic spine MRI. MRI LUMBAR SPINE W WO CONTRAST   Final Result      Multilevel discogenic/spondylotic changes with multilevel canal and foraminal narrowing as above. Abdominal aortic aneurysm. No abnormal enhancement following contrast administration. CT CERVICAL SPINE WO CONTRAST   Final Result      No acute fracture identified. CT LUMBAR SPINE WO CONTRAST   Final Result      Spondylotic changes as above. No acute fracture seen. Abdominal aortic aneurysm measuring up to 3.2 cm. CT THORACIC SPINE WO CONTRAST   Final Result      Thoracic scoliosis. No acute fracture seen. XR CHEST PORTABLE   Final Result      Mild left basilar atelectasis or infiltrate. CT HEAD WO CONTRAST   Final Result      Persistent intraventricular hemorrhage with small amount of suspected parenchymal bleed along the lateral aspect of the posterior horn of the left lateral ventricle. The extent of the hemorrhage has decreased in comparison the prior study but interval    rebleed is a potential consideration. Results called to Dr. Chris Zapien at 7:15 PM on 8/17/2022.                             Consults:     IP CONSULT TO HOSPITALIST  IP CONSULT TO NEUROSURGERY  IP CONSULT TO NEUROLOGY  IP CONSULT TO CARDIOLOGY    Disposition: Skilled nursing facility    Condition at Discharge: Stable    Discharge Instructions/Follow-up: Skilled nursing facility    Code Status:  Full Code     Activity: activity as tolerated    Diet: regular diet      Discharge Medications:     Current Discharge Medication List             Details   Probiotic Product (PROBIOTIC ADVANCED PO) Take 1 caplet by mouth daily      furosemide (LASIX) 40 MG tablet Take 40 mg by mouth in the morning. dilTIAZem (CARDIZEM) 30 MG tablet Take 30 mg by mouth in the morning. PRN HR over 100 for more than 10 min.      potassium chloride (MICRO-K) 10 MEQ extended release capsule Take 10 mEq by mouth in the morning. cefUROXime (CEFTIN) 500 MG tablet Take 1 tablet by mouth in the morning and 1 tablet before bedtime. Do all this for 10 days. Qty: 20 tablet, Refills: 0      carvedilol (COREG) 3.125 MG tablet Take 3.125 mg by mouth in the morning and 3.125 mg in the evening. Take with meals. Cholecalciferol (VITAMIN D3) 50 MCG (2000 UT) CAPS Take by mouth 2 times daily      valsartan (DIOVAN) 40 MG tablet Take 40 mg by mouth in the morning. fluticasone-umeclidin-vilant (TRELEGY ELLIPTA) 100-62.5-25 MCG/INH AEPB Inhale 1 puff into the lungs daily      levothyroxine (SYNTHROID) 75 MCG tablet Take 75 mcg by mouth Daily      JANUVIA 50 MG tablet Take 50 mg by mouth daily       COMBIGAN 0.2-0.5 % ophthalmic solution Place 1 drop into both eyes every 12 hours       latanoprost (XALATAN) 0.005 % ophthalmic solution Place 1 drop into both eyes daily       Multiple Vitamin (MULTIVITAMIN) tablet Take 1 tablet by mouth daily             Time Spent on discharge is more than 45 minutes in the examination, evaluation, counseling and review of medications and discharge plan. Signed:    Mustapha Johns MD   8/23/2022      Thank you Cindy Pearson MD for the opportunity to be involved in this patient's care.  If you have any questions or concerns, please feel free to contact me at [phone].

## 2022-08-23 NOTE — PLAN OF CARE
Problem: Pain  Goal: Verbalizes/displays adequate comfort level or baseline comfort level  Outcome: Progressing  Flowsheets (Taken 8/23/2022 0217)  Verbalizes/displays adequate comfort level or baseline comfort level:   Encourage patient to monitor pain and request assistance   Administer analgesics based on type and severity of pain and evaluate response   Assess pain using appropriate pain scale   Implement non-pharmacological measures as appropriate and evaluate response   Notify Licensed Independent Practitioner if interventions unsuccessful or patient reports new pain  Note: Pt reported a headache a couple times during shift. Pt was given PRN tylenol and a heat pack was applied. Will continue to monitor pt's pain level. Problem: Safety - Adult  Goal: Free from fall injury  Outcome: Progressing  Flowsheets (Taken 8/22/2022 2014)  Free From Fall Injury: Instruct family/caregiver on patient safety  Note: Pt will remain free from accidental injury this shift. Pt has fall risk measures (fall risk bracelet, fall risk sign, fall risk cloth, non-slip socks, dome light on) in place. Pt bed is in low position, bed alarm on, bed wheels locked, call light within reach, bedside table within reach, chair wheels locked, chair alarm on. Problem: ABCDS Injury Assessment  Goal: Absence of physical injury  Outcome: Progressing  Flowsheets (Taken 8/22/2022 2014)  Absence of Physical Injury: Implement safety measures based on patient assessment     Problem: Skin/Tissue Integrity  Goal: Absence of new skin breakdown  Description: 1. Monitor for areas of redness and/or skin breakdown  2. Assess vascular access sites hourly  3. Every 4-6 hours minimum:  Change oxygen saturation probe site  4. Every 4-6 hours:  If on nasal continuous positive airway pressure, respiratory therapy assess nares and determine need for appliance change or resting period. Outcome: Progressing  Note: Pt shows no new skin breakdown. Problem: Neurosensory - Adult  Goal: Achieves stable or improved neurological status  Outcome: Progressing  Flowsheets (Taken 8/23/2022 0217)  Achieves stable or improved neurological status:   Assess for and report changes in neurological status   Initiate measures to prevent increased intracranial pressure   Maintain blood pressure and fluid volume within ordered parameters to optimize cerebral perfusion and minimize risk of hemorrhage   Monitor temperature, glucose, and sodium. Initiate appropriate interventions as ordered  Note: Pt is alert and oriented x4 and follows commands appropriately. Neuro checks are being performed q4h. Problem: Respiratory - Adult  Goal: Achieves optimal ventilation and oxygenation  Outcome: Progressing  Flowsheets (Taken 8/23/2022 0217)  Achieves optimal ventilation and oxygenation:   Assess for changes in respiratory status   Position to facilitate oxygenation and minimize respiratory effort   Respiratory therapy support as indicated   Oxygen supplementation based on oxygen saturation or arterial blood gases   Encourage broncho-pulmonary hygiene including cough, deep breathe, incentive spirometry   Assess and instruct to report shortness of breath or any respiratory difficulty  Note: Pt is currently on 2L NC and SpO2 has been WNL. Pt is on 2L NC at baseline. Problem: Cardiovascular - Adult  Goal: Maintains optimal cardiac output and hemodynamic stability  Outcome: Progressing  Flowsheets (Taken 8/23/2022 0217)  Maintains optimal cardiac output and hemodynamic stability:   Monitor blood pressure and heart rate   Monitor urine output and notify Licensed Independent Practitioner for values outside of normal range   Assess for signs of decreased cardiac output  Note: Pt is on continuous telemetry and heart rhythm is NSR.      Problem: Metabolic/Fluid and Electrolytes - Adult  Goal: Glucose maintained within prescribed range  Outcome: Progressing  Flowsheets (Taken 8/23/2022

## 2022-08-23 NOTE — PROGRESS NOTES
Physical Therapy    Daily Treatment Note          Discharge Recommendations:  Hallie Sierra scored a 17/24 on the AM-PAC short mobility form. Current research shows that an AM-PAC score of 17 or less is typically not associated with a discharge to the patient's home setting. Based on the patient's AM-PAC score and their current functional mobility deficits, it is recommended that the patient have 3-5 sessions per week of Physical Therapy at d/c to increase the patient's independence. Please see assessment section for further patient specific details. Assessment:  Pt needing increased assist for balance and ambulation. Fatigues easily in standing activity. Gait is unsteady and pt is at risk for falls. Pt would benefit from ongoing skilled PT to increase strength to maximize functional independence. IF pt returns home, pt will need 24hr capable assist and home PT to ensure safety. Equipment Needs:  Defer    Chart Reviewed: Yes     Other position/activity restrictions: Up with assist   Additional Pertinent Hx: Pt to ED 8/17 with ICH and falls. Pt [facility]15 with [address] and continued decline at home, falling 4 times. Head Imgaing: shows hemorrhage has decreased. Spine Imaging: Multilevel discogenic/spondylotic changes. Neurosurgery consult. PMH:  Afib, CHF, HTN, Covid-19, falls, ICH      Diagnosis: ICH   Treatment Diagnosis: Decreased gait associated with ICH. Subjective:  Pt found supine in bed with family in room. Pt reports a headache, but agreeable for PT. \"I need to go to the bathroom. \"      Pain:  5/10 headache pain, RN is aware and pt had meds prior to PT. Objective:    Bed mobility  Supine to sit:  Min A (HOB raised, using rail, verbal cues, increased assist for upper trunk)  Sit to Supine:  CGA (bed flat, increased verbal cues, increased effort)    Transfers  Sit to stand:  CGA (from EOB 3x total to walker; from toilet 4x total to walker.   Pt needing increased verbal cues for safety and hand placement)  Stand to sit:  CGA (cues for safety and hand placement)    Ambulation  Assistance Level:  Min A   Assistive device:  Rolling walker  Distance:  20ft + 20ft + 40ft   Quality of gait:  slow gait, deviated path, 2 episodes LOB with Min A to correct, unsteady gait  Other:  2L O2    Neuro/balance  Sitting balance:  WFL  Static stance:  CGA with UE support  Dynamic stance:  Min A with UE support    Patient Education  Role of PT. Pt needs ongoing education due to cognition. Safety Devices  Pt left with needs in reach, seated up in bed with alarm in place and RN aware. Family in room. AM-PAC score  AM-PAC Inpatient Mobility Raw Score : 17  AM-PAC Inpatient T-Scale Score : 42.13  Mobility Inpatient CMS 0-100% Score: 50.57  Mobility Inpatient CMS G-Code Modifier : CK    Goals:  Goals not met this treatment, continue with current goals. Time Frame for Short term goals: Discharge  Short term goal 1: supine <> sit SBA- goal met UPDATED: Pt will complete bed mobility Ind   Short term goal 2: sit <> stand SBA- ongoing   Short term goal 3: ambulate 150ft with rolling walker SBA- ongoing          Plan:  2-5x/week  Current Treatment Recommendations: Transfer training, Functional mobility training, Strengthening, Gait training    Therapy Time    Individual  Concurrent  Group  Co-treatment    Time In  1130            Time Out  1208            Minutes  38            Timed Code Treatment Minutes:  38  Total Treatment Minutes:  38      If patient is discharged prior to next treatment, this note will serve as the discharge summary.     Sharifa Donnelly, PT

## 2022-08-23 NOTE — PROGRESS NOTES
Attempted to call report.  Left secure voicemail with call back number    Electronically signed by Zeny Waters RN on 8/23/2022 at 1:27 PM

## 2022-08-23 NOTE — CARE COORDINATION
Case Management Assessment            Discharge Note                    Date / Time of Note: 8/23/2022 9:47 AM                  Discharge Note Completed by: Anastasia Lopez RN    Patient Name: Crow Roblero   YOB: 1935  Diagnosis: Intracranial hemorrhage ([facility] [address] [I62.9]  Falls frequently [R29.6]  Abdominal aortic aneurysm (AAA) without rupture [facility]) [I71.4]  Intracranial hemangioma ([facility] [address] [D18.02]   Date / Time: 8/17/2022  4:16 PM    Current PCP: Sonya Dietrich MD  Clinic patient: No    Hospitalization in the last 30 days: Yes    Advance Directives:  Code Status: Full Code  [place] DNR form completed and on chart: Not Indicated    Financial:  Payor: Anh Block / Plan: Sotero Angelucci HMO / Product Type: Medicare /      Pharmacy:    [facility] [id number] [place] [address]  Phone: [phone] Fax: [phone]      Assistance purchasing medications?: Potential Assistance Purchasing Medications: No  Assistance provided by Case Management: None at this time    Does patient want to participate in local refill/ meds to beds program?: No    Meds To Beds General Rules:  1. Can ONLY be done Monday- Friday between 8:30am-5pm  2. Prescription(s) must be in pharmacy by 3pm to be filled same day  3. Copy of patient's insurance/ prescription drug card and patient face sheet must be sent along with the prescription(s)  4. Cost of Rx cannot be added to hospital bill. If financial assistance is needed, please contact unit  or ;  or  CANNOT provide pharmacy voucher for patients co-pays  5.  Patients can then  the prescription on their way out of the hospital at discharge, or pharmacy can deliver to the bedside if staff is available. (payment due at time of pick-up or delivery - cash, check, or card accepted)     Able to afford home medications/ co-pay costs: Yes    ADLS:  Current PT AM-PAC Score: 17 /24  Current OT AM-PAC Score: 17 /24      DISCHARGE Disposition: [facility]  [phone]    LOC at discharge: Skilled  MICAH Completed: Yes    Notification completed in HENS/PAS?:  Document ID : [id number]    IMM Completed:   Yes, Case management has presented and reviewed IMM letter #2 to the patient and/or family/ POA. Patient and/or family/POA verbalized understanding of their medicare rights and appeal process if needed. Patient and/or family/POA has signed, initialed and placed today's date (8/23/22) and time (1300) on IMM letter #2 on the the appropriate lines. Patient and/or family/POA, copy of letter offered and they are aware that this original copy of IMM letter #2 is available prior to discharge from the paper chart on the unit. Electronic documentation has been entered into epic for IMM letter #2 and original paper copy has been added to the paper chart at the nurses station. Transportation:  Transportation PLAN for discharge: EMS transportation   Mode of Transport: Ambulance stretcher - S  Reason for medical transport: Lethargic due to intracranial hemmohage  and requires ambulance transport due to deconditioned  Name of [address]: [address]  Phone: [phone]  Time of Transport: 13:30pm    Transport form completed: Yes    Additional CM Notes: Pt to discharge to [facility] today at 13:30pm via [address] [place] [address], pending Rapid COVID results.      The Plan for Transition of Care is related to the following treatment goals of Intracranial hemorrhage ([address] [I62.9]  Falls frequently [R29.6]  Abdominal aortic aneurysm (AAA) without rupture ([address] [I71.4]  Intracranial hemangioma ([address] [D18.02]    The Patient and/or patient representative Mariella Molina and his family were provided with a choice of provider and agrees with the discharge plan Yes    Freedom of choice list was provided with basic dialogue that supports the patient's individualized plan of care/goals and shares the quality data associated with the providers.  Yes    Care Transitions patient: No    Timmy Plata, VIET  [facility]  Case Management Department  Ph: [phone]  Fax: [phone]

## 2022-08-26 LAB
CSF CULTURE: NORMAL
GRAM STAIN RESULT: NORMAL

## 2022-08-26 NOTE — PROGRESS NOTES
Physician Progress Note      Shashank Perkins  [facility] #:                  [id number]  :                       1935  ADMIT DATE:       2022 4:16 PM  [address] [place] DATE:        2022 1:50 PM  RESPONDING  PROVIDER #:        Adrian Salmeron MD          QUERY TEXT:    Patient admitted with falls . Noted documentation of ischemic stroke in neuro    PN . Please document if you are evaluating/treating the following. The medical record reflects the following:  Risk Factors: 83 y/o male Pt was discharged from [facility] Monday after an admit for   ICH s/p fall at home  Clinical Indicators:  Neurology PN: \"Brendan Calhoun is a 82 y.o. male who   presented with frequent falls after recent intraventricular hemorrhage. Small   area of right frontal ischemic stroke on MRI. \"  Treatment: Neuro consult, low intensity statin, Telemetry while inpatient,   Lipid panel and Hgb A1c  Options provided:  -- Ischemic stroke present as evidenced by  -- Ischemic stroke was ruled out  -- Other - I will add my own diagnosis  -- Disagree - Not applicable / Not valid  -- Disagree - Clinically unable to determine / Unknown  -- Refer to Clinical Documentation Reviewer    PROVIDER RESPONSE TEXT:    Ischemic stroke was ruled out after study.     Query created by: Miryam Vyas on 2022 1:56 PM      Electronically signed by:  Adrian Salmeron MD 2022 1:58 PM

## 2022-09-02 NOTE — PROGRESS NOTES
Physician Progress Note      Jose Juan Winn  [facility] #:                  [id number]  :                       1935  ADMIT DATE:       2022 4:16 PM  [address] [place] DATE:        2022 1:50 PM  RESPONDING  PROVIDER #:        Mattie Gallagher MD          QUERY TEXT:    Pt admitted with Weakness. Pt noted to have [address] unchanged on imaging from   previous. If possible, please document in progress notes and discharge summary   the relationship, if any, between the following: The medical record reflects the following:  Risk Factors: 83 y/o with recent ICH, afib, and Bradycardia  Clinical Indicators: H&P: \". Pt was discharged from [facility] Monday after an admit   for ICH s/p fall at home. Was admitted, seen by neurosurgery, no surgical   intervention needed. Repeat head CT stable, eliquis stopped. Pt dc'd home with   f/u with neurosurgery: Weakness/ falls: not clear if this is due to afib/ HR   issues. \"  Per cardiology consult: \"Paroxysmal atrial fibrillation. Bradycardia reported: I recommend that the patient undergo further cardiac   evaluation with  echo. I recommend that the patient be treated with no rate   slowing medications. Pt appears to be in SR with no e  Treatment: Neurosurgery consult, Imaging, Cardiology consult, no AC, monitor   on tele, No rate slowing medications  Options provided:  -- Weakness and falls due to sequelae of ICH  -- Weakness and falls due to A-fib  -- Weakness due to Bradycardia  -- Other - I will add my own diagnosis  -- Disagree - Not applicable / Not valid  -- Disagree - Clinically unable to determine / Unknown  -- Refer to Clinical Documentation Reviewer    PROVIDER RESPONSE TEXT:    This patient has weakness and falls due to Bradycardia.     Query created by: Laurie Gomez on 2022 9:38 AM      Electronically signed by:  Mattie Gallagher MD 2022 8:27 AM SUBJECTIVE:  The patient Deep Sanabria Jr is a 89 year old male.  The patient is hear for evaluation of COPD cough congestion.    Patient is an 89-year-old male quit smoking 16 years ago he used to be a     In post office also sometime in February developed cough cold congestion seen by primary does not know if antibiotic given inhalers given still coughing congested wheezing short of breath chest x-ray done chest x-ray showing bronchial cuffing which can be seen with reactive airway or viral infection.    Patient is still having problems in the office advised to see us is dyspnea on exertion has cough wheeze no hemoptysis no night sweats no recent weight loss  Review of Systems   Constitutional: Negative for activity change, appetite change, chills, diaphoresis, fatigue, fever and unexpected weight change.   HENT: Negative for congestion, mouth sores, nosebleeds, postnasal drip, rhinorrhea, sinus pressure, sinus pain, sneezing, sore throat, trouble swallowing and voice change.    Respiratory: Positive for cough, shortness of breath and wheezing. Negative for apnea, choking, chest tightness and stridor.    Cardiovascular: Negative for chest pain, palpitations and leg swelling.   Gastrointestinal: Negative for abdominal distention and abdominal pain.   Skin: Negative for pallor, rash and wound.   Allergic/Immunologic: Negative for environmental allergies, food allergies and immunocompromised state.       CURRENT MEDS:  Current Outpatient Medications   Medication Sig Dispense Refill   • aspirin 81 MG chewable tablet Chew 81 mg by mouth daily.     • Ferrous Sulfate (IRON) 325 (65 Fe) MG Tab Take 325 mg by mouth.     • insulin aspart (NOVOLOG MIX 70/30) 70-30 (100 UNIT/ML) injectable suspension Inject into the skin 2 times daily (before meals).     • MYRBETRIQ 25 MG 24 hour tablet Take 25 mg by mouth daily.     • furosemide (LASIX) 40 MG tablet 1.5 TAB DAILY     • ramipril (ALTACE) 5 MG capsule Take 5 mg by mouth  daily.     • atorvastatin (LIPITOR) 40 MG tablet Take 40 mg by mouth daily.     • levothyroxine 200 MCG tablet TAKE 1 TABLET BY MOUTH EVERY DAY ON EMPTY STOMACH IN THE MORNING     • famotidine (PEPCID) 20 MG tablet 1 TAB DAILY     • tiZANidine (ZANAFLEX) 4 MG tablet      • INCRUSE ELLIPTA 62.5 MCG/INH inhaler 1 PUFF DAILY     • ipratropium-albuterol (DUONEB) 0.5-2.5 (3) MG/3ML nebulizer solution USE 1 VIAL (3ML) IN THE NEBULIZER THREE TIMES DAILY AS NEEDED 30 DAYS     • latanoprost (XALATAN) 0.005 % ophthalmic solution INSTILL 1 DROP INTO BOTH EYES AT BEDTIME     • dorzolamide (TRUSOPT) 2 % ophthalmic solution INSTILL 1 DROP INTO LEFT EYE TWICE A DAY     • erythromycin (ILOTYCIN) ophthalmic ointment APPLY TO BOTH LIDS AT BEDTIME     • fluticasone (FLONASE) 50 MCG/ACT nasal spray 1 SPRAY IN EACH NOSTRIL     • ofloxacin (OCUFLOX) 0.3 % ophthalmic solution INSTILL 1 DROP INTO LEFT EYE TWICE A DAY     • prednisoLONE acetate (PRED FORTE) 1 % ophthalmic suspension INSTILL 1 DROP INTO LEFT EYE TWICE A DAY     • fluticasone propionate (FLOVENT HFA) 110 MCG/ACT inhaler Inhale 1 puff into the lungs 2 times daily. 12 g 12   • doxycycline monohydrate (MONODOX) 100 MG capsule Take 1 capsule by mouth daily. 30 capsule 0     No current facility-administered medications for this visit.        HISTORIES:    No past medical history on file.   No past surgical history on file.   ALLERGIES:  No Known Allergies   Social History     Tobacco Use   • Smoking status: Former Smoker   • Smokeless tobacco: Never Used   Substance Use Topics   • Alcohol use: Not Currently      No family history on file.         OBJECTIVE:  There were no vitals taken for this visit.    Physical Exam this is video evaluation patient seen on the video does not appear to be having acute distress.    Asked to cough cough sounds congested and I hear wheezing    ASSESSMENT AND PLAN:  1. Chronic obstructive pulmonary disease, unspecified COPD type (CMS/HCC)    -  fluticasone propionate (FLOVENT HFA) 110 MCG/ACT inhaler; Inhale 1 puff into the lungs 2 times daily.  Dispense: 12 g; Refill: 12  - doxycycline monohydrate (MONODOX) 100 MG capsule; Take 1 capsule by mouth daily.  Dispense: 30 capsule; Refill: 0    Impression.    Possible underlying COPD.    Presumed inflammatory condition with bronchospasm.    Recommendations.    Patient's medications reviewed has home nebulizer advised to use the nebulizer couple times a day at least 3 times a day    Addition to nebulizer patient is on Incruse prescription for inhaled steroids Flovent 1 tablet sent to the pharmacy.    In view of chest x-ray finding patient will benefit from low-dose antibiotic coverage prescription for doxycycline to take 1 daily sent to his pharmacy.    Advised to see me in a month or have a video interview again in a month to make sure he is getting better  5/1/2020    Silvino Barbosa MD

## 2022-11-01 NOTE — DISCHARGE SUMMARY
Hospital Medicine Discharge Summary    Patient ID: Uzair Sara      Patient's PCP: Chun Rios MD    Admit Date: 8/13/2022     Discharge Date: 8/15/2022    Admitting Physician: Elvira Leary MD     Discharge Physician: Elvira Leary MD     Discharge Diagnoses: Active Hospital Problems    Diagnosis Date Noted    Brain bleed ([facility] [address] [I61.9] 08/13/2022     Priority: Medium    Acute intracranial hemorrhage ([facility] [address] [I62.9] 08/13/2022     Priority: Medium       The patient was seen and examined on day of discharge and this discharge summary is in conjunction with any daily progress note from day of discharge. Condition at discharge - stable    Hospital Course: patient seen and evaluated on the day of discharge. Patient informed about following up with appointments. Patient verbalized understanding for follow-up appointments. The patient and / or the family were informed of the results of tests, a time was given to answer questions, a plan was proposed and they agreed with plan. Medical reconciliation performed. Patient discharged stable condition. On the date of discharge, the patient reported feeling stable. The patient was found to not be in any acute distress, with vital signs within normal limits, and no new abnormalities on physical examination. Further, the patient expressed appropriate understanding of, and agreement with, the discharge recommendations, medications, and plan. Intracranial hemorrhage after fall on eliquis- repeat CT head, NS consulted - repeat CT stable, no AP/AC  Eliquis reversed with K centra     TAYLER - monitor     Hyperglycemia - DM - ISS, diabetic dit     HTN - stable     DVT Prophylaxis:  Diet: ADULT DIET;  Regular; 4 carb choices (60 gm/meal)  Code Status: Full Code     PT/OT Eval Status: ordered     Dispo/Plan of care -  ok to transfer to floor, PT/OT consult     Continue to hold eliquis     Patient to f/u with NS to determine the timing of resumption of eliquis, patient verbalized understanding and agreed with the treatment plan    Exam:     BP (!) 148/86 Comment: manual  Pulse 81   Temp 98.1 °F (36.7 °C) (Oral)   Resp 22   Ht 5' 4\" (1.626 m)   Wt 138 lb 3.7 oz (62.7 kg)   SpO2 100%   BMI 23.73 kg/m²     General appearance: No apparent distress  HEENT:  Conjunctivae/corneas clear. Neck: Supple, No jugular venous distention. Respiratory:  Normal respiratory effort. Clear to auscultation, bilaterally without Rales/Wheezes/Rhonchi. Cardiovascular: Regular rate and rhythm with normal S1/S2 without murmurs, rubs or gallops. Abdomen: Soft, non-tender, non-distended, normal bowel sounds. Musculoskelatal: No clubbing, cyanosis or edema bilaterally. Skin: Skin color, texture, turgor normal.   Neurologic: no focal neurologic deficits. grossly non-focal.  Psychiatric: Alert and oriented, normal mood    Consults:     IP CONSULT TO NEUROSURGERY  IP CONSULT TO PHARMACY  PHARMACY TO CHANGE BASE FLUIDS  IP CONSULT TO CRITICAL CARE  IP CONSULT TO PHARMACY  IP CONSULT TO HOME CARE NEEDS      Code Status:  Prior    Activity: activity as tolerated    Labs: For convenience and continuity at follow-up the following most recent labs are provided:      CBC:    Lab Results   Component Value Date/Time    WBC 8.6 08/21/2022 10:27 AM    HGB 10.2 08/21/2022 10:27 AM    HCT 31.6 08/21/2022 10:27 AM     08/21/2022 10:27 AM       Renal:    Lab Results   Component Value Date/Time     08/21/2022 10:27 AM    K 4.0 08/21/2022 10:27 AM     08/21/2022 10:27 AM    CO2 25 08/21/2022 10:27 AM    BUN 31 08/21/2022 10:27 AM    CREATININE 1.3 08/21/2022 10:27 AM    CALCIUM 8.7 08/21/2022 10:27 AM       Discharge Medications:     Discharge Medication List as of 8/15/2022  2:58 PM             Details   cefUROXime (CEFTIN) 500 MG tablet Take 1 tablet by mouth in the morning and 1 tablet before bedtime. Do all this for 10 days. , Disp-20 tablet, R-0Normal      metroNIDAZOLE (FLAGYL) 500 MG tablet Take 1 tablet by mouth in the morning and 1 tablet at noon and 1 tablet before bedtime. Do all this for 10 days. , Disp-30 tablet, R-0Normal                Details   Probiotic Product (PROBIOTIC ADVANCED PO) Take 1 caplet by mouth dailyHistorical Med      furosemide (LASIX) 40 MG tablet Take 40 mg by mouth in the morning. Historical Med      dilTIAZem (CARDIZEM) 30 MG tablet Take 30 mg by mouth in the morning. PRN HR over 100 for more than 10 min. Historical Med      potassium chloride (MICRO-K) 10 MEQ extended release capsule Take 10 mEq by mouth in the morning. Historical Med      carvedilol (COREG) 3.125 MG tablet Take 3.125 mg by mouth in the morning and 3.125 mg in the evening. Take with meals. Historical Med      Cholecalciferol (VITAMIN D3) 50 MCG (2000 UT) CAPS Take by mouth 2 times dailyHistorical Med      valsartan (DIOVAN) 40 MG tablet Take 40 mg by mouth in the morning. Historical Med      fluticasone-umeclidin-vilant (TRELEGY ELLIPTA) 100-62.5-25 MCG/INH AEPB Inhale 1 puff into the lungs dailyHistorical Med      levothyroxine (SYNTHROID) 75 MCG tablet Take 75 mcg by mouth DailyHistorical Med      JANUVIA 50 MG tablet Take 50 mg by mouth daily , DAWHistorical Med      COMBIGAN 0.2-0.5 % ophthalmic solution Place 1 drop into both eyes every 12 hours , DAWHistorical Med      latanoprost (XALATAN) 0.005 % ophthalmic solution Place 1 drop into both eyes daily Historical Med      Multiple Vitamin (MULTIVITAMIN) tablet Take 1 tablet by mouth dailyHistorical Med             Time Spent on discharge is more than 30 mints in the examination, evaluation, counseling and review of medications and discharge plan. Signed:    Janusz Perea MD   10/31/2022      Thank you Romulo Reynolds MD for the opportunity to be involved in this patient's care. If you have any questions or concerns please feel free to contact me at [phone].

## 2023-04-27 NOTE — Clinical Note
Dr. Lucie Cobb,    I saw Mr. 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 It is somewhat limiting but not unmanageable and he has no evidence of limb threatening ischemia. To that end we will begin with a conservative approach considering intervention should he not improve with exercise. He also has a prominent aortic pulsation and will undergo a aortic duplex scan to rule out abdominal aortic aneurysm. He is to return to see me in 6 weeks for these results and further discussions. If you have any questions please feel free to contact me. Thanks for asked me to see him and please let me know if I can help you with any of your other patients in the future.     Colby Butler
Intact

## 2024-03-22 NOTE — TELEPHONE ENCOUNTER
----- Message from Roddy Barrow MD sent at 1/17/2021  7:46 AM EST -----  Can stop it and see what happens. If diarrhea resolves stay off it. If no benefit then resume. Me  ----- Message -----  From: Dannie Mueller MA  Sent: 1/13/2021   3:42 PM EST  To: Roddy Barrow MD    Pletal he believes is causing him diarrhea.  And suggestions lanny Galindo for patient per Dr Almanzar January note of Pletal. lanny No